# Patient Record
Sex: FEMALE | Race: WHITE | NOT HISPANIC OR LATINO | Employment: FULL TIME | URBAN - METROPOLITAN AREA
[De-identification: names, ages, dates, MRNs, and addresses within clinical notes are randomized per-mention and may not be internally consistent; named-entity substitution may affect disease eponyms.]

---

## 2022-07-21 ASSESSMENT — ENCOUNTER SYMPTOMS
HEADACHES: 0
ARTHRALGIAS: 0
CONSTIPATION: 0
COUGH: 0
NAUSEA: 0
MYALGIAS: 0
HEMATURIA: 0
SORE THROAT: 0
DYSURIA: 0
NERVOUS/ANXIOUS: 1
ABDOMINAL PAIN: 0
HEMATOCHEZIA: 0
WEAKNESS: 0
CHILLS: 0
EYE PAIN: 0
JOINT SWELLING: 0
HEARTBURN: 0
FEVER: 0
FREQUENCY: 0
PALPITATIONS: 0
BREAST MASS: 0
DIZZINESS: 0
DIARRHEA: 0
PARESTHESIAS: 0
SHORTNESS OF BREATH: 0

## 2022-07-22 ENCOUNTER — OFFICE VISIT (OUTPATIENT)
Dept: FAMILY MEDICINE | Facility: CLINIC | Age: 22
End: 2022-07-22
Payer: COMMERCIAL

## 2022-07-22 VITALS
BODY MASS INDEX: 40.55 KG/M2 | DIASTOLIC BLOOD PRESSURE: 80 MMHG | HEIGHT: 64 IN | OXYGEN SATURATION: 98 % | TEMPERATURE: 98.1 F | SYSTOLIC BLOOD PRESSURE: 124 MMHG | RESPIRATION RATE: 18 BRPM | HEART RATE: 93 BPM | WEIGHT: 237.5 LBS

## 2022-07-22 DIAGNOSIS — Z00.00 ENCOUNTER FOR ROUTINE HISTORY AND PHYSICAL EXAM IN FEMALE: Primary | ICD-10-CM

## 2022-07-22 DIAGNOSIS — E66.01 MORBID OBESITY (H): ICD-10-CM

## 2022-07-22 DIAGNOSIS — Z13.220 LIPID SCREENING: ICD-10-CM

## 2022-07-22 DIAGNOSIS — G47.9 SLEEP DISORDER: ICD-10-CM

## 2022-07-22 DIAGNOSIS — Z79.899 MEDICATION MANAGEMENT: ICD-10-CM

## 2022-07-22 DIAGNOSIS — J30.9 ALLERGIC RHINITIS, UNSPECIFIED SEASONALITY, UNSPECIFIED TRIGGER: ICD-10-CM

## 2022-07-22 LAB
ANION GAP SERPL CALCULATED.3IONS-SCNC: 9 MMOL/L (ref 7–15)
BUN SERPL-MCNC: 10.2 MG/DL (ref 6–20)
CALCIUM SERPL-MCNC: 9.7 MG/DL (ref 8.6–10)
CHLORIDE SERPL-SCNC: 103 MMOL/L (ref 98–107)
CHOLEST SERPL-MCNC: 157 MG/DL
CREAT SERPL-MCNC: 0.61 MG/DL (ref 0.51–0.95)
DEPRECATED HCO3 PLAS-SCNC: 27 MMOL/L (ref 22–29)
GFR SERPL CREATININE-BSD FRML MDRD: >90 ML/MIN/1.73M2
GLUCOSE SERPL-MCNC: 78 MG/DL (ref 70–99)
HDLC SERPL-MCNC: 56 MG/DL
HGB BLD-MCNC: 13.5 G/DL (ref 11.7–15.7)
LDLC SERPL CALC-MCNC: 84 MG/DL
NONHDLC SERPL-MCNC: 101 MG/DL
POTASSIUM SERPL-SCNC: 3.9 MMOL/L (ref 3.4–5.3)
SODIUM SERPL-SCNC: 139 MMOL/L (ref 136–145)
TRIGL SERPL-MCNC: 86 MG/DL

## 2022-07-22 PROCEDURE — 99385 PREV VISIT NEW AGE 18-39: CPT | Performed by: NURSE PRACTITIONER

## 2022-07-22 PROCEDURE — 36415 COLL VENOUS BLD VENIPUNCTURE: CPT | Performed by: NURSE PRACTITIONER

## 2022-07-22 PROCEDURE — 99213 OFFICE O/P EST LOW 20 MIN: CPT | Mod: 25 | Performed by: NURSE PRACTITIONER

## 2022-07-22 PROCEDURE — 85018 HEMOGLOBIN: CPT | Performed by: NURSE PRACTITIONER

## 2022-07-22 PROCEDURE — 80061 LIPID PANEL: CPT | Performed by: NURSE PRACTITIONER

## 2022-07-22 PROCEDURE — 80048 BASIC METABOLIC PNL TOTAL CA: CPT | Performed by: NURSE PRACTITIONER

## 2022-07-22 RX ORDER — VENLAFAXINE 37.5 MG/1
TABLET ORAL
COMMUNITY
Start: 2018-08-21 | End: 2022-07-22

## 2022-07-22 RX ORDER — VENLAFAXINE 37.5 MG/1
37.5 TABLET ORAL 2 TIMES DAILY
Qty: 180 TABLET | Refills: 3 | Status: SHIPPED | OUTPATIENT
Start: 2022-07-22 | End: 2024-02-21

## 2022-07-22 RX ORDER — LORATADINE 10 MG/1
TABLET ORAL
COMMUNITY
Start: 2022-04-01 | End: 2024-05-22

## 2022-07-22 RX ORDER — VENLAFAXINE 37.5 MG/1
37.5 TABLET ORAL 2 TIMES DAILY
COMMUNITY
Start: 2022-04-06 | End: 2022-07-22

## 2022-07-22 ASSESSMENT — ANXIETY QUESTIONNAIRES
2. NOT BEING ABLE TO STOP OR CONTROL WORRYING: SEVERAL DAYS
7. FEELING AFRAID AS IF SOMETHING AWFUL MIGHT HAPPEN: NOT AT ALL
IF YOU CHECKED OFF ANY PROBLEMS ON THIS QUESTIONNAIRE, HOW DIFFICULT HAVE THESE PROBLEMS MADE IT FOR YOU TO DO YOUR WORK, TAKE CARE OF THINGS AT HOME, OR GET ALONG WITH OTHER PEOPLE: NOT DIFFICULT AT ALL
1. FEELING NERVOUS, ANXIOUS, OR ON EDGE: SEVERAL DAYS
3. WORRYING TOO MUCH ABOUT DIFFERENT THINGS: SEVERAL DAYS
GAD7 TOTAL SCORE: 4
GAD7 TOTAL SCORE: 4
5. BEING SO RESTLESS THAT IT IS HARD TO SIT STILL: NOT AT ALL
6. BECOMING EASILY ANNOYED OR IRRITABLE: NOT AT ALL

## 2022-07-22 ASSESSMENT — PATIENT HEALTH QUESTIONNAIRE - PHQ9
5. POOR APPETITE OR OVEREATING: SEVERAL DAYS
SUM OF ALL RESPONSES TO PHQ QUESTIONS 1-9: 2

## 2022-07-22 ASSESSMENT — PAIN SCALES - GENERAL: PAINLEVEL: NO PAIN (0)

## 2022-07-22 NOTE — PROGRESS NOTES
Assessment and Plan:    Encounter for routine history and physical exam in female  Recommend consuming a healthy diet and exercising.  She declines HIV, hepatitis C screening, chlamydia, gonorrhea screening.  She believes she is up-to-date on vaccinations.  We will try to obtain records.  - Hemoglobin    Lipid screening  - Lipid panel reflex to direct LDL Fasting    Sleep disorder  Patient has suspected narcolepsy.  She is taking venlafaxine.  Will refer to sleep center.  - Adult Sleep Eval & Management  Referral  - venlafaxine (EFFEXOR) 37.5 MG tablet  Dispense: 180 tablet; Refill: 3    Morbid obesity (H)  Recommend consuming a healthy diet and exercising.  This may be contributing to his sleep disorder.    Allergic rhinitis, unspecified seasonality, unspecified trigger  She continues loratadine.  Allergy symptoms are controlled.    Medication management  - Basic metabolic panel  (Ca, Cl, CO2, Creat, Gluc, K, Na, BUN)      Subjective:     Indigo is a 22 year old female presenting to the clinic for a female physical.     LMP: one month ago, regular once/month   Hx of abnormal pap smear: none   Last pap smear: last year at [facility]   Perform self-breast exams: none   Vaginal discharge or irritation: none   Sexually active: yes,  to a male for 2 1/2 years  Contraception: none   Concerns for STDs: none   Previous pregnancies:none     Patient previously lived in [place].  At that time, she developed sleep paralysis and auditory hallucinations.  She saw neurology were multiple EEGs and sleep studies were performed.  Her provider suspected she had narcolepsy.  Patient is taking venlafaxine 37.5 mg twice daily.  She is interested in seeing a new sleep specialist.    Patient takes loratadine for allergy symptoms which are well controlled.    Review of systems:  I performed a 10 point review of systems.  All pertinent positives and negatives are noted in the HPI. All others are negative.     Allergies  "  Allergen Reactions     Seasonal Allergies Headache and Other (See Comments)       Current Outpatient Medications   Medication     loratadine (CLARITIN) 10 MG tablet     venlafaxine (EFFEXOR) 37.5 MG tablet     venlafaxine (EFFEXOR) 37.5 MG tablet     No current facility-administered medications for this visit.       Social History     Socioeconomic History     Marital status:      Spouse name: Not on file     Number of children: Not on file     Years of education: Not on file     Highest education level: Not on file   Occupational History     Not on file   Tobacco Use     Smoking status: Never Smoker     Smokeless tobacco: Never Used   Substance and Sexual Activity     Alcohol use: Not on file     Drug use: Not on file     Sexual activity: Not on file   Other Topics Concern     Not on file   Social History Narrative     Not on file     Social Determinants of Health     Financial Resource Strain: Not on file   Food Insecurity: Not on file   Transportation Needs: Not on file   Physical Activity: Not on file   Stress: Not on file   Social Connections: Not on file   Intimate Partner Violence: Not on file   Housing Stability: Not on file       No past medical history on file.    No family history on file.    No past surgical history on file.    Objective:     /80 (BP Location: Right arm, Patient Position: Sitting, Cuff Size: Adult Large)   Pulse 93   Temp 98.1  F (36.7  C)   Resp 18   Ht 1.613 m (5' 3.5\")   Wt 107.7 kg (237 lb 8 oz)   LMP 06/25/2022   SpO2 98%   BMI 41.41 kg/m      Patient is alert, no obvious distress.   Skin: Warm, dry.  No rashes or lesions. Skin turgor rapid return.   HEENT:  Eyes normal.  Ears normal.  Nose patent, mucosa pink.  Oropharynx mucosa pink, no lesions or tonsil enlargement.   Neck:  Supple, without lymphadenopathy, bruits, JVD. Thyroid normal texture and size.    Lungs:  Clear to auscultation.  No wheezing, rales noted.  Respirations even and unlabored.   Heart:  " Regular rate and rhythm.  No murmurs.   Breasts:  Normal.  No surrounding adenopathy.   Abdomen: Soft, nontender.  No organomegaly.  Bowel sounds normoactive.  No guarding or masses noted.   :  deferred  Musculoskeletal:  Full ROM of extremities.  Muscle strength equal +5/5.   Neurological:  Cranial nerves 2-12 intact.             Answers for HPI/ROS submitted by the patient on 7/21/2022  Frequency of exercise:: 2-3 days/week  Getting at least 3 servings of Calcium per day:: Yes  Diet:: Regular (no restrictions)  Taking medications regularly:: Yes  Medication side effects:: None  Bi-annual eye exam:: Yes  Dental care twice a year:: NO  Sleep apnea or symptoms of sleep apnea:: None  abdominal pain: No  Blood in stool: No  Blood in urine: No  chest pain: No  chills: No  congestion: Yes  constipation: No  cough: No  diarrhea: No  dizziness: No  ear pain: No  eye pain: No  nervous/anxious: Yes  fever: No  frequency: No  genital sores: No  headaches: No  hearing loss: No  heartburn: No  arthralgias: No  joint swelling: No  peripheral edema: No  mood changes: No  myalgias: No  nausea: No  dysuria: No  palpitations: No  Skin sensation changes: No  sore throat: No  urgency: No  rash: No  shortness of breath: No  visual disturbance: No  weakness: No  pelvic pain: No  vaginal bleeding: No  vaginal discharge: No  tenderness: No  breast mass: No  breast discharge: No  Additional concerns today:: No  Duration of exercise:: 15-30 minutes

## 2022-07-27 ENCOUNTER — TELEPHONE (OUTPATIENT)
Dept: FAMILY MEDICINE | Facility: CLINIC | Age: 22
End: 2022-07-27

## 2022-10-03 ENCOUNTER — HEALTH MAINTENANCE LETTER (OUTPATIENT)
Age: 22
End: 2022-10-03

## 2023-08-22 LAB
ABO/RH(D): NORMAL
ANTIBODY SCREEN: NEGATIVE
SPECIMEN EXPIRATION DATE: NORMAL
SPECIMEN EXPIRATION DATE: NORMAL

## 2023-08-23 ENCOUNTER — PRENATAL OFFICE VISIT (OUTPATIENT)
Dept: MIDWIFE SERVICES | Facility: CLINIC | Age: 23
End: 2023-08-23
Payer: COMMERCIAL

## 2023-08-23 VITALS
HEART RATE: 82 BPM | SYSTOLIC BLOOD PRESSURE: 128 MMHG | DIASTOLIC BLOOD PRESSURE: 78 MMHG | HEIGHT: 63 IN | WEIGHT: 240 LBS | BODY MASS INDEX: 42.52 KG/M2

## 2023-08-23 DIAGNOSIS — E66.01 MORBID OBESITY (H): ICD-10-CM

## 2023-08-23 DIAGNOSIS — O09.91 SUPERVISION OF HIGH RISK PREGNANCY, UNSPECIFIED, FIRST TRIMESTER: Primary | ICD-10-CM

## 2023-08-23 PROBLEM — G47.419 PRIMARY NARCOLEPSY WITHOUT CATAPLEXY: Status: ACTIVE | Noted: 2021-04-21

## 2023-08-23 LAB
BASOPHILS # BLD AUTO: 0.1 10E3/UL (ref 0–0.2)
BASOPHILS NFR BLD AUTO: 1 %
EOSINOPHIL # BLD AUTO: 0.5 10E3/UL (ref 0–0.7)
EOSINOPHIL NFR BLD AUTO: 4 %
ERYTHROCYTE [DISTWIDTH] IN BLOOD BY AUTOMATED COUNT: 12.8 % (ref 10–15)
HBA1C MFR BLD: 5.1 %
HCT VFR BLD AUTO: 42.6 % (ref 35–47)
HGB BLD-MCNC: 13.9 G/DL (ref 11.7–15.7)
IMM GRANULOCYTES # BLD: 0 10E3/UL
IMM GRANULOCYTES NFR BLD: 0 %
LYMPHOCYTES # BLD AUTO: 2.7 10E3/UL (ref 0.8–5.3)
LYMPHOCYTES NFR BLD AUTO: 23 %
MCH RBC QN AUTO: 28.2 PG (ref 26.5–33)
MCHC RBC AUTO-ENTMCNC: 32.6 G/DL (ref 31.5–36.5)
MCV RBC AUTO: 86 FL (ref 78–100)
MONOCYTES # BLD AUTO: 1 10E3/UL (ref 0–1.3)
MONOCYTES NFR BLD AUTO: 9 %
NEUTROPHILS # BLD AUTO: 7.5 10E3/UL (ref 1.6–8.3)
NEUTROPHILS NFR BLD AUTO: 63 %
NRBC # BLD AUTO: 0 10E3/UL
NRBC BLD AUTO-RTO: 0 /100
PLATELET # BLD AUTO: 294 10E3/UL (ref 150–450)
RBC # BLD AUTO: 4.93 10E6/UL (ref 3.8–5.2)
T PALLIDUM AB SER QL: NONREACTIVE
TSH SERPL DL<=0.005 MIU/L-ACNC: 2.75 UIU/ML (ref 0.3–4.2)
WBC # BLD AUTO: 11.9 10E3/UL (ref 4–11)

## 2023-08-23 PROCEDURE — 85025 COMPLETE CBC W/AUTO DIFF WBC: CPT | Performed by: MIDWIFE

## 2023-08-23 PROCEDURE — 87389 HIV-1 AG W/HIV-1&-2 AB AG IA: CPT | Performed by: MIDWIFE

## 2023-08-23 PROCEDURE — 86900 BLOOD TYPING SEROLOGIC ABO: CPT | Performed by: MIDWIFE

## 2023-08-23 PROCEDURE — 36415 COLL VENOUS BLD VENIPUNCTURE: CPT | Performed by: MIDWIFE

## 2023-08-23 PROCEDURE — 86803 HEPATITIS C AB TEST: CPT | Performed by: MIDWIFE

## 2023-08-23 PROCEDURE — 86780 TREPONEMA PALLIDUM: CPT | Performed by: MIDWIFE

## 2023-08-23 PROCEDURE — 87086 URINE CULTURE/COLONY COUNT: CPT | Performed by: MIDWIFE

## 2023-08-23 PROCEDURE — 86850 RBC ANTIBODY SCREEN: CPT | Performed by: MIDWIFE

## 2023-08-23 PROCEDURE — 84443 ASSAY THYROID STIM HORMONE: CPT | Performed by: MIDWIFE

## 2023-08-23 PROCEDURE — 86762 RUBELLA ANTIBODY: CPT | Performed by: MIDWIFE

## 2023-08-23 PROCEDURE — 87340 HEPATITIS B SURFACE AG IA: CPT | Performed by: MIDWIFE

## 2023-08-23 PROCEDURE — 86901 BLOOD TYPING SEROLOGIC RH(D): CPT | Performed by: MIDWIFE

## 2023-08-23 PROCEDURE — 99204 OFFICE O/P NEW MOD 45 MIN: CPT | Performed by: MIDWIFE

## 2023-08-23 PROCEDURE — 83036 HEMOGLOBIN GLYCOSYLATED A1C: CPT | Performed by: MIDWIFE

## 2023-08-23 ASSESSMENT — EDINBURGH POSTNATAL DEPRESSION SCALE (EPDS)
I HAVE FELT SCARED OR PANICKY FOR NO GOOD REASON: NO, NOT MUCH
I HAVE LOOKED FORWARD WITH ENJOYMENT TO THINGS: RATHER LESS THAN I USED TO
TOTAL SCORE: 10
I HAVE BLAMED MYSELF UNNECESSARILY WHEN THINGS WENT WRONG: NOT VERY OFTEN
I HAVE FELT SAD OR MISERABLE: NOT VERY OFTEN
I HAVE BEEN ANXIOUS OR WORRIED FOR NO GOOD REASON: YES, SOMETIMES
I HAVE BEEN ABLE TO LAUGH AND SEE THE FUNNY SIDE OF THINGS: NOT QUITE SO MUCH NOW
I HAVE BEEN SO UNHAPPY THAT I HAVE BEEN CRYING: ONLY OCCASIONALLY
THINGS HAVE BEEN GETTING ON TOP OF ME: NO, MOST OF THE TIME I HAVE COPED QUITE WELL
I HAVE BEEN SO UNHAPPY THAT I HAVE HAD DIFFICULTY SLEEPING: NOT VERY OFTEN
THE THOUGHT OF HARMING MYSELF HAS OCCURRED TO ME: NEVER

## 2023-08-23 NOTE — PATIENT INSTRUCTIONS
"recommendations for care for those with BMI ?40:  An early 1st trimester ultrasound  Nutrition consult with our clinic staff  A Level II US at mid pregnancy  Ultrasounds to monitor the baby's growth at the end of pregnancy, every 4-6 weeks  Monitoring during pregnancy including biophysical profile ultrasound and nonstress test heart rate monitoring weekly beginning at 36 weeks  An earlier delivery in the 39th week of pregnancy   Internal monitors during labor and delivery to better assess contractions and fetal heart rate if needed  A consultation with our anesthesia team if choosing an epidural  Uterotonics (medication to decrease risk of severe hemorrhage) at the time of delivery  Preventative medication for blood clots after birth  Screening for diabetes after pregnancy  \"We hope you had a positive experience and that you can definitely recommend [facility] to your family and friends. You ll be receiving a survey soon and we look forward to hearing your feedback\".    Welcome to [facility]   and thank you for choosing us for your maternity care provider!  Congratulations!      [employer]  After each of your visits you are welcome to check [employer] for your visit summary including education and links to information relevant to your pregnancy and/or well woman care.   Find the \"Visits\" tab at the top of the page, you will see a list of recent visits and for each visit a for link for \"View After Visit Summary.\" View of your After Visit Summary will allow you to read our recommendations from your visit, review any education provided, and link to websites with useful information.   If you have any questions or difficulty navigating [employer], please feel free to contact us and we will do our best to direct you.  Meet the Midwives from [facility]  You are invited to an informational meet and greet with [facility] Certified Nurse-Midwives. " "Our free \"Meet the Midwives\" event is a great opportunity to learn about our midwives' philosophy and experience, the hospitals where we can assist with your birth, and answer questions you may have. Partners, friends, and family are welcome to attend. Currently, this is a virtual event.  Date  Last Thursday of every month at 7 pm.    Link to next (live) meeting   [email or web address].[email or web address]/meet-the-[email or web address]  To Join by Telephone (audio only) Call:   [phone] Phone Conference ID: [phone] #    Contact information:  Appointment line and to get a hold of CNM in clinic Monday-Friday 8 am - 5 pm:  [phone].  There are some clinics with early start times (1st appointment 7:40 am) and others with evening hours (last appointment 6:20 pm).  Most are typically open from 8 am to 5 pm.    CNM on call answering service: [phone].  Specify your hospital of choice and leave a brief message for CNM;  will then page CNM who is on call at your specified hospital and you should receive a call back with 15 minutes.  Be sure that your ringer is audible and that you can accept blocked calls so that we can get back in touch with you! This number should be reserved for urgent needs if during the day, before 8 am, after 5 pm, weekends, holidays.      We support all families in their infant feeding journey. We'll provide education on Breastfeeding/Chestfeeding early and often to help you achieve your goals. Please let us know if you have questions along the way!      Breastfeeding: a Healthy Option for You and Your Baby  Consider breastfeeding for the healthiest way to feed your baby. Ask your midwife or physician for more information.     The choice of how you will feed your baby is important.  Before your baby s birth, you ll want to learn about the benefits of breastfeeding.  [facility] continue to support Baby Friendly standards; " an initiative that was created by the World Health Organization and UNICEF.  This helps give you and your baby the best start in feeding their baby.    Why should I breastfeed my baby?   Babies are less likely to develop childhood obesity or diabetes   Babies are less likely to suffer from recurrent ear infections   Babies are less likely to be hospitalized for respiratory conditions   Breast milk is rich in nutrients and antibodies-it is easy to digest    How does it benefit me?   Lowers the risk for diabetes, breast and ovarian cancer and postpartum depression   Moms can lose  baby weight  more quickly   Cost savings - formula can cost well over $1,500 per year   Convenient - no bottles and nipples to sterilize, no measuring and mixing formula   The physical contact with breastfeeding can make babies feel secure, warm and comforted     What about formula?  While you and your baby are staying with us at [facility], we will support whatever feeding choice you make for your baby.    Some important considerations:    The American Academy of Pediatrics, the World Health Organization, and many more organizations recommend exclusive breastfeeding for 6 months and continued breastfeeding while adding other foods for the first 1-2 years.    Any amount of breastmilk has benefits to both baby and mother.  Giving formula in replacement of breastfeeding can affect mother s milk supply.  If formula is needed, hospital staff will work with you on a plan to help develop your milk supply.  Formula alters the natural growth of good bacteria in the  stomach.   Research has found that first time mothers who offer formula in the hospital have a shorter duration of breastfeeding.    How can I start to prepare?   Start by having a conversation with your medical provider.   Talk with your partner, family and friends.   Attend a prenatal class that includes breastfeeding preparation. Birth and breastfeeding classes are  offered by [facility]. Visit [employer] for class information.   After your baby s birth, hospital staff and lactation consultants will help you and your baby get off to a great start with breastfeeding.      RESOURCES  [facility] Maternity Care:   https://[facility].org/locations/the-birthplace-at[facility] Maternity Care:   https://[facility].VSHORE/locations/thebirthplace-at[facility]    Scroll to the bottom of this page if the above link does not work      Breastfeeding:    OUTPATIENT LACTATION RESOURCES     -Schedule an appointment with a [facility] CNBRETT who is also a Lactation Consultant by calling [phone]. We see women for breastfeeding visits at [facility] and [facility].     Chocolate Milk Club:  http://www.iDentiMob.[employer]/chocolate-milk-club/  Join the Facebook group or join us for support on the first Monday of each month from 7 to 9 p.m.  , Dr. Anita Zuñiga, DNP, CNM, CNP, IBCLC, ICEA  Phone: [phone]  Fax: [phone]  Email: [email or web address]    R.O.S.E. = Reaching our Sisters Everywhere  Http://www.breastfeedingrose.org/    Black Women Do Breastfeed Blog  www.blackwomendobreastfeed.org    Club Mom breastfeeding support for Black mothers:  Contact Aime Gonzalez  Phone: [phone]   Email:  [email or web address].     Teresa Gamboa  Phone: [phone]   Email:  [email or web address].    Club Dad parent support for Black fathers:   Contact Braeden Osborne   Phone: [phone]   Email:  [email or web address].    The ong Breastfeeding Coalition is a wonderful support for [facility] women who are breastfeeding.  They are best found on Facebook.      The Hmong Breastfeeding Coalition has produced a collection of video stories about breastfeeding in  the Hmong community, produced by the ong Breastfeeding Coalition.  Most are in English, but one on handling breastmilk is in Hmong.  The video collection is in the middle of the page.    This page also has several other resources on Hmong breastfeeding.     https://mnbreastfeedingcoalition.org/communities/    WIC program application: [facility]   Https://www.youCinedigmube.com/watch?v=lQoWwPoyGYc    For information on Local WIC services call:  [phone]    You may qualify...  If you are pregnant, nursing, or have a child under age 5, we encourage you to Apply for WIC.    WIC Provides...  Nutrition tips and advice  Support for breastfeeding  Healthy foods like fresh fruits and vegetables, whole grain cereals, bread and tortillas, low fat milk, and baby foods  Caring and supportive staff  Don't delay! We're here to help!  CALL TODAY FOR A WIC CLINIC NEAR YOU  4-208-AQC-8233 or 1-558.343.8584     New Parent Connection:   Judy Zendejas, [address] [place] [address]  In-person meetings on  from 6 pm - 7:15 pm for parents of  to 9 months, at the same site.   All are free, drop-in, no registration required.    There are also free virtual meetings ongoing on :  11:30 am - 12:30 pm for parents of newborns to 3 months  4:15 pm to 5:15 pm for parents of 3 to 9-month olds  For joining info parents should call Maria G Flores at [phone]    -Baby Café  Find a Baby Café - Baby Café USA (babycafeusa.org)   Search by State ([place]) to find the nearest cafe to you      [facility] Baby Café  Due to COVID-19, all Baby Café sessions are canceled until further notice. For lactation support, please contact one of our bilingual staff:  Cora (IBCLC) [phone]  Amara (IBCLC/ Citizen of the [place]) [phone]  Olga (Hmong) [phone]  Herber (North Korean) [phone]  Baby Café is a free, drop-in service offering breastfeeding/chestfeeding support. Come share tips and socialize with other pregnant,  breastfeeding/chestfeeding families. Babies and siblings are welcome (no  available).  We offer:  Professionally trained lactation staff.  Resource books for lending.  Relaxed and fun atmosphere.  Refreshments.   More information  Amaraporsche Saldana  [phone]  [email or web address]     -Attend a baby weigh in at [facility].  Lactation consultants are available to answer questions  [facility]: Tuesdays 1:00 - 2:00  [facility]: Mondays 1:00 - 2:00   www.[employer]    -Attend one of the New Mama groups at [facility] in [facility].  [facility] also offers one-on-one in home and in office lactation consults.   www.[employer]    -Attend a LeLeche League meeting.  Multiple groups in several locations throughout the [facility]. The meetings are no-cost and always informative breastfeeding education session through [employer] La Leche League  Www.lllondas.org/     Held at [facility] the second Thursday of each month at 7pm    Childbirth and Parenting Education:     Everyday Miracles:   https://www.everyday-miracles.org/    Free Video Series from [facility]: https://nursing.[facility]/academics/specialty-areas/nurse-midwifery/having-baby-prenatal-videos/having-baby-prenatal-and    Childbirth Education virtual (live) classes: www."[employer]"/classes  The Birth Hour: https://[employer]/online-childbirth-class/  BirthED: https://www.birthedmn.com/  [place] parenting center: http://[place]/   (455) 284-BABY  [place]: (education, yoga & wellness) www.[employer].[place]  [facility]: www.Job36maPercSys   Childbirth collective: (Parent topic nights)  www.childbirthcollective.org/  Hypnobabies:  www.hypnobabiestwincities.com/  Hypnobirthing:  Http://hypnobirtJust Above Cost.com/  Hypnobirthing virtual class: www.flutterbybirth.com/hypnobirthing    Information about doulas:  Childbirth collective:  http://www.childbirthcollective.org/  Doulas of [place] (LORNA):  www.lorna.org  [facility]  project: http://twincitiesdoulaproject.com/     Early Childhood and Family Education (ECFE):  ECFE offers parents hands-on learning experiences that will nourish a lifetime of teachable moments.  http://ecfe.info/ecfe-home/    APPS and Podcasts:   Andreia Cesar Nurture    Evidence Based Birth  The Birth Hour (for birth stories)   Birthful   Expectful   The Longest Shortest Time  PregnancyPodcast Mikayla Santana    Book Recommendations:   Batsheva Bl[place]omsburg's Birthing From Within--first few chapters include a new-age tone, you may prefer to skip it and keep going, because there is good stuff later.  This book recommendation covers emotional preparation, but does cover coping with pain, and use of both pharmacological and nonpharmacological methods.    Guide to Childbirth by Alysa Rojas  Childbirth Without Fear by Mateo Banegas Read    Dr. [facility]' The Pregnancy Book and The Birth Book--the pregnancy book goes month-by month    The Birth Partner by Tressa High    Womanly Art of Breastfeeding by [employer]   Bestfeeding by Rena Loja--great pictures    Mothering Your Nursing Toddler, by Susannah Blanco.   Addresses dealing with so many of the challenging behaviors of a nursing toddler.  How Weaning Happens, by La Leche League.  Discusses weaning at all ages, from medically necessary weaning of an infant, all the way up to age 5 (or older), with why/why not, and strategies.  Very empowering book both for deciding to wean and deciding not to.    American College of Nurse-Midwives (ACNM) http://www.midwife.org/; look at the informational handouts at http://www.midwife.org/Share-With-Women     www.mymidwife.org    Mother to Baby (Medication and Herbal guidance in pregnancy): http://www.mothertobaby.org  Toll-Free Hotline: 874.959.5372  LactMed (Medication use while breastfeeding):  "http://toxnet.nlm.nih.gov/newtoxnet/lactmed.htm    Women's Health.gov:  http://www.womenshealth.gov/a-z-topics/index.html    American pregnancy association - http://americanpregnancy.org    Centering Pregnancy (group prenatal care option): http://centeringhealthcare.org    March of Dimes www."[employer].mnlakeplace.com     FDA - Nutrition  www.mypyramid.gov  Under \"For Consumers,\" click on \"pregnant and breastfeeding women.\"      Centers for Disease Control and Prevention (CDC) - Vaccines : http://www.cdc.gov/vaccines/       When researching information on the web, question the validity of websites.  The domains .gov, .edu and.org tend to be more reliable information.  If there are a lot of advertisements, be cautious of the information provided. Stay away from blogs and chat rooms please!   "

## 2023-08-23 NOTE — PROGRESS NOTES
"PRENATAL VISIT   FIRST OBSTETRICAL EXAM - OB     Assessment / Impression   First prenatal visit at Unknown  23 year old    Last pap = 2021  Pre-pregnant BMI 43  EDPS = 7, \"0\" to #10    Plan:   -early dating ultrasound  -referral to nutrition for dietary counseling  -IOB labs drawn.   -Pt is not a candidate for drawing lead level per [facility] screening tool.   -Patient is not a candidate for waterbirth.    -Reviewed prenatal care schedule. Discussed recommendations in pregnancy for BMI> 40, fetal surveillance,etc. Added to problem list.  -Optimal nutrition and weight gain discussed. Pregnancy weight gain of no weight gain (BMI 40 or greater) encouraged.   -Anticipatory guidance for common pregnancy questions and concerns reviewed.   -Danger s/sx for this trimester reviewed with patient.   -Reviewed genetic screening options with patient, patient  will wait until after ultrasound to decide  elect for first trimester screening. The patient  will wait to decide  elect for quad screening.   -Reviewed carrier testing options with patient, patient does not elect for testing or referral to genetic counseling.  -IOB packet given and reviewed with patient.   -[facility] services and hospital options reviewed; emergency and scheduling phone numbers given to patient.   -Because the patient does have 1 high risk factor, low-dose aspirin will be initiated at 12 weeks.  -Antepartum VTE risk factors absent.  -Pt has 1 or more risk factors for overt diabetes and is less than 12 weeks gestation, so Hgb A1C added to labs today.  -Pt is not a candidate for an antepartum OB consult.    -Return to clinic in 4 weeks or sooner as needed.    Total time: 45 minutes spent on the date of the encounter doing chart review, review of test results, patient visit and documentation.     Subjective:   Idnigo Bright is a 23 year old  here today for her first obstetrical exam at Unknown. Here with Austyn . This pregnancy is unplanned. The " patient reports nausea and breast tenderness. She has a certain LMP Patient's last menstrual period was 2023 (exact date)., predicting an expected date of delivery of Estimated Date of Delivery: Data Unavailable. Last period was normal. Her previous three cycles were 30-55 days. Her pregnancy is dated by LMP. History of narcolepsy was on Venelafaxine 37.5 mg daily, stopped few weeks ago and doing OK for now.     The patient states that she is in a monogamous relationship and states that she is safe. Offered GC/CT screening today and patient declines. Additional pregnancy symptoms include: breast tenderness, nausea, and positive home pregnancy test.     Risk factors: pre-pregnancy obesity. Pregnancy Risk Factors:Significantly overweight or underweight    The patient has the following high risk factors for preeclampsia: none.   The patient has the following moderate risk factors for preeclampsia: first pregnancy, BMI greater than 30, and family history of preeclampsia (mother or sister)    The patient has the following antepartum risk factors for VTE (two or more risk factors, or 1 * risk factor, places patient at higher risk): *BMI greater or equal to 40, current.    Clinical history/risk factors requiring antepartum OB consult: none.    The patient has the following risk factors for diabetes: BMI greater than or equal to 40.    Social History:   Education level: college graduate   Occupation: sales interior design   Partners name: Austyn   ?   OB History    Para Term  AB Living   1 0 0 0 0 0   SAB IAB Ectopic Multiple Live Births   0 0 0 0 0      # Outcome Date GA Lbr Delvis/2nd Weight Sex Delivery Anes PTL Lv   1 Current                 History:   Past Medical History:   Diagnosis Date    Hypertension 08/15/21      Past Surgical History:   Procedure Laterality Date    WISDOM TOOTH EXTRACTION        Family History   Problem Relation Age of Onset    Hypertension Mother     Asthma Mother     Obesity  "Mother     Hypertension Father         High blood pressure is related to heavy weight    Obesity Father     Other Cancer Maternal Grandfather          of smoking related lung cancer    Hypertension Maternal Grandmother     Obesity Maternal Grandmother     Hypertension Paternal Grandmother     Cerebrovascular Disease Paternal Grandmother     Other Cancer Paternal Grandmother          of thyroid cancer    Thyroid Disease Paternal Grandmother     Depression Sister       Social History     Tobacco Use    Smoking status: Never    Smokeless tobacco: Never   Substance Use Topics    Alcohol use: Not Currently     Comment: Occasional drink with dinner but not more than once a month.    Drug use: Never      Current Outpatient Medications   Medication Sig Dispense Refill    loratadine (CLARITIN) 10 MG tablet       venlafaxine (EFFEXOR) 37.5 MG tablet Take 1 tablet (37.5 mg) by mouth 2 times daily (Patient not taking: Reported on 2023) 180 tablet 3      Allergies   Allergen Reactions    Seasonal Allergies Headache and Other (See Comments)        The patient's medical, surgical and family histories were reviewed and were pertinent to this visit.     Pap smear: Last Pap: 2021, Result: normal.    EPDS score today: 7.\"0\" to #10   History of anxiety or depression: no    Review of Systems   General: Fatigue but otherwise denies problem   Eyes: Denies problem   Ears/Nose/Throat: Denies problem   Cardiovascular: Denies problem   Respiratory: Denies problem   Gastrointestinal: Nausea without vomiting, otherwise negative   Genitourinary: Denies any discharge, vaginal bleeding or itchiness or any other problem   Musculoskeletal: Breast tenderness otherwise denies problem   Skin: Denies problem   Neurologic: Denies problem   Psychiatric: Denies problem   Endocrine: Denies problem   Heme/Lymphatic: Denies problem   Allergic/Immunologic: Denies problem         Objective:   Objective    Vitals:    23 1423   BP: 128/78 " "  Pulse: 82   Weight: 108.9 kg (240 lb)   Height: 1.588 m (5' 2.5\")        Physical Exam:   General Appearance: Alert, cooperative, no distress, appears stated age   HEENT: Normocephalic, without obvious abnormality, atraumatic. Conjunctiva/corneas clear, does wear corrective lenses   Neck: Supple, symmetrical, trachea midline, no adenopathy.   Thyroid: not enlarged, symmetric, no tenderness/mass/nodules   Back: Symmetric, no curvature, ROM normal, no CVA tenderness   Lungs: Clear to auscultation bilaterally, respirations unlabored   Heart: Regular rate and rhythm, S1 and S2 normal, no murmur, rub, or gallop. No edema to lower extremities.   Breasts: No breast masses, tenderness, asymmetry, or nipple discharge. Nipples are everted.   Abdomen: Gravid, soft, non-tender, bowel sounds active all four quadrants, no masses.   FHT: not heart   Pelvic exam deferred  Musculoskeletal: Extremities normal, atraumatic, no cyanosis   Skin: Skin color, texture, turgor normal, no rashes or lesions   Lymph nodes: Cervical, supraclavicular, and axillary nodes normal   Neurologic: Alert and oriented x 3. Normal speech              "

## 2023-08-24 LAB
HBV SURFACE AG SERPL QL IA: NONREACTIVE
HCV AB SERPL QL IA: NONREACTIVE
HIV 1+2 AB+HIV1 P24 AG SERPL QL IA: NONREACTIVE
RUBV IGG SERPL QL IA: 1.98 INDEX
RUBV IGG SERPL QL IA: POSITIVE

## 2023-08-25 LAB — BACTERIA UR CULT: NORMAL

## 2023-08-29 ENCOUNTER — HOSPITAL ENCOUNTER (OUTPATIENT)
Dept: ULTRASOUND IMAGING | Facility: CLINIC | Age: 23
Discharge: HOME | End: 2023-08-29
Attending: MIDWIFE | Admitting: MIDWIFE
Payer: COMMERCIAL

## 2023-08-29 DIAGNOSIS — O09.91 SUPERVISION OF HIGH RISK PREGNANCY, UNSPECIFIED, FIRST TRIMESTER: ICD-10-CM

## 2023-08-29 PROCEDURE — 76801 OB US < 14 WKS SINGLE FETUS: CPT

## 2023-09-14 ENCOUNTER — MYC MEDICAL ADVICE (OUTPATIENT)
Dept: FAMILY MEDICINE | Facility: CLINIC | Age: 23
End: 2023-09-14
Payer: COMMERCIAL

## 2023-09-20 ENCOUNTER — PRENATAL OFFICE VISIT (OUTPATIENT)
Dept: MIDWIFE SERVICES | Facility: CLINIC | Age: 23
End: 2023-09-20
Payer: COMMERCIAL

## 2023-09-20 VITALS
BODY MASS INDEX: 41.94 KG/M2 | HEART RATE: 86 BPM | WEIGHT: 233 LBS | DIASTOLIC BLOOD PRESSURE: 76 MMHG | SYSTOLIC BLOOD PRESSURE: 126 MMHG

## 2023-09-20 DIAGNOSIS — O09.91 SUPERVISION OF HIGH RISK PREGNANCY, UNSPECIFIED, FIRST TRIMESTER: Primary | ICD-10-CM

## 2023-09-20 PROCEDURE — 99207 PR PRENATAL VISIT: CPT | Performed by: MIDWIFE

## 2023-09-20 NOTE — PROGRESS NOTES
Indigo is here today with Austyn for a routine prenatal visit at 10w5d. Concerns today include continued N&V and loose stools. Discussed comfort measures and OTC meds to try. Considering travel in late December briefly discussed travel precautions. Continues to take prenatal vitamin daily. Declines early prenatal screening testing. Happy to hear FHTs today by Prema. Has number for on call midwife to call prn. RTC 4 wk.

## 2023-09-20 NOTE — PATIENT INSTRUCTIONS
"\"We hope you had a positive experience and that you can definitely recommend [facility] to your family and friends. You ll be receiving a survey soon and we look forward to hearing your feedback\".    Welcome to [facility]   and thank you for choosing us for your maternity care provider!  Congratulations!      T[employer]hart  After each of your visits you are welcome to check Mzinga for your visit summary including education and links to information relevant to your pregnancy and/or well woman care.   Find the \"Visits\" tab at the top of the page, you will see a list of recent visits and for each visit a for link for \"View After Visit Summary.\" View of your After Visit Summary will allow you to read our recommendations from your visit, review any education provided, and link to websites with useful information.   If you have any questions or difficulty navigating [employer], please feel free to contact us and we will do our best to direct you.  Meet the Midwives from [employer]  You are invited to an informational meet and greet with [facility] Certified Nurse-Midwives. Our free \"Meet the Midwives\" event is a great opportunity to learn about our midwives' philosophy and experience, the hospitals where we can assist with your birth, and answer questions you may have. Partners, friends, and family are welcome to attend. Currently, this is a virtual event.  Date  Last Thursday of every month at 7 pm.    Link to next (live) meeting   [email or web address]  To Join by Telephone (audio only) Call:   [phone] Phone Conference ID: [phone] #    Contact information:  Appointment line and to get a hold of CNM in clinic Monday-Friday 8 am - 5 pm:  [phone].  There are some clinics with early start times (1st appointment 7:40 am) and others with evening hours (last appointment 6:20 pm).  Most are typically open from 8 " am to 5 pm.    CNM on call answering service: [phone].  Specify your hospital of choice and leave a brief message for CNM;  will then page CNM who is on call at your specified hospital and you should receive a call back with 15 minutes.  Be sure that your ringer is audible and that you can accept blocked calls so that we can get back in touch with you! This number should be reserved for urgent needs if during the day, before 8 am, after 5 pm, weekends, holidays.      We support all families in their infant feeding journey. We'll provide education on Breastfeeding/Chestfeeding early and often to help you achieve your goals. Please let us know if you have questions along the way!      Breastfeeding: a Healthy Option for You and Your Baby  Consider breastfeeding for the healthiest way to feed your baby. Ask your midwife or physician for more information.     The choice of how you will feed your baby is important.  Before your baby s birth, you ll want to learn about the benefits of breastfeeding.  [facility] ([place] and [facility]) continue to support Baby Friendly standards; an initiative that was created by the World Health Organization and UNICEF.  This helps give you and your baby the best start in feeding their baby.    Why should I breastfeed my baby?   Babies are less likely to develop childhood obesity or diabetes   Babies are less likely to suffer from recurrent ear infections   Babies are less likely to be hospitalized for respiratory conditions   Breast milk is rich in nutrients and antibodies-it is easy to digest    How does it benefit me?   Lowers the risk for diabetes, breast and ovarian cancer and postpartum depression   Moms can lose  baby weight  more quickly   Cost savings - formula can cost well over $1,500 per year   Convenient - no bottles and nipples to sterilize, no measuring and mixing formula   The physical contact with  breastfeeding can make babies feel secure, warm and comforted     What about formula?  While you and your baby are staying with us at [facility], we will support whatever feeding choice you make for your baby.    Some important considerations:    The American Academy of Pediatrics, the World Health Organization, and many more organizations recommend exclusive breastfeeding for 6 months and continued breastfeeding while adding other foods for the first 1-2 years.    Any amount of breastmilk has benefits to both baby and mother.  Giving formula in replacement of breastfeeding can affect mother s milk supply.  If formula is needed, hospital staff will work with you on a plan to help develop your milk supply.  Formula alters the natural growth of good bacteria in the  stomach.   Research has found that first time mothers who offer formula in the hospital have a shorter duration of breastfeeding.    How can I start to prepare?   Start by having a conversation with your medical provider.   Talk with your partner, family and friends.   Attend a prenatal class that includes breastfeeding preparation. Birth and breastfeeding classes are offered by [employer]. Visit [employer] for class information.   After your baby s birth, hospital staff and lactation consultants will help you and your baby get off to a great start with breastfeeding.      RESOURCES  [facility] Maternity Care:   https://[facility].org/locations/the-birthplace-at[facility] M[facility] Care:   https://[facility].org/locations/the-birthplace-[facility]    Scroll to the bottom of this page if the above link does not work      Breastfeeding:    OUTPATIENT LACTATION RESOURCES     -Schedule an appointment with a [facility] W[facility]ACE who is also a Lactation Consultant by calling [phone]. We see women for  breastfeeding visits at [facility] and [facility].     Chocolate Milk Club:  http://www.CardeeovesselsmidwiferRudy[employer]ater[employer].com/chocolate-milk-club/  Join the Facebook group or join us for support on the first Monday of each month from 7 to 9 p.m.  , Dr. Anita Zuñiga, DNP, CNM, CNP, IBCLC, ICEA  Phone: [phone]  Fax: [phone]  Email: [email or web address]    R.O.S.E. = Reaching our Sisters Everywhere  Http://www.breastfeedingrose.org/    Black Women Do Breastfeed Blog  www.blackwomendobreastfeed.org    Club Mom breastfeeding support for Black mothers:  Contact Aime Gonzalez  Phone: [phone]   Email:  [email or web address].     Teresa Gamboa  Phone: [phone]   Email:  [email or web address]    Club Dad parent support for Black fathers:   Contact Braeden Osborne   Phone: [phone]   Email:  [email or web address]    The ong Breastfeeding Coalition is a wonderful support for [facility] women who are breastfeeding.  They are best found on Facebook.      The Hmong Breastfeeding Coalition has produced a collection of video stories about breastfeeding in the [facility] community, produced by the Hmong Breastfeeding Coalition.  Most are in English, but one on handling breastmilk is in ong.  The video collection is in the middle of the page.    This page also has several other resources on ong breastfeeding.     https://mnbreastfeedingcoalition.org/communities/    WIC program application: Jordin Vazquez   Https://www.youtube.com/watch?v=lQoWwPoyGYc    For information on Local WIC services call:  [phone]    You may qualify...  If you are pregnant, nursing, or have a child under age 5, we encourage you to Apply for WIC.    WIC Provides...  Nutrition tips and advice  Support for breastfeeding  Healthy foods like fresh fruits and vegetables, whole grain cereals, bread and tortillas, low fat milk, and baby foods  Caring and  supportive staff  Don't delay! We're here to help!  CALL TODAY FOR A WIC CLINIC NEAR YOU  2-710-GUF-4331 or [phone]     New Parent Connection:   Judy Zendejas, [address] [facility]  In-person meetings on  from 6 pm - 7:15 pm for parents of  to 9 months, at the same site.   All are free, drop-in, no registration required.    There are also free virtual meetings ongoing on :  11:30 am - 12:30 pm for parents of newborns to 3 months  4:15 pm to 5:15 pm for parents of 3 to 9-month olds  For joining info parents should call Maria G Flores at [phone]    -Baby Café  Find a Baby Café - Baby Café OpenPeak (babycafeusa.org)   Search by State ([place]) to find the nearest cafe to you      [facility] Baby Café  Due to COVID-19, all Baby Café sessions are canceled until further notice. For lactation support, please contact one of our bilingual staff:  Cora (IBCLC) [phone]  Amara (IBCLC/ Citizen of [place]) [phone]  Zoua (Hmong) [phone]  Herber (Polish) [phone]  Baby Café is a free, drop-in service offering breastfeeding/chestfeeding support. Come share tips and socialize with other pregnant, breastfeeding/chestfeeding families. Babies and siblings are welcome (no  available).  We offer:  Professionally trained lactation staff.  Resource books for lending.  Relaxed and fun atmosphere.  Refreshments.   More information  Amara Saldana  [phone]  [email or web address]     -Attend a baby weigh in at [facility].  Lactation consultants are available to answer questions  [place]:  1:00 - 2:00  [facility]:  1:00 - 2:00   www.[facility] Lans[facility] HospitalYourPlaceer.[employer]Vision    -Attend one of the New Mama groups at [facility] in [facility].  [facility] also offers one-on-one in home and in office lactation consults.   www.[facility].com    -Attend a LeLeche League meeting.  Multiple groups in several locations throughout the [facility]. The  meetings are no-cost and always informative breastfeeding education session through [employer] La Leche League  Www.ion.org/     Held at [facility] the second Thursday of each month at 7pm    Childbirth and Parenting Education:     Everyday Miracles:   https://www.everyday-miracles.org/    Free Video Series from [facility]: https://nursing.[facility]/academics/specialty-areas/nurse-midwifery/having-baby-prenatal-videos/having-baby-prenatal-and    Childbirth Education virtual (live) classes: www.[employer]/classes  The Birth Hour: https://[employer]/online-childbirth-class/  BirthED: https://www.birthedmn.com/  KARL parenting center: http://amEaton Rapids Medical CenterAcusphere/   (658) 838-CRNK  Blooma: (education, yoga & wellness) www.Malwarebytes  Enlightened Mama: www.NOMERMAIL.RUenedmama.[employer]   Childbirth collective: (Parent topic nights)  www.childbirthcollective.org/  Hypnobabies:  www.hypnobabiestMoneyspyder.[employer] Solutions/  Hypnobirthing:  Http://Slated/  Hypnobirthing virtual class: www.[employer]/hypnobirthing    Information about doulas:  Childbirth collective: http://www.childbirthcollective.org/  Doulas of [place] (LORNA):  www.lorna.org  [facility]  project: http://twincitiesdoulaproject.com/     Early Childhood and Family Education (ECFE):  ECFE offers parents hands-on learning experiences that will nourish a lifetime of teachable moments.  http://ecfe.info/ecfe-home/    APPS and Podcasts:   Andreia Cesar Nurture    Evidence Based Birth  The Birth Hour (for birth stories)   Birthful   Expectful   The Longest Shortest Time  PregnancyPodcast Mikayla Santana    Book Recommendations:   Batsheva Rufus's Birthing From Within--first few chapters include a new-age tone, you may prefer to skip it and keep going, because there is good stuff later.  This book recommendation covers emotional preparation, but does cover coping with pain, and use of both pharmacological and  "nonpharmacological methods.    Guide to Childbirth by Alysa Rojas  Childbirth Without Fear by Mateo Banegas Read    Dr. Salinas' The Pregnancy Book and The Birth Book--the pregnancy book goes month-by month    The Birth Partner by Tressa High    Womanly Art of Breastfeeding by La Leche League International   Bestfeeding by Rena Loja--great pictures    Mothering Your Nursing Toddler, by Susannah Blanco.   Addresses dealing with so many of the challenging behaviors of a nursing toddler.  How Weaning Happens, by La Leche League.  Discusses weaning at all ages, from medically necessary weaning of an infant, all the way up to age 5 (or older), with why/why not, and strategies.  Very empowering book both for deciding to wean and deciding not to.    American College of Nurse-Midwives (ACNM) http://www.midwife.org/; look at the informational handouts at http://www.midwife.org/Share-With-Women     www.mymidwife.org    Mother to Baby (Medication and Herbal guidance in pregnancy): http://www.mothertobaby.org  Toll-Free Hotline: 131.530.4578  LactMed (Medication use while breastfeeding): http://toxnet.nlm.nih.gov/newtoxnet/lactmed.htm    Women's Health.gov:  http://www.womenshealth.gov/a-z-topics/index.html    American pregnancy association - http://americanpregnancy.org    Centering Pregnancy (group prenatal care option): http://centeringhealthcare.org    March of Dimes www.Haoguihua.com     FDA - Nutrition  www.mypyramid.gov  Under \"For Consumers,\" click on \"pregnant and breastfeeding women.\"      Centers for Disease Control and Prevention (CDC) - Vaccines : http://www.cdc.gov/vaccines/       When researching information on the web, question the validity of websites.  The domains .gov, .edu and.org tend to be more reliable information.  If there are a lot of advertisements, be cautious of the information provided. Stay away from blogs and chat rooms please!   "

## 2023-10-14 NOTE — TELEPHONE ENCOUNTER
TC:  Paging this CNM regarding cold and what remedies are safe in pregnancy.  Indigo clearly has laryngitis while talking on the phone with this CNM, mild coughing and sniffling.  Reports started Tuesday 10/10 with sore throat x 2 days, feeling improved and returned to work Friday and then laryngitis began. Some difficulty sleeping at night s/t coughing.  Still has IOB folder with handouts on safe medications to take during pregnancy, referred to this handout.  Also discussed home remedies for comfort such as humidifier, saline rinses, good hydration and nutrition, cough drops, tea with honey/lemon/ginger, Vitamin C supplementation.  Encouraged rest and adequate sleep as able.  Note given for work.  Has next prenatal visit this coming Wednesday and encouraged to keep, advised may need to mask.  May also reach out for in person or virtual visit sooner if needed/desired.  No further questions at this time, agreeable with plan, and will reach out further as needed/desired.    ATUL Lancaster CNM   10/14/2023  4:58 PM

## 2023-10-22 ENCOUNTER — HEALTH MAINTENANCE LETTER (OUTPATIENT)
Age: 23
End: 2023-10-22

## 2023-10-31 ENCOUNTER — PRENATAL OFFICE VISIT (OUTPATIENT)
Dept: MIDWIFE SERVICES | Facility: CLINIC | Age: 23
End: 2023-10-31
Payer: COMMERCIAL

## 2023-10-31 ENCOUNTER — TRANSCRIBE ORDERS (OUTPATIENT)
Dept: MATERNAL FETAL MEDICINE | Facility: HOSPITAL | Age: 23
End: 2023-10-31

## 2023-10-31 VITALS — DIASTOLIC BLOOD PRESSURE: 76 MMHG | BODY MASS INDEX: 41.16 KG/M2 | SYSTOLIC BLOOD PRESSURE: 130 MMHG | WEIGHT: 228.7 LBS

## 2023-10-31 DIAGNOSIS — O26.90 PREGNANCY RELATED CONDITION: Primary | ICD-10-CM

## 2023-10-31 DIAGNOSIS — O09.91 SUPERVISION OF HIGH RISK PREGNANCY, UNSPECIFIED, FIRST TRIMESTER: Primary | ICD-10-CM

## 2023-10-31 DIAGNOSIS — E66.01 MORBID OBESITY (H): ICD-10-CM

## 2023-10-31 PROCEDURE — 99207 PR PRENATAL VISIT: CPT | Performed by: MIDWIFE

## 2023-10-31 PROCEDURE — 82947 ASSAY GLUCOSE BLOOD QUANT: CPT | Performed by: MIDWIFE

## 2023-10-31 PROCEDURE — 80061 LIPID PANEL: CPT | Performed by: MIDWIFE

## 2023-10-31 PROCEDURE — 36415 COLL VENOUS BLD VENIPUNCTURE: CPT | Performed by: MIDWIFE

## 2023-10-31 NOTE — PROGRESS NOTES
"Indigo is here today for a routine prenatal visit at 16w4d gestation. Not feeling baby move yet, reviewed quickening. Denies cramping or change in vaginal discharge. Having some \"stretching\" pains in lower abdomen. Had an episode at work when she felt light headed and had to sit down. Did not faint. Has mild headache. Discussed importance of hydration and nutrition. Asked to have biometric markers drawn for work forms, will get random glucose and non fasting lipid profile today. Has number for on call midwife and knows when to call prn. Referral for MFM placed on chart, to have level II ultrasound next month.   "

## 2023-11-01 LAB
CHOLEST SERPL-MCNC: 215 MG/DL
FASTING STATUS PATIENT QL REPORTED: NO
GLUCOSE SERPL-MCNC: 79 MG/DL (ref 70–99)
HDLC SERPL-MCNC: 70 MG/DL
LDLC SERPL CALC-MCNC: 113 MG/DL
NONHDLC SERPL-MCNC: 145 MG/DL
TRIGL SERPL-MCNC: 158 MG/DL

## 2023-11-15 ENCOUNTER — PRE VISIT (OUTPATIENT)
Dept: MATERNAL FETAL MEDICINE | Facility: HOSPITAL | Age: 23
End: 2023-11-15
Payer: COMMERCIAL

## 2023-11-21 ENCOUNTER — OFFICE VISIT (OUTPATIENT)
Dept: MATERNAL FETAL MEDICINE | Facility: HOSPITAL | Age: 23
End: 2023-11-21
Attending: OBSTETRICS & GYNECOLOGY
Payer: COMMERCIAL

## 2023-11-21 ENCOUNTER — ANCILLARY PROCEDURE (OUTPATIENT)
Dept: ULTRASOUND IMAGING | Facility: HOSPITAL | Age: 23
End: 2023-11-21
Attending: OBSTETRICS & GYNECOLOGY
Payer: COMMERCIAL

## 2023-11-21 DIAGNOSIS — O99.212 OBESITY AFFECTING PREGNANCY IN SECOND TRIMESTER, UNSPECIFIED OBESITY TYPE: Primary | ICD-10-CM

## 2023-11-21 DIAGNOSIS — O26.90 PREGNANCY RELATED CONDITION: ICD-10-CM

## 2023-11-21 PROCEDURE — 76811 OB US DETAILED SNGL FETUS: CPT

## 2023-11-21 PROCEDURE — 76811 OB US DETAILED SNGL FETUS: CPT | Mod: 26 | Performed by: OBSTETRICS & GYNECOLOGY

## 2023-11-21 PROCEDURE — 99207 PR NO CHARGE LOS: CPT | Performed by: OBSTETRICS & GYNECOLOGY

## 2023-11-21 NOTE — NURSING NOTE
Indigo SERGIO ValleRomerofabian Bright is a  at 19w4d who presents to [facility] for L2 ultrasound. Pt denies bldg/lof/change in discharge, contractions, headache, vision changes, chest pain/SOB or edema. SBAR given to Dr. Saini, see note in Epic.

## 2023-11-21 NOTE — PROGRESS NOTES
Please see full imaging report from [facility] program under imaging tab.    Rj Saini MD  Maternal Fetal Medicine

## 2023-11-29 ENCOUNTER — PRENATAL OFFICE VISIT (OUTPATIENT)
Dept: MIDWIFE SERVICES | Facility: CLINIC | Age: 23
End: 2023-11-29
Payer: COMMERCIAL

## 2023-11-29 VITALS
BODY MASS INDEX: 41.02 KG/M2 | TEMPERATURE: 98.4 F | HEART RATE: 84 BPM | SYSTOLIC BLOOD PRESSURE: 110 MMHG | DIASTOLIC BLOOD PRESSURE: 70 MMHG | HEIGHT: 63 IN | WEIGHT: 231.5 LBS

## 2023-11-29 DIAGNOSIS — O09.91 SUPERVISION OF HIGH RISK PREGNANCY, UNSPECIFIED, FIRST TRIMESTER: Primary | ICD-10-CM

## 2023-11-29 DIAGNOSIS — Z23 NEED FOR PROPHYLACTIC VACCINATION AND INOCULATION AGAINST INFLUENZA: ICD-10-CM

## 2023-11-29 PROCEDURE — 90471 IMMUNIZATION ADMIN: CPT | Performed by: MIDWIFE

## 2023-11-29 PROCEDURE — 90686 IIV4 VACC NO PRSV 0.5 ML IM: CPT | Performed by: MIDWIFE

## 2023-11-29 PROCEDURE — 99207 PR PRENATAL VISIT: CPT | Performed by: MIDWIFE

## 2023-11-29 NOTE — PROGRESS NOTES
Indigo is here today with Austyn for a routine prenatal visit at 20w5d gestation. Feeling the baby move since last week or so. Baby active. Denies cramping or change in vaginal discharge. Fetal survey ultrasound completed at [facility], some limited views so has follow up [place] planned for 12/13 to complete survey. [place], discussed travel precautions and warning signs with when to call prn. Has number for on call midwife. No concerns today. RTC 45 weeks. Plan to do one hour glucose next visit.

## 2023-11-29 NOTE — PATIENT INSTRUCTIONS
"\"We hope you had a positive experience and that you can definitely recommend [facility] to your family and friends. You ll be receiving a survey soon and we look forward to hearing your feedback\".    [facility] Contact information:  Appointment line and to get a hold of CNM in clinic Monday-Friday 8 am - 5 pm:  [phone].  There are some clinics with early start times (1st appointment 7:40 am) and others with evening hours (last appointment 6:20 pm).  Most are typically open from 8 am to 5 pm.    CNM on call answering service: [phone].  Specify your hospital of choice and leave a brief message for CNM;  will then page CNM who is on call at your specified hospital and you should receive a call back with 15 minutes.  Be sure that your ringer is audible and that you can accept blocked calls so that we can get back in touch with you! This number should be reserved for urgent needs if during the day, before 8 am, after 5 pm, weekends, holidays.    Contact the on-call CNM with warning signs, such as:  vaginal bleeding   Vaginal discharge and itching or pain and burning during urination  Leg/calf pain or swelling on one side  severe abdominal pain  nausea and vomiting more than 4-5 times a day, or if you are unable to keep anything down  fever more than 100.4 degrees F.   [place]  After each of your visits you are welcome to check Franchisee Gladiator for your visit summary including education and links to information relevant to your pregnancy and/or well woman care.   Find the \"Visits\" tab at the top of the page, you will see a list of recent visits and for each visit a for link for \"View After Visit Summary.\" View of your After Visit Summary will allow you to read our recommendations from your visit, review any education provided, and link to websites with useful information.   If you have any questions or difficulty navigating Spiration, please feel free to contact " "us and we will do our best to direct you.  Meet the Midwives from [facility]  You are invited to an informational meet and greet with [facility] Certified Nurse-Midwives. Our free \"Meet the Midwives\" event is a great opportunity to learn about our midwives' philosophy and experience, the hospitals where we can assist with your birth, and answer questions you may have. Partners, friends, and family are welcome to attend. Currently, this is a virtual event.  Date  Last Thursday of every month at 7 pm.    Link to next (live) meeting  [email or web address].[email or web address]/meet-the-midwives  To Join by Telephone (audio only) Call:   [phone] Phone Conference ID: [phone] #    Childbirth and Parenting Education:     Everyday Miracles:   https://www.everyday-miracles.org/    Free Video Series from [facility]: https://nursing.[facility]/academics/specialty-areas/nurse-midwifery/having-baby-prenatal-videos/having-baby-prenatal-and    Childbirth Education virtual (live) classes: www.FARR Technologies/classes  The Birth Hour: https://Avidity NanoMedicines/online-childbirth-class/  BirthED: https://www.birthedmn.com/  KARL parenting center: http://amMyMichigan Medical CenteringCloud Health Care.Consolidated Energy/   ([phone] 545-AQLI  [place]: (education, yoga & wellness) www.coRank  Enlightened Mama: www.enlightenedmama.com   Childbirth collective: (Parent topic nights)  www.childbirthcollective.org/  Hypnobabies:  www.hypnobabiest9sky.comties.com/  Hypnobirthing:  Http://hypnITIS HoldingsrtJoy Media Group.[employer]olidated [employer]/  Hypnobirthing virtual class: www.GenKyoTex/hypnobirthing    Information about doulas:  Childbirth collective: http://www.childbirthcollective.org/  Doulas of [place] (LORNA):  www.lorna.org  [facility]  project: http://Simplicissimus Book FarmtiesdoulaJUNTA.CLject.com/     Early Childhood and Family Education (ECFE):  ECFE offers parents hands-on learning experiences that will nourish a lifetime of teachable " moments.  http://ecfe.info/ecfe-home/    APPS and Podcasts:   Andreia Cesar Nurrosalie    Evidence Based Birth  The Birth Hour (for birth stories)   Birthful   Expectful   The Longest Shortest Time  PregnancyPodcast Mikayla Mersebastian    Book Recommendations:   Batsheva Rufus's Birthing From Within--first few chapters include a new-age tone, you may prefer to skip it and keep going, because there is good stuff later.  This book recommendation covers emotional preparation, but does cover coping with pain, and use of both pharmacological and nonpharmacological methods.    Guide to Childbirth by Alysa Rojas  Childbirth Without Fear by Mateo Banegas Read    Dr. Salinas' The Pregnancy Book and The Birth Book--the pregnancy book goes month-by month    The Birth Partner by Tressa High    Womanly Art of Breastfeeding by La Leche League International   Bestfeeding by Rena Loja--great pictures    Mothering Your Nursing Toddler, by Susannah Blanco.   Addresses dealing with so many of the challenging behaviors of a nursing toddler.  How Weaning Happens, by La Leche League.  Discusses weaning at all ages, from medically necessary weaning of an infant, all the way up to age 5 (or older), with why/why not, and strategies.  Very empowering book both for deciding to wean and deciding not to.    American College of Nurse-Midwives (ACNM) http://www.midwife.org/; look at the informational handouts at http://www.midwife.org/Share-With-Women     www.mymidwife.org    Mother to Baby (Medication and Herbal guidance in pregnancy): http://www.mothertobaby.org  Toll-Free Hotline: 162.473.4642  LactMed (Medication use while breastfeeding): http://toxnet.nlm.nih.gov/newtoxnet/lactmed.htm    Women's Health.gov:  http://www.womenshealth.gov/a-z-topics/index.html    American pregnancy association - http://americanpregnancy.org    Centering Pregnancy (group prenatal care option): http://centeringhealthcare.org    March of [employer] www.Calm    "  FDA - Nutrition  www.mypyramid.gov  Under \"For Consumers,\" click on \"pregnant and breastfeeding women.\"      Centers for Disease Control and Prevention (CDC) - Vaccines : http://www.cdc.gov/vaccines/       When researching information on the web, question the validity of websites.  The Green Is Good .gov, .edu and.org tend to be more reliable information.  If there are a lot of advertisements, be cautious of the information provided. Stay away from blogs and chat rooms please!     Baby Feeding in the Hospital: Information, Support and Resources    As you prepare for the birth of your child, you will want to consider options for feeding your baby including breast-feeding and/or baby formula. The American Academy of Pediatrics recommends exclusive breast-feeding for the first six months (although any amount of breast-feeding is beneficial).  However, we also understand that breast-feeding is a personal choice and not for everyone. Whether or not you choose to breast-feed, your decision will be respected by our staff.    There are numerous benefits of breast-feeding; here are a few to consider:  Provides antibodies to protect your baby from infections and diseases  The cost: formula can cost over $1,500 per year  Convenience, no warming up or sterilizing bottles and supplies  The physical contact with breastfeeding can make babies feel secure, warm and comforted    What ever my feeding choice, what can I expect after I deliver my baby?  Your baby will usually be placed skin-to-skin immediately following birth. The skin to skin contact between you and your baby will be a special and memorable time. The bonding and attachment comforts your baby and has a positive effect on baby s brain development.   Having your baby  room in  with you also helps you start to learn your baby s body rhythms and sleep cycle.    You will also begin to learn your baby s cues (signals) that he or she is ready to feed.    When do I start to feed " my baby?  As soon as possible after your baby s birth, you will be encouraged to begin feeding.  In the first couple of weeks, your baby will eat often.  Breastfeeding babies usually eat at least 8 times in 24 hours.  Babies fed formula usually eat at least 7 times in 24 hours.      Breast-feeding tips:  Get comfortable and use pillows for support.  Have your baby at the level of your breast, facing you,  tummy to tummy .    Touch your nipple to your baby s lips so you baby s mouth opens wide (rooting reflex).  Aim the nipple toward the roof of your baby s mouth. When your baby opens his or her mouth, pull your baby toward your breast to help your baby  latch on  to your nipple and much of the areola area.  Hand expressing your breast milk can assist with latching your baby to your breast, if needed.  Ask for help, breastfeeding may seem awkward or uncomfortable at first, this is normal. There are numerous resources available at [facility] ([place] and [facility]), Clinics and beyond.   If your goal is to exclusively breastfeed, avoid using any formula or artificial nipples (including bottles and pacifiers) while you are your baby are learning to breastfeed unless there is a medical reason.     Mixing breastfeeding and formula can interfere with how you begin building your milk supply.  It can impact how you and your baby  learn  to breastfeeding together and alter the natural growth of  good  bacteria in your baby s stomach.  Delay a pacifier or a bottle in the first few weeks until breastfeeding is well established. This is often around 3 weeks of age.  Ask your nurse to show you how to hand express.   Breast milk can be kept in the refrigerator or freezer for later use.        Touring the Maternity Care Center  [facility] Maternity Care:   https://[facility].org/locations/the-birthplace-at--[facility]  Maternity Care:   https://[facility].org/locations/the-birthplace-at--[facility]  Scroll to the bottom of this page if the above link does not work      Hospital and Clinic Breastfeeding Resources:  -Schedule an appointment with a [facility] Nurse [facility]wives [facility]   CNM who is also a Lactation Consultant by calling [phone]     -Schedule a clinic appointment with a [facility] Nurse Mid[facility] CN with dedicated clinic hours for breastfeeding assistance by calling [phone]. Breastfeeding clinic visits are at [facility] on , [facility]3] on  and [facility] on .     New Parent Connection:   [facility], [address] [place] [address] [place]  In-person meetings on  from 6 pm - 7:15 pm for parents of  to 9 months, at the same site.   All are free, drop-in, no registration required.    There are also free virtual meetings ongoing on :  11:30 am - 12:30 pm for parents of newborns to 3 months  4:15 pm to 5:15 pm for parents of 3 to 9-month olds  For joining info parents should call Maria G Flores at [phone]      [facility] Baby Café  More information  Amara Saldana  [phone]  [email or web address].     -Attend a baby weigh in at [facility].  Lactation consultants are available to answer questions  [place]:  1:00 - 2:00  [facility]:  1:00 - 2:00  www.Cellrox.Moko Social Media    -Attend one of the New Mama groups at [facility] in [facility].  [facility] also offers one-on-one in home and in office lactation consults.   www.[facility].com    -Attend a LeLeche League meeting.  Multiple groups in several locations throughout the [facility]. The meetings are no-cost and always informative breastfeeding education session through Internatal La Leche League  Www.lllofmndas.org/    -Medication use while breastfeeding:  "http://toxnet.nlm.nih.gov/newtoxnet/lactmed.htm     Online Resources:  Breastfeedingmadesimple.com  Llli.org (La Leche Lisa)  Normalfed.com  Womenshealth.gov/breastfeeding  Workandpump.com    Breast-feeding Supplies & Pumps:  Talk to your insurance provider or WIC (Women, Infants and Children) to learn more about options available to you. Recent health insurance changes may include additional coverage for supplies and pumps.    Public Health:  Women, Infants and Children Nutrition program (WIC): provides breast-feeding support and education in addition to formal feeding moms. 441-PBA-6801 or http://www.health.[facility].mn.us/divs/fh/wic    Family Health Home Visiting: [facility] Nurse home visits are available. Talk to your provider to see if you qualify. Most [facility] have a program available.    Additional Resources:  La Leche Lisa is an international, nonprofit, nonsectarian organization offering information, education, and support to mothers who want to breast-feed their babies. Local groups offer phone help and monthly meetings. Visit [place].[employer] News or FileString and us the  Find local support  drop down menu or click on the  Resources  tab.    [place] Breastfeeding Resources: 5-018-421-BABY (2229) toll free    National Breastfeeding Help Line trained breastfeeding peer counselors can help answer common breast-feeding questions by phone. Monday-Friday: English/Turkmen  4-034- [phone] toll free, 1-611.642.6985 (TTY)    Virtual Breastfeeding Support:    During this time of isolation, breastfeeding families need even more community!  Here are some area organizations offering virtual support groups for breastfeeding:    [facility] Cafe Support Group, Tuesdays at 10:30 am   Run by DANTE Ray of The Baby Whisperer Lactation Consultants   Go to The Baby Whisperer Lactation Consultants Facebook page and click on \"events\" for link   https://www.facebook.com/events/361197700717483/  [facility] Milk Hour, "  at 2:30 pm    Run by DANTE Wills   Go to [facility] FB page and send message to get link   https://www.Audioscribe.Elegant Service/[facility]/  Pal [facility]/[place] holding virtual meetings the first Tuesday of each month, 8-9 pm, and the   Third Saturday, 10 - 11 am.  Go to La Leche [facility] and [place] FB page; message to get link https://www.Audioscribe.Elegant Service/LLLofGoldRosette/?hc_location=[facility]  [place] offers a Lactation Lounge every Friday 12pm - 1pm, run by Pal Ferreira Leader   Sign up via link at https://www.JumpOffCampus/cbe-lactation  [facility] is offering virtual support groups every Monday, 10:30 am - 12 pm, run by nurse DANTE   Https://www.Audioscribe.com/events/[email or web address]/    Prenatal Breastfeeding Classes:      [facility]alo is offering virtual breastfeeding and  care classes:  https://www.JumpOffCampus/education-workshops  BirthEd childbirth and breastfeeding education offering virtual prenatal breastfeeding classes  https://www.birthedmn.com/workshops

## 2023-12-13 ENCOUNTER — OFFICE VISIT (OUTPATIENT)
Dept: MATERNAL FETAL MEDICINE | Facility: HOSPITAL | Age: 23
End: 2023-12-13
Attending: OBSTETRICS & GYNECOLOGY
Payer: COMMERCIAL

## 2023-12-13 ENCOUNTER — DOCUMENTATION ONLY (OUTPATIENT)
Dept: MIDWIFE SERVICES | Facility: CLINIC | Age: 23
End: 2023-12-13

## 2023-12-13 ENCOUNTER — ANCILLARY PROCEDURE (OUTPATIENT)
Dept: ULTRASOUND IMAGING | Facility: HOSPITAL | Age: 23
End: 2023-12-13
Attending: OBSTETRICS & GYNECOLOGY
Payer: COMMERCIAL

## 2023-12-13 DIAGNOSIS — O35.9XX0 FETAL ABNORMALITY AFFECTING MANAGEMENT OF MOTHER, ANTEPARTUM, SINGLE OR UNSPECIFIED FETUS: Primary | ICD-10-CM

## 2023-12-13 DIAGNOSIS — O99.212 OBESITY AFFECTING PREGNANCY IN SECOND TRIMESTER, UNSPECIFIED OBESITY TYPE: ICD-10-CM

## 2023-12-13 PROCEDURE — 76816 OB US FOLLOW-UP PER FETUS: CPT | Mod: 26 | Performed by: OBSTETRICS & GYNECOLOGY

## 2023-12-13 PROCEDURE — 76816 OB US FOLLOW-UP PER FETUS: CPT

## 2023-12-13 PROCEDURE — 99207 PR NO CHARGE LOS: CPT | Performed by: OBSTETRICS & GYNECOLOGY

## 2023-12-13 NOTE — PROGRESS NOTES
Please see full imaging report from [facility] program under imaging tab.    Fetal echo scheduled given suboptimal cardiac imaging x 2.    Rj Saini MD  Maternal Fetal Medicine

## 2023-12-27 ENCOUNTER — PRENATAL OFFICE VISIT (OUTPATIENT)
Dept: MIDWIFE SERVICES | Facility: CLINIC | Age: 23
End: 2023-12-27
Payer: COMMERCIAL

## 2023-12-27 VITALS
DIASTOLIC BLOOD PRESSURE: 70 MMHG | SYSTOLIC BLOOD PRESSURE: 130 MMHG | WEIGHT: 233.4 LBS | HEART RATE: 80 BPM | HEIGHT: 63 IN | BODY MASS INDEX: 41.36 KG/M2

## 2023-12-27 DIAGNOSIS — O09.91 SUPERVISION OF HIGH RISK PREGNANCY, UNSPECIFIED, FIRST TRIMESTER: Primary | ICD-10-CM

## 2023-12-27 DIAGNOSIS — M54.50 LOW BACK PAIN, UNSPECIFIED BACK PAIN LATERALITY, UNSPECIFIED CHRONICITY, UNSPECIFIED WHETHER SCIATICA PRESENT: ICD-10-CM

## 2023-12-27 LAB
GLUCOSE 1H P 50 G GLC PO SERPL-MCNC: 123 MG/DL (ref 70–129)
HGB BLD-MCNC: 11 G/DL (ref 11.7–15.7)
HOLD SPECIMEN: NORMAL

## 2023-12-27 PROCEDURE — 36415 COLL VENOUS BLD VENIPUNCTURE: CPT | Performed by: MIDWIFE

## 2023-12-27 PROCEDURE — 82950 GLUCOSE TEST: CPT | Performed by: MIDWIFE

## 2023-12-27 PROCEDURE — 99207 PR PRENATAL VISIT: CPT | Performed by: MIDWIFE

## 2023-12-27 PROCEDURE — 85018 HEMOGLOBIN: CPT | Performed by: MIDWIFE

## 2023-12-27 RX ORDER — ASPIRIN 81 MG/1
81 TABLET ORAL DAILY
Status: ON HOLD | COMMUNITY
End: 2024-04-13

## 2023-12-27 NOTE — PROGRESS NOTES
Indigo is here by herself for her routine prenatal appt at 24w5d gestation. Reviewed ultrasound done at [facility] on 12/13/23, no anomalies noted, limited views of cardiac structures, nl AF, EFW 22% plan follow up with [facility] 1/31/24 for fetal echo and on 2/21/24 for follow up ultrasound. Feeling baby move,  denies cramping/contractions or change in vaginal discharge.Reviewed recommendation for daily aspirin and iron supplementation, initial Hgb=13.9 on 8/23/23. Having some lower abd/perineal pressure. Would like to try maternity support belt. Order placed on chart for DME. Reviewed how to reach CNM with non-urgent and urgent concerns between visits. Advised to make appt in 4 weeks.

## 2023-12-27 NOTE — PATIENT INSTRUCTIONS
"\"We hope you had a positive experience and that you can definitely recommend [facility] to your family and friends. You ll be receiving a survey soon and we look forward to hearing your feedback\".    [facility] Contact information:  Appointment line and to get a hold of CNM in clinic Monday-Friday 8 am - 5 pm:  [phone].  There are some clinics with early start times (1st appointment 7:40 am) and others with evening hours (last appointment 6:20 pm).  Most are typically open from 8 am to 5 pm.    CNM on call answering service: [phone].  Specify your hospital of choice and leave a brief message for CNM;  will then page CNM who is on call at your specified hospital and you should receive a call back with 15 minutes.  Be sure that your ringer is audible and that you can accept blocked calls so that we can get back in touch with you! This number should be reserved for urgent needs if during the day, before 8 am, after 5 pm, weekends, holidays.    Contact the on-call CNM with warning signs, such as:  vaginal bleeding   Vaginal discharge and itching or pain and burning during urination  Leg/calf pain or swelling on one side  severe abdominal pain  nausea and vomiting more than 4-5 times a day, or if you are unable to keep anything down  fever more than 100.4 degrees F.   ScanÃ¢â‚¬Â¢Jourhart  After each of your visits you are welcome to check Askuity for your visit summary including education and links to information relevant to your pregnancy and/or well woman care.   Find the \"Visits\" tab at the top of the page, you will see a list of recent visits and for each visit a for link for \"View After Visit Summary.\" View of your After Visit Summary will allow you to read our recommendations from your visit, review any education provided, and link to websites with useful information.   If you have any questions or difficulty navigating HipLogiq, please feel free to contact " "us and we will do our best to direct you.  Meet the Midwives from [facility]  You are invited to an informational meet and greet with [facility] Certified Nurse-Midwives. Our free \"Meet the Midwives\" event is a great opportunity to learn about our midwives' philosophy and experience, the hospitals where we can assist with your birth, and answer questions you may have. Partners, friends, and family are welcome to attend. Currently, this is a virtual event.  Date  Last Thursday of every month at 7 pm.    Link to next (live) meeting  https://[facility].org/meet-the-midwives  To Join by Telephone (audio only) Call:   [phone] Phone Conference ID: [phone] #    Childbirth and Parenting Education:     Everyday Miracles:   https://www.everyday-miracles.org/    Free Video Series from [facility]: https://nursing.[facility]/academics/specialty-areas/nurse-midwifery/having-baby-prenatal-videos/having-baby-prenatal-and    Childbirth Education virtual (live) classes: www."[employer]/classes  The Birth Hour: https://Zumba Fitness/online-childbirth-class/  BirthED: https://www.birthedmn.com/  KARL parenting center: http://amBeaumont HospitalingCmed.Jamba!/   (662) 536-IIHQ  Blooma: (education, yoga & wellness) www.Dovme Kosmetics  Enlightened Mama: www.enlightenedmama.com   Childbirth collective: (Parent topic nights)  www.childbirthcollective.org/  Hypnobabies:  www.hypnobabiestClever Cloudties.com/  Hypnobirthing:  Http://hypnPanTerra NetworksrtWescoal Group.Jamba!/  Hypnobirthing virtual class: www.SNRLabs/hypnobirthing    Information about doulas:  Childbirth collective: http://www.childbirthcollective.org/  Doulas of [place] (LORNA):  www.lorna.org  [facility]  project: http://OmedixtiesdoulaScribble Pressject.com/     Early Childhood and Family Education (ECFE):  ECFE offers parents hands-on learning experiences that will nourish a lifetime of teachable " moments.  http://ecfe.info/ecfe-home/    APPS and Podcasts:   Andreia Cesar Nurrosalie    Evidence Based Birth  The Birth Hour (for birth stories)   Birthful   Expectful   The Longest Shortest Time  PregnancyPodcast Mikayla Mersebastian    Book Recommendations:   Batsheva Rufus's Birthing From Within--first few chapters include a new-age tone, you may prefer to skip it and keep going, because there is good stuff later.  This book recommendation covers emotional preparation, but does cover coping with pain, and use of both pharmacological and nonpharmacological methods.    Guide to Childbirth by Alysa Rojas  Childbirth Without Fear by Mateo Banegas Read    Dr. Salinas' The Pregnancy Book and The Birth Book--the pregnancy book goes month-by month    The Birth Partner by Tressa Hihg    Womanly Art of Breastfeeding by La Leche League International   Bestfeeding by Rena Loja--great pictures    Mothering Your Nursing Toddler, by Susannah Blanco.   Addresses dealing with so many of the challenging behaviors of a nursing toddler.  How Weaning Happens, by La Leche League.  Discusses weaning at all ages, from medically necessary weaning of an infant, all the way up to age 5 (or older), with why/why not, and strategies.  Very empowering book both for deciding to wean and deciding not to.    American College of Nurse-Midwives (ACNM) http://www.midwife.org/; look at the informational handouts at http://www.midwife.org/Share-With-Women     www.mymidwife.org    Mother to Baby (Medication and Herbal guidance in pregnancy): http://www.mothertobaby.org  Toll-Free Hotline: 991.985.4801  LactMed (Medication use while breastfeeding): http://toxnet.nlm.nih.gov/newtoxnet/lactmed.htm    Women's Health.gov:  http://www.womenshealth.gov/a-z-topics/index.html    American pregnancy association - http://americanpregnancy.org    Centering Pregnancy (group prenatal care option): http://centeringhealthcare.org    March of GradeBeam www.POKKT    "  FDA - Nutrition  www.mypyramid.gov  Under \"For Consumers,\" click on \"pregnant and breastfeeding women.\"      Centers for Disease Control and Prevention (CDC) - Vaccines : http://www.cdc.gov/vaccines/       When researching information on the web, question the validity of websites.  The Makara .gov, .edu and.org tend to be more reliable information.  If there are a lot of advertisements, be cautious of the information provided. Stay away from blogs and chat rooms please!     Baby Feeding in the Hospital: Information, Support and Resources    As you prepare for the birth of your child, you will want to consider options for feeding your baby including breast-feeding and/or baby formula. The American Academy of Pediatrics recommends exclusive breast-feeding for the first six months (although any amount of breast-feeding is beneficial).  However, we also understand that breast-feeding is a personal choice and not for everyone. Whether or not you choose to breast-feed, your decision will be respected by our staff.    There are numerous benefits of breast-feeding; here are a few to consider:  Provides antibodies to protect your baby from infections and diseases  The cost: formula can cost over $1,500 per year  Convenience, no warming up or sterilizing bottles and supplies  The physical contact with breastfeeding can make babies feel secure, warm and comforted    What ever my feeding choice, what can I expect after I deliver my baby?  Your baby will usually be placed skin-to-skin immediately following birth. The skin to skin contact between you and your baby will be a special and memorable time. The bonding and attachment comforts your baby and has a positive effect on baby s brain development.   Having your baby  room in  with you also helps you start to learn your baby s body rhythms and sleep cycle.    You will also begin to learn your baby s cues (signals) that he or she is ready to feed.    When do I start to feed " my baby?  As soon as possible after your baby s birth, you will be encouraged to begin feeding.  In the first couple of weeks, your baby will eat often.  Breastfeeding babies usually eat at least 8 times in 24 hours.  Babies fed formula usually eat at least 7 times in 24 hours.      Breast-feeding tips:  Get comfortable and use pillows for support.  Have your baby at the level of your breast, facing you,  tummy to tummy .    Touch your nipple to your baby s lips so you baby s mouth opens wide (rooting reflex).  Aim the nipple toward the roof of your baby s mouth. When your baby opens his or her mouth, pull your baby toward your breast to help your baby  latch on  to your nipple and much of the areola area.  Hand expressing your breast milk can assist with latching your baby to your breast, if needed.  Ask for help, breastfeeding may seem awkward or uncomfortable at first, this is normal. There are numerous resources available at [facility]), Clinics and beyond.   If your goal is to exclusively breastfeed, avoid using any formula or artificial nipples (including bottles and pacifiers) while you are your baby are learning to breastfeed unless there is a medical reason.     Mixing breastfeeding and formula can interfere with how you begin building your milk supply.  It can impact how you and your baby  learn  to breastfeeding together and alter the natural growth of  good  bacteria in your baby s stomach.  Delay a pacifier or a bottle in the first few weeks until breastfeeding is well established. This is often around 3 weeks of age.  Ask your nurse to show you how to hand express.   Breast milk can be kept in the refrigerator or freezer for later use.        Touring the Maternity Care Center  [facility] Maternity Care:   https://[facility].org/locations/the-birthplace-at--[facility]  Maternity Care:   https://[facility].org/locations/the-birthplace-at--[facility]  Scroll to the bottom of this page if the above link does not work      Hospital and Clinic Breastfeeding Resources:  -Schedule an appointment with a [facility] Nurse Midwives [facility]   CNM who is also a Lactation Consultant by calling [phone]     -Schedule a clinic appointment with a [facility] Nurse Midwives [facility] CN with dedicated clinic hours for breastfeeding assistance by calling [phone]. Breastfeeding clinic visits are at [facility] on , [facility] on  and [facility] on .     New Parent Connection:   [facility], [address] [place] [address] [place]  In-person meetings on  from 6 pm - 7:15 pm for parents of  to 9 months, at the same site.   All are free, drop-in, no registration required.    There are also free virtual meetings ongoing on :  11:30 am - 12:30 pm for parents of newborns to 3 months  4:15 pm to 5:15 pm for parents of 3 to 9-month olds  For joining info parents should call Maria G Flores at [phone]      [facility] Baby Café  More information  Amara Saldana  [phone]  [email or web address].     -Attend a baby weigh in at [facility].  Lactation consultants are available to answer questions  [facility]:  1:00 - 2:00  [facility]:  1:00 - 2:00  www.CoverHound.Outbrain    -Attend one of the New Mama groups at [facility] in [facility].  [facility] also offers one-on-one in home and in office lactation consults.   www.[facility].com    -Attend a LeLeche League meeting.  Multiple groups in several locations throughout the [facility]. The meetings are no-cost and always informative breastfeeding education session through Internatal La Leche League  Www.lllofmndas.org/    -Medication use while breastfeeding:  "http://toxnet.nlm.nih.gov/newtoxnet/lactmed.htm     Online Resources:  Breastfeedingmadesimple.com  Llli.org (La Leche Lisa)  Normalfed.com  Womenshealth.gov/breastfeeding  Workandpump.com    Breast-feeding Supplies & Pumps:  Talk to your insurance provider or WIC (Women, Infants and Children) to learn more about options available to you. Recent health insurance changes may include additional coverage for supplies and pumps.    Public Health:  Women, Infants and Children Nutrition program (WIC): provides breast-feeding support and education in addition to formal feeding moms. 876-BFN-9873 or http://www.health.[facility].mn.us/divs/fh/wic    Family Health Home Visiting: [facility] Nurse home visits are available. Talk to your provider to see if you qualify. Most [facility] have a program available.    Additional Resources:  La Leche Lisa is an international, nonprofit, nonsectarian organization offering information, education, and support to mothers who want to breast-feed their babies. Local groups offer phone help and monthly meetings. Visit Quik.io.Gnzo or Betify and us the  Find local support  drop down menu or click on the  Resources  tab.    [place] Breastfeeding Resources: 9-211-973-BABY (2229) toll free    National Breastfeeding Help Line trained breastfeeding peer counselors can help answer common breast-feeding questions by phone. Monday-Friday: English/Montenegrin  2-656- 139-8967 toll free, 1-720.819.4163 (TTY)    Virtual Breastfeeding Support:    During this time of isolation, breastfeeding families need even more community!  Here are some area organizations offering virtual support groups for breastfeeding:    [facility] Cafe Support Group, Tuesdays at 10:30 am   Run by DANTE Ray of The Baby Whisperer Lactation Consultants   Go to The Baby Whisperer Lactation Consultants Facebook page and click on \"events\" for link   https://www.facebook.com/events/229549641530424/  [facility] Milk Hour, "  at 2:30 pm    Run by DANTE Wills   Go to [facility] FB page and send message to get link   https://www.Nutrigreen.Prime Focus Technologies/[facility]/  Pal Gonzalez [facility] holding virtual meetings the first Tuesday of each month, 8-9 pm, and the   Third Saturday, 10 - 11 am.  Go to La Leche [facility] [place] FB page; message to get link https://www.Nutrigreen.Prime Focus Technologies/LLLofGoldRosette/?hc_location=[facility]  Bloom offers a Lactation Lounge every Friday 12pm - 1pm, run by Pal Ferreira Leader   Sign up via link at https://www.Bellabeat/cbe-lactation  [facility] is offering virtual support groups every Monday, 10:30 am - 12 pm, run by nurse DANTE   Https://www.Nutrigreen.com/events/[email or web address]/    Prenatal Breastfeeding Classes:      Galo is offering virtual breastfeeding and  care classes:  https://www.Bellabeat/education-workshops  BirthEd childbirth and breastfeeding education offering virtual prenatal breastfeeding classes  https://www.birthedmn.com/workshops

## 2024-01-23 PROBLEM — O09.93 SUPERVISION OF HIGH RISK PREGNANCY IN THIRD TRIMESTER: Status: ACTIVE | Noted: 2023-08-23

## 2024-01-23 NOTE — PROGRESS NOTES
Indigo Bright is here for SONJA at 28w5d. Here with Austyn.  Wondering if IOL in 39th week is still recommended. We discussed rationale for recommendation and that expectant mgmt is considered for some who favor awaiting spontaneous labor along with continued weekly  surveillance. Discussed weekly BPPs typically start at 36 weeks for those with BMI >40.  She has a follow up with [facility] on 24 for fetal echo and on 24 for follow up ultrasound.   Fetal movement is active.   No signs of PTL.  Reviewed her labs from last visit. Hgb was 11 and it was recommended that she start an iron supplement with Vitamin C every other day to maintain and possibly improve this level prior to birth. She has started a gummy supplement but plans to switch to a pill as the gummy has a bad flavor. She passed the 1-hour glucose test.   Discussed recommendation for Tdap vaccine and patient accepts today.   She declines RPR today after discussion.  Patient's blood type is A POS.   TWG: -0.272 kg (-9.6 oz) and appropriate.  Uterine size appropriate for dates.  3rd trimester handouts given and reviewed today. Advised on warning signs and when to call.   She is considering her birthing preferences but feels hopeful for a more natural approach with intervention as needed.  RTC 4 weeks.

## 2024-01-24 ENCOUNTER — PRENATAL OFFICE VISIT (OUTPATIENT)
Dept: MIDWIFE SERVICES | Facility: CLINIC | Age: 24
End: 2024-01-24
Payer: COMMERCIAL

## 2024-01-24 VITALS
SYSTOLIC BLOOD PRESSURE: 116 MMHG | WEIGHT: 236.4 LBS | BODY MASS INDEX: 41.89 KG/M2 | HEART RATE: 88 BPM | HEIGHT: 63 IN | DIASTOLIC BLOOD PRESSURE: 64 MMHG

## 2024-01-24 DIAGNOSIS — O09.93 SUPERVISION OF HIGH RISK PREGNANCY IN THIRD TRIMESTER: Primary | ICD-10-CM

## 2024-01-24 PROCEDURE — 99207 PR PRENATAL VISIT: CPT | Performed by: ADVANCED PRACTICE MIDWIFE

## 2024-01-24 PROCEDURE — 90715 TDAP VACCINE 7 YRS/> IM: CPT | Performed by: ADVANCED PRACTICE MIDWIFE

## 2024-01-24 PROCEDURE — 90471 IMMUNIZATION ADMIN: CPT | Performed by: ADVANCED PRACTICE MIDWIFE

## 2024-01-24 NOTE — NURSING NOTE
Prior to immunization administration, verified patients identity using patient s name and date of birth. Please see Immunization Activity for additional information.     Screening Questionnaire for Adult Immunization    Are you sick today?   No   Do you have allergies to medications, food, a vaccine component or latex?   No   Have you ever had a serious reaction after receiving a vaccination?   No   Do you have a long-term health problem with heart, lung, kidney, or metabolic disease (e.g., diabetes), asthma, a blood disorder, no spleen, complement component deficiency, a cochlear implant, or a spinal fluid leak?  Are you on long-term aspirin therapy?   No   Do you have cancer, leukemia, HIV/AIDS, or any other immune system problem?   No   Do you have a parent, brother, or sister with an immune system problem?   No   In the past 3 months, have you taken medications that affect  your immune system, such as prednisone, other steroids, or anticancer drugs; drugs for the treatment of rheumatoid arthritis, Crohn s disease, or psoriasis; or have you had radiation treatments?   No   Have you had a seizure, or a brain or other nervous system problem?   Don't Know   During the past year, have you received a transfusion of blood or blood    products, or been given immune (gamma) globulin or antiviral drug?   No   For women: Are you pregnant or is there a chance you could become       pregnant during the next month?   Yes  pregnant   Have you received any vaccinations in the past 4 weeks?   No     Immunization questionnaire was positive for at least one answer.  Notified Gina Granados CNM.      Patient instructed to remain in clinic for 15 minutes afterwards, and to report any adverse reactions.     Screening performed by Jyoti Cade LPN on 1/24/2024 at 10:56 AM.      \

## 2024-01-24 NOTE — PATIENT INSTRUCTIONS
Weeks 26 to 30 of Your Pregnancy: Care Instructions  You're starting your last trimester. You'll probably feel your baby moving around more. Your back may ache as your body gets used to your baby's size and length. Take care of yourself, and pay attention to what your body needs.    Talk to your doctor about getting the Tdap shot. It will help protect your  against whooping cough (pertussis). Also ask your doctor about flu and COVID-19 shots if you haven't had them yet. If your blood type is Rh negative, you may be given a shot of Rh immune globulin (such as RhoGAM). It can help prevent problems for your baby.   You may have Bart[place]-Eid contractions. They are single or several strong contractions without a pattern. These are practice contractions but not the start of labor.   Be kind to yourself.     Take breaks when you're tired.  Change positions often. Don't sit for too long or stand for too long.  At work, rest during breaks if you can. If you don't get breaks, talk to your doctor about writing a letter to your employer to request them.  Avoid fumes, chemicals, and tobacco smoke.  Be sexual if you want to.     You may be interested in sex, or you may not. Everyone is different.  Sex is okay unless your doctor tells you not to.  Your belly can make it hard to find good positions for sex. [place] and explore.  Watch for signs of  labor.    These signs include:   Menstrual-like cramps. Or you may have pain or pressure in your pelvis that happens in a pattern.  About 6 or more contractions in an hour (even after rest and a glass of water).  A low, dull backache that doesn't go away when you change positions.  An increase or change in vaginal discharge.  Light vaginal bleeding or spotting.  Your water breaking.  Know what to do if you think you are having contractions.     Drink 1 or 2 glasses of water.  Lie down on your left side for at least an hour.  While on your side, feel the top of your  "belly to see if it's tight.  Write down your contractions for an hour. Time how long it is from the start of one contraction to the start of the next.  Call your doctor if you have regular contractions.  Follow-up care is a key part of your treatment and safety. Be sure to make and go to all appointments, and call your doctor if you are having problems. It's also a good idea to know your test results and keep a list of the medicines you take.  Where can you learn more?  Go to https://www.Zapoint.net/patiented  Enter S999 in the search box to learn more about \"Weeks 26 to 30 of Your Pregnancy: Care Instructions.\"  Current as of: July 11, 2023               Content Version: 13.8    5122-0825 [employer].   Care instructions adapted under license by your healthcare professional. If you have questions about a medical condition or this instruction, always ask your healthcare professional. [employer] disclaims any warranty or liability for your use of this information.      Counting Your Baby's Kicks: Care Instructions  Overview     Counting your baby's kicks is one way your doctor can tell that your baby is healthy. You will probably feel your baby move for the first time between 16 and 22 weeks. The movement may feel like flutters rather than kicks. Your baby may move more at certain times of the day. When you are active, you may notice less kicking than when you are resting. At your prenatal visits, your doctor will ask whether the baby is active.  In your last trimester, your doctor may ask you to count the number of times you feel your baby move.  Follow-up care is a key part of your treatment and safety. Be sure to make and go to all appointments, and call your doctor if you are having problems. It's also a good idea to know your test results and keep a list of the medicines you take.  How do you count fetal kicks?  A common method of checking your baby's movement is to note the length " "of time it takes to count 10 movements (such as kicks, flutters, or rolls).  Pick your baby's most active time of day to count. This may be any time from morning to evening.  If you don't feel 10 movements in an hour, have something to eat or drink and count for another hour. If you don't feel at least 10 movements in the 2-hour period, call your doctor.  Do not use an at-home Doppler heart monitor in place of counting fetal movements.  When should you call for help?   Call your doctor now or seek immediate medical care if:    You feel fewer than 10 movements in a 2-hour period.     You noticed that your baby has stopped moving or is moving less than normal.   Watch closely for changes in your health, and be sure to contact your doctor if you have any problems.  Where can you learn more?  Go to https://www.Quantros.[employer]patiented  Enter U048 in the search box to learn more about \"Counting Your Baby's Kicks: Care Instructions.\"  Current as of: July 11, 2023               Content Version: 13.8    1362-4529 [employer].   Care instructions adapted under license by your healthcare professional. If you have questions about a medical condition or this instruction, always ask your healthcare professional. [employer] disclaims any warranty or liability for your use of this information.      Using Nitrous Oxide During Labor  What is nitrous oxide?  Nitrous oxide is a mixture of 50% nitrous oxide gas and 50% oxygen that is inhaled through a mask. Nitrous oxide is better known for use in dental offices or before surgery to help patients relax. Most people know of it as \"laughing gas.\"   How do I use it during labor?  You hold your own mask and begin to inhale the gas mixture about 30 seconds before a contraction begins. Breathing before and during a contraction helps the gas work the best right at the peak of the contraction, providing the greatest relief. It may take 3 to 4 contractions to get used " to the rhythm of breathing the nitrous oxide.   Does nitrous oxide have any side effects?  Some women have reported dizziness, feeling sleepy, dry mouth and nausea (feeling sick to your stomach) with use of nitrous oxide. These side effects often decrease over time. Medicine is available to help ease nausea if it is a concern for you.   Is any extra monitoring required for me or my baby?  No. Your monitoring will not need to change just because you are using nitrous oxide.   Can I still be out of bed and use nitrous oxide?  Yes. Women sometimes feel dizzy when using nitrous oxide. For this reason, you will begin using nitrous oxide while in a bed or chair. If you feel okay, you may get up and walk or use a birthing ball or stool. It will be important that you have someone in the room close by for support.   Can I use nitrous oxide and have IV pain medicines at the same time?  No. The combination of narcotics (injectable pain medications) and nitrous oxide can slow your breathing, so it is not safe for them to be used together. You may use nitrous oxide before receiving an epidural or IV pain medication.  If I use nitrous oxide can I still get an epidural?  Yes. You may wish to use nitrous oxide until you are ready for an epidural. Nitrous oxide can be less effective than an epidural in reducing labor pain. If an epidural is part of your birth plan, it is important that you get your epidural while you are still able to sit for safe placement.   Are there reasons I couldn't use nitrous oxide?   Yes. You cannot use nitrous oxide if you:  Cannot hold your own face mask.  Have received a dose of narcotic in the past few hours (your nurse will discuss this with you).  Have a documented B12 deficiency.  Have one of a very few other rare medical conditions.  Can my labor support person help me with my nitrous oxide?  No! Only you can administer nitrous oxide to yourself. Part of the built-in safety of nitrous oxide is that  you hold your own mask. If anyone in the room is found to be handling the nitrous oxide equipment other than you or your nurse, the nitrous oxide will be removed.  Can I use nitrous oxide with other procedures?   Yes. If nitrous oxide is right for you and your provider agrees, it may be used in these situations:  During the repair of a laceration or episiotomy  Manual removal of your placenta  Blood draws  IV placement  For informational purposes only. Not to replace the advice of your health care provider. Copyright   2019 [facility]. All rights reserved. Clinically reviewed by  [employer] APNL Team. [employer] 527132 - Rev .    Weeks 30 to 32 of Your Pregnancy: Care Instructions  Your baby is growing more every day. Its eyes can open and close, and it may have hair on its head. Your baby may sleep 20 to 45 minutes at a time and is more active at certain times.    You should feel your baby move several times every day. Your baby now turns less and kicks more.   This is a good time to tour your hospital or birthing center. You may also want to find childcare if needed.     To ease heartburn    Avoid foods that make your symptoms worse, such as chocolate, spicy foods, and caffeine.  Avoid bending over or lying down after meals.  Do not eat for 2 hours before bedtime.  Take antacids like Tums, but don't take ones that have sodium bicarbonate, magnesium trisilicate, or aspirin.    To care for large, swollen veins (varicose veins)    Try to avoid standing for long periods of time.  Sit with your feet propped up.  Wear support hose.  Get some exercise every day, like walking or swimming.  Counting your baby's kicks  Your doctor may ask you to count your baby's movements, such as kicks, flutters, or rolls.    Find a quiet place, and get comfortable. Write down your start time. Count your baby's movements (except hiccups). When your baby has moved 10 times, you can stop  "counting. Write down how many minutes it took.   If an hour goes by and you don't feel 10 movements, have something to eat or drink. Count for another hour. If you don't feel at least 10 movements in the 2-hour period, call your doctor.   Follow-up care is a key part of your treatment and safety. Be sure to make and go to all appointments, and call your doctor if you are having problems. It's also a good idea to know your test results and keep a list of the medicines you take.  Where can you learn more?  Go to https://www.[employer].net/patiented  Enter X471 in the search box to learn more about \"Weeks 30 to 32 of Your Pregnancy: Care Instructions.\"  Current as of: July 11, 2023               Content Version: 13.8    2539-2343 [employer].   Care instructions adapted under license by your healthcare professional. If you have questions about a medical condition or this instruction, always ask your healthcare professional. [employer] disclaims any warranty or liability for your use of this information.      Learning About Birth Control After Childbirth  What is birth control?  Birth control is any method used to prevent pregnancy. If you have vaginal sex without birth control, you could get pregnant--even if you haven't started having periods again. You're less likely to get pregnant while breastfeeding, but it's still possible. Finding birth control that works for you can help avoid an unplanned pregnancy.  There are many kinds of birth control. Each has pros and cons. Find what works for you. Talk to your doctor if you've just given birth or are breastfeeding.    Long-acting reversible contraception (LARC). These are placed inside your body by a doctor. They can prevent pregnancy for years.  Examples include:  An implant (hormonal).  Copper intrauterine device (IUD).  Hormonal IUDs.   Short-acting hormonal methods. These release hormones. Examples include:  Combination birth control pills " "(\"the pill\").  Skin patches.  A vaginal ring.  A shot.  Mini-pills. Choose progestin-only options soon after giving birth.     Barrier methods. Use these every time you have vaginal sex.  Examples include:  External (male) condoms.  Internal (female) condoms.  Diaphragms.  Cervical caps.  Sponges.   Spermicides. These kill sperm or stop sperm from moving. They can be gels, creams, foams, films, or tablets. Use them before vaginal sex.  Examples include:  Nonoxynol-9.  pH regulator gel.     Permanent birth control (sterilization). This can be an option if you're sure that you don't want to get pregnant later.  Examples include:  Vasectomy.  Having tubes tied (tubal ligation).   Emergency contraception. This is a backup method. Use it if you didn't use birth control or your birth control method failed.  Examples include:  Copper and hormonal IUDs.  Emergency contraceptive pills.     Fertility awareness. You'll learn when you are most likely to become pregnant (are fertile). You can avoid vaginal sex at that time.  It's also called:  Natural family planning.  The rhythm method.   Breastfeeding. This is most effective when all of these are true:  Your baby is younger than 6 months old.  You're breastfeeding and not bottle-feeding at all.  You aren't having periods.   Follow-up care is a key part of your treatment and safety. Be sure to make and go to all appointments, and call your doctor if you are having problems. It's also a good idea to know your test results and keep a list of the medicines you take.  Where can you learn more?  Go to https://www.[employer].net/patiented  Enter X408 in the search box to learn more about \"Learning About Birth Control After Childbirth.\"  Current as of: July 11, 2023               Content Version: 13.8    6270-7951 "[employer].   Care instructions adapted under license by your healthcare professional. If you have questions about a medical condition or this instruction, " always ask your healthcare professional. [employer] Incorporated disclaims any warranty or liability for your use of this information.

## 2024-01-31 ENCOUNTER — HOSPITAL ENCOUNTER (OUTPATIENT)
Dept: CARDIOLOGY | Facility: CLINIC | Age: 24
Discharge: HOME | End: 2024-01-31
Attending: OBSTETRICS & GYNECOLOGY | Admitting: OBSTETRICS & GYNECOLOGY
Payer: COMMERCIAL

## 2024-01-31 ENCOUNTER — OFFICE VISIT (OUTPATIENT)
Dept: CARDIOLOGY | Facility: CLINIC | Age: 24
End: 2024-01-31
Payer: COMMERCIAL

## 2024-01-31 DIAGNOSIS — O35.BXX0 FETAL CARDIAC DISEASE AFFECTING PREGNANCY, SINGLE OR UNSPECIFIED FETUS: Primary | ICD-10-CM

## 2024-01-31 DIAGNOSIS — O35.9XX0 FETAL ABNORMALITY AFFECTING MANAGEMENT OF MOTHER, ANTEPARTUM, SINGLE OR UNSPECIFIED FETUS: ICD-10-CM

## 2024-01-31 PROCEDURE — 76827 ECHO EXAM OF FETAL HEART: CPT

## 2024-01-31 PROCEDURE — 99202 OFFICE O/P NEW SF 15 MIN: CPT | Mod: 25 | Performed by: PEDIATRICS

## 2024-01-31 PROCEDURE — 93325 DOPPLER ECHO COLOR FLOW MAPG: CPT | Mod: 26 | Performed by: PEDIATRICS

## 2024-01-31 PROCEDURE — 76825 ECHO EXAM OF FETAL HEART: CPT | Mod: 26 | Performed by: PEDIATRICS

## 2024-01-31 PROCEDURE — 76827 ECHO EXAM OF FETAL HEART: CPT | Mod: 26 | Performed by: PEDIATRICS

## 2024-01-31 PROCEDURE — 93325 DOPPLER ECHO COLOR FLOW MAPG: CPT

## 2024-01-31 NOTE — PROGRESS NOTES
[facility]   Heart Center Fetal Consult Note    Patient:  Indigo Bright MRN:  [id number]   YOB: 2000 Age:  23 year old   Date of Visit:  2024 PCP:  No Ref-Primary, Physician     Dear Doctor,     I had the pleasure of seeing Indigo Bright at the [facility] on 2024 in fetal cardiology consultation for fetal echocardiogram results. She presented today accompanied by her partner. As you know, she is a 23 year old  at 29w5d who presented for fetal echocardiogram today because of incomplete cardiac exam.    I performed and interpreted the fetal echocardiogram today, which demonstrated likely normal fetal echocardiogram. Normal fetal intracardiac connections. Normal right and left ventricular size and function. No hydrops.    I reviewed the echo findings today with Indigo Bright and her partner. Although this was a technically difficult study, the patient is aware that no obvious cardiac abnormalities were identified. She is aware of the general limitations of fetal echocardiography. No additional fetal echocardiograms are recommended. No  cardiac follow-up is required.     Thank you for allowing me to participate in Indigo's care. Please do not hesitate to contact me with questions or concerns.    This visit was separate from the performance and interpretation of the ultrasound. The majority of the time (>50%) was spent in counseling and coordination of care. I spent approximately 20 minutes in face-to-face time reviewing the above considerations.    Roxanne Baer M.D.  Pediatric Cardiology  [address], 5th floor, [facility] [address]  Phone [phone]  Fax [phone]

## 2024-02-07 ENCOUNTER — TELEPHONE (OUTPATIENT)
Dept: MIDWIFE SERVICES | Facility: CLINIC | Age: 24
End: 2024-02-07
Payer: COMMERCIAL

## 2024-02-08 ENCOUNTER — HOSPITAL ENCOUNTER (OUTPATIENT)
Facility: CLINIC | Age: 24
Discharge: HOME | End: 2024-02-08
Attending: ADVANCED PRACTICE MIDWIFE | Admitting: ADVANCED PRACTICE MIDWIFE
Payer: COMMERCIAL

## 2024-02-08 ENCOUNTER — HOSPITAL ENCOUNTER (OUTPATIENT)
Facility: CLINIC | Age: 24
End: 2024-02-08
Admitting: ADVANCED PRACTICE MIDWIFE
Payer: COMMERCIAL

## 2024-02-08 VITALS — TEMPERATURE: 98.6 F | SYSTOLIC BLOOD PRESSURE: 119 MMHG | DIASTOLIC BLOOD PRESSURE: 57 MMHG | RESPIRATION RATE: 18 BRPM

## 2024-02-08 PROBLEM — Z36.89 ENCOUNTER FOR TRIAGE IN PREGNANT PATIENT: Status: ACTIVE | Noted: 2024-02-08

## 2024-02-08 PROBLEM — R11.2 NAUSEA AND VOMITING: Status: ACTIVE | Noted: 2024-02-08

## 2024-02-08 PROBLEM — M54.50 ACUTE LEFT-SIDED LOW BACK PAIN WITHOUT SCIATICA: Status: ACTIVE | Noted: 2024-02-08

## 2024-02-08 LAB
ALBUMIN UR-MCNC: 20 MG/DL
APPEARANCE UR: CLEAR
BACTERIA #/AREA URNS HPF: ABNORMAL /HPF
BILIRUB UR QL STRIP: NEGATIVE
CLUE CELLS: ABNORMAL
COLOR UR AUTO: YELLOW
GLUCOSE UR STRIP-MCNC: NEGATIVE MG/DL
HGB UR QL STRIP: NEGATIVE
KETONES UR STRIP-MCNC: 60 MG/DL
LEUKOCYTE ESTERASE UR QL STRIP: ABNORMAL
MUCOUS THREADS #/AREA URNS LPF: PRESENT /LPF
NITRATE UR QL: NEGATIVE
PH UR STRIP: 6.5 [PH] (ref 5–7)
RBC URINE: 2 /HPF
SP GR UR STRIP: 1.02 (ref 1–1.03)
SQUAMOUS EPITHELIAL: 7 /HPF
TRICHOMONAS, WET PREP: ABNORMAL
UROBILINOGEN UR STRIP-MCNC: <2 MG/DL
WBC URINE: 7 /HPF
WBC'S/HIGH POWER FIELD, WET PREP: ABNORMAL
YEAST, WET PREP: ABNORMAL

## 2024-02-08 PROCEDURE — G0463 HOSPITAL OUTPT CLINIC VISIT: HCPCS

## 2024-02-08 PROCEDURE — 250N000011 HC RX IP 250 OP 636: Performed by: ADVANCED PRACTICE MIDWIFE

## 2024-02-08 PROCEDURE — 250N000013 HC RX MED GY IP 250 OP 250 PS 637: Performed by: ADVANCED PRACTICE MIDWIFE

## 2024-02-08 PROCEDURE — 87210 SMEAR WET MOUNT SALINE/INK: CPT | Performed by: ADVANCED PRACTICE MIDWIFE

## 2024-02-08 PROCEDURE — 99214 OFFICE O/P EST MOD 30 MIN: CPT | Performed by: ADVANCED PRACTICE MIDWIFE

## 2024-02-08 PROCEDURE — 258N000003 HC RX IP 258 OP 636: Performed by: ADVANCED PRACTICE MIDWIFE

## 2024-02-08 PROCEDURE — 87086 URINE CULTURE/COLONY COUNT: CPT | Performed by: ADVANCED PRACTICE MIDWIFE

## 2024-02-08 PROCEDURE — 81001 URINALYSIS AUTO W/SCOPE: CPT | Performed by: ADVANCED PRACTICE MIDWIFE

## 2024-02-08 RX ORDER — CYCLOBENZAPRINE HCL 10 MG
10 TABLET ORAL EVERY 8 HOURS PRN
Status: DISCONTINUED | OUTPATIENT
Start: 2024-02-08 | End: 2024-02-08 | Stop reason: HOSPADM

## 2024-02-08 RX ORDER — LIDOCAINE 40 MG/G
CREAM TOPICAL
Status: DISCONTINUED | OUTPATIENT
Start: 2024-02-08 | End: 2024-02-08 | Stop reason: HOSPADM

## 2024-02-08 RX ORDER — ACETAMINOPHEN 500 MG
500-1000 TABLET ORAL EVERY 6 HOURS PRN
COMMUNITY
End: 2024-02-21

## 2024-02-08 RX ORDER — HYDROXYZINE HYDROCHLORIDE 25 MG/1
50 TABLET, FILM COATED ORAL
Status: COMPLETED | OUTPATIENT
Start: 2024-02-08 | End: 2024-02-08

## 2024-02-08 RX ORDER — ACETAMINOPHEN 325 MG/1
650 TABLET ORAL EVERY 4 HOURS PRN
Status: DISCONTINUED | OUTPATIENT
Start: 2024-02-08 | End: 2024-02-08 | Stop reason: HOSPADM

## 2024-02-08 RX ORDER — ONDANSETRON 2 MG/ML
8 INJECTION INTRAMUSCULAR; INTRAVENOUS EVERY 8 HOURS PRN
Status: DISCONTINUED | OUTPATIENT
Start: 2024-02-08 | End: 2024-02-08 | Stop reason: HOSPADM

## 2024-02-08 RX ADMIN — SODIUM CHLORIDE, POTASSIUM CHLORIDE, SODIUM LACTATE AND CALCIUM CHLORIDE 1000 ML: 600; 310; 30; 20 INJECTION, SOLUTION INTRAVENOUS at 01:32

## 2024-02-08 RX ADMIN — ACETAMINOPHEN 650 MG: 325 TABLET ORAL at 02:32

## 2024-02-08 RX ADMIN — CYCLOBENZAPRINE 10 MG: 10 TABLET, FILM COATED ORAL at 01:14

## 2024-02-08 RX ADMIN — HYDROXYZINE HYDROCHLORIDE 50 MG: 25 TABLET, FILM COATED ORAL at 02:33

## 2024-02-08 RX ADMIN — CYCLOBENZAPRINE 10 MG: 10 TABLET, FILM COATED ORAL at 01:47

## 2024-02-08 RX ADMIN — ONDANSETRON 8 MG: 2 INJECTION INTRAMUSCULAR; INTRAVENOUS at 01:33

## 2024-02-08 ASSESSMENT — ACTIVITIES OF DAILY LIVING (ADL)
ADLS_ACUITY_SCORE: 18
ADLS_ACUITY_SCORE: 18

## 2024-02-08 NOTE — PROGRESS NOTES
Symptoms have improved some with Tylenol and vistaril.  Offered ED visit for ongoing discomfort, pt declines at this time.  Prefers to discharge to home.  Warning signs reviewed.  RTC within 2 weeks or prn.  ATUL Mcdonough, WALLACE

## 2024-02-08 NOTE — PROGRESS NOTES
Outpatient Note:    Patient Name:  Indigo Bright  :      2000  MRN:      [id number]      Assessment:   30w6d   L back pain.  Nausea and vomiting.  Cervix reassuring at this time, no evidence of  labor    Plan:  IVFB.  IV Zofran.  PO flexeril now.  PO vistaril prn.  UC ordered.  Continue to monitor.    Subjective:  Indigo Bright is a 23 year old  at 30w6d , with an EDC of 2024 who presented to [facility] for evaluation of back pain. Denies leaking of fluid, bleeding, or changes in fetal movement.  Does not feel like she is having contractions.  Reports they moved this week and she was more active than usual.  Wondering if she over did it.  Feels like the pain is muscular.  This evening she had onset of L back pain that occasionally radiates around to her abdomen.  Describes as constant cramping/stabbing.  Denies fever/chills.  Has also had several episodes of emesis this evening and upon arrival to the hospital.  Took Extra Strength Tylenol around 2100, vomited approximately 1 hour later.  Notes she had some dysuria that has been somewhat intermittent for the past 2 days.  Denies HA, visual disturbances, epigastric pain.  Denies changes in vaginal discharge, itching, burning, odor.  Tried warm bath at home, warm compresses, ice with no improvement in symptoms.    Prenatal record reviewed. Followed by [facility] with consistent AP care.  Noted risk factors:   Patient Active Problem List   Diagnosis    Morbid obesity (H)    Sleep disorder    Allergic rhinitis, unspecified seasonality, unspecified trigger    Primary narcolepsy without cataplexy    Supervision of high risk pregnancy in third trimester     .     Objective:  Vital signs:   /57   Temp 98.6  F (37  C) (Oral)   Resp 18   LMP 2023 (Exact Date)     Electronic Fetal Monitorinbpm. Moderate variability, no accels, no decels.  Within expected limits  for gestational age    Uterine contractions: None noted on monitor or palpated    Physical Exam: A&O.  Appears uncomfortable.  Wincing.  Difficulty moving around in bed.  Abdomen: SIUP, abdomen soft, non-tender. Negative CVA tenderness.  SVE: 0.5cm/50%/presenting part not palpable  Legs: Trace edema    Results:  Wet prep: WNL    UA: + ketones, +leukocyte esterase, few bacteria.  UC ordered.    Provider: ATUL Mcdonough, WALLACE    Time spent on exam/face to face time/care coordination and documentation: 30 minutes      Date:  2/8/2024  Time:  12:39 AM

## 2024-02-08 NOTE — PROGRESS NOTES
Slight improvement with Flexeril but now feels like back pain and nausea are returning.  PO Tylenol and vistaril ordered.  Discussed that if pain does not resolve would recommend going to ED for further evaluation as pt has been obstetrically cleared.  Will continue to monitor.  ATUL Mcdonough, CNM  Total face to face time: 10 minutes

## 2024-02-08 NOTE — PROGRESS NOTES
Data: Patient assessed in the [facility] for abdominal pain and back pain and nausea/vomiting . Cervix 0.5 cm dilated and 50% effaced. Fetal station -5. Membranes intact. Contractions are not present. See flowsheets for fetal assessment documentation.     Action: Presumed adequate fetal oxygenation documented. Discharge instructions reviewed. Patient instructed to report change in fetal movement, vaginal leaking of fluid or bleeding, abdominal pain, or any concerns related to the pregnancy to provider/clinic.      Response: Orders to discharge home per Suad Casas CNM. Patient verbalized understanding of education and agreement with plan. Discharged to home at 0319 via wheelchair.

## 2024-02-08 NOTE — PROGRESS NOTES
Data: Patient presented to [facility]: 2024 12:17 AM.  Reason for maternal/fetal assessment is abdominal pain, nausea and vomiting, and back pain . Patient reports that the back pain began first tonight. She says that it is low back, left side. Nontender to palpation. She rates pain 7/10 on pain scale and describes as constant and sharp. She reports that the vomiting started around 10pm and she is having upper abdominal pain. Pt has had several emesis since arrival. She also describes urinary urgency, frequency, and dysurea that happened on this past . Patient denies uterine contractions, leaking of vaginal fluid/rupture of membranes, vaginal bleeding, pelvic pressure, headache, visual disturbances, significant edema. Patient reports fetal movement is normal. Patient is a 30w6d .  Prenatal record reviewed. Pregnancy has been complicated by hypertension.    Vital signs  initial BP elevated, recheck WNL. Afebrile . Support person is present.     Action: Verbal consent for EFM. Triage assessment completed.     Response: Patient verbalized agreement with plan. Suad Casas CNM is here and has evaluated patient. Plan of care discussed with pt and she verbalized understanding.

## 2024-02-08 NOTE — PROGRESS NOTES
"Pt states that her abdominal pain has resolved and her back pain is improved. Pt states, \"I feel like the puking is pain related.\" Pt encouraged to be seen in the ED if needed. She says that she wants to \"go home and sleep it off.\"   EVY Casas CNM updated. Orders taken for discharge to home.   "

## 2024-02-08 NOTE — DISCHARGE INSTRUCTIONS
Labor: Care Instructions  Overview      labor is the start of labor between 20 and 36 weeks of pregnancy. Most babies are born at 37 to 42 weeks of pregnancy. In labor, the uterus contracts to open the cervix. This is the first stage of childbirth.  labor can be caused by a problem with the baby, the mother, or both. Often the cause is not known.  In some cases, doctors use medicines to try to delay labor until 34 or more weeks of pregnancy. By this time, a baby has grown enough so that problems are not likely. In some cases--such as with a serious infection--it is healthier for the baby to be born early. Your treatment will depend on how far along you are in your pregnancy and on your health and your baby's health.  Follow-up care is a key part of your treatment and safety. Be sure to make and go to all appointments, and call your doctor if you are having problems. It's also a good idea to know your test results and keep a list of the medicines you take.  How can you care for yourself at home?  If your doctor prescribed medicines, take them exactly as directed. Call your doctor if you think you are having a problem with your medicine.  Rest until your doctor advises you about activity.  Do not have sexual intercourse unless your doctor says it is safe.  Use sanitary pads if you have vaginal bleeding. Using pads makes it easier to monitor your bleeding.  Do not smoke or allow others to smoke around you. If you need help quitting, talk to your doctor about stop-smoking programs and medicines. These can increase your chances of quitting for good.  When should you call for help?   Call 911  anytime you think you may need emergency care. For example, call if:    You passed out (lost consciousness).     You have a seizure.     You have severe vaginal bleeding.     You have severe pain in your belly or pelvis that doesn't get better between contractions.     You have had fluid gushing or leaking from  "your vagina and you know or think the umbilical cord is bulging into your vagina. If this happens, immediately get down on your knees so your rear end (buttocks) is higher than your head. This will decrease the pressure on the cord until help arrives.   Call your doctor now or seek immediate medical care if:    You have signs of preeclampsia, such as:  Sudden swelling of your face, hands, or feet.  New vision problems (such as dimness, blurring, or seeing spots).  A severe headache.     You have any vaginal bleeding.     You have belly pain or cramping.     You have a fever.     You have had regular contractions (with or without pain) for an hour. This means that you have 6 or more within 1 hour after you change your position and drink fluids.     You have a sudden release of fluid from the vagina.     You have low back pain or pelvic pressure that does not go away.     You notice that your baby has stopped moving or is moving much less than normal.   Watch closely for changes in your health, and be sure to contact your doctor if you have any problems.  Where can you learn more?  Go to https://www.[employer].net/patiented  Enter Q400 in the search box to learn more about \" Labor: Care Instructions.\"  Current as of: 2023               Content Version: 13.8    1126-9815 [email or web address].   Care instructions adapted under license by your healthcare professional. If you have questions about a medical condition or this instruction, always ask your healthcare professional. [email or web address] disclaims any warranty or liability for your use of this information.      Learning About When to Call Your Doctor During Pregnancy (After 20 Weeks)  Overview  It's common to have concerns about what might be a problem when you're pregnant. Most pregnancies don't have any serious problems. But it's still important to know when to call your doctor if you have certain symptoms or signs of labor.  These are " general suggestions. Your doctor may give you some more information about when to call.  When to call your doctor (after 20 weeks)  Call 911  anytime you think you may need emergency care. For example, call if:  You have severe vaginal bleeding. This means you are soaking through a pad each hour for 2 or more hours.  You have sudden, severe pain in your belly.  You have chest pain, are short of breath, or cough up blood.  You passed out (lost consciousness).  You have a seizure.  You see or feel the umbilical cord.  You think you are about to deliver your baby and can't make it safely to the hospital or birthing center.  Call your doctor now or seek immediate medical care if:  You have vaginal bleeding.  You have belly pain.  You have a fever.  You are dizzy or lightheaded, or you feel like you may faint.  You have signs of a blood clot in your leg (called a deep vein thrombosis), such as:  Pain in the calf, back of the knee, thigh, or groin.  Swelling in your leg or groin.  A color change on the leg or groin. The skin may be reddish or purplish, depending on your usual skin color.  You have symptoms of preeclampsia, such as:  Sudden swelling of your face, hands, or feet.  New vision problems (such as dimness, blurring, or seeing spots).  A severe headache.  You have a sudden release of fluid from your vagina. (You think your water broke.)  You've been having regular contractions for an hour. This means that you've had at least 6 contractions within 1 hour, even after you change your position and drink fluids.  You notice that your baby has stopped moving or is moving less than normal.  You have signs of heart failure, such as:  New or increased shortness of breath.  New or worse swelling in your legs, ankles, or feet.  Sudden weight gain, such as more than 2 to 3 pounds in a day or 5 pounds in a week.  Feeling so tired or weak that you cannot do your usual activities.  You have symptoms of a urinary tract  "infection. These may include:  Pain or burning when you urinate.  A frequent need to urinate without being able to pass much urine.  Pain in the flank, which is just below the rib cage and above the waist on either side of the back.  Blood in your urine.  Watch closely for changes in your health, and be sure to contact your doctor if:  You have vaginal discharge that smells bad.  You feel sad, anxious, or hopeless for more than a few days.  You have skin changes, such as a rash, itching, or a yellow color to your skin.  You have other concerns about your pregnancy.  If you have labor signs at 37 weeks or more  If you have signs of labor at 37 weeks or more, your doctor may tell you to call when your labor becomes more active. Symptoms of active labor include:  Contractions that are regular.  Contractions that are less than 5 minutes apart.  Contractions that are hard to talk through.  Follow-up care is a key part of your treatment and safety. Be sure to make and go to all appointments, and call your doctor if you are having problems. It's also a good idea to know your test results and keep a list of the medicines you take.  Where can you learn more?  Go to https://www.[employer].net/patiented  Enter N531 in the search box to learn more about \"Learning About When to Call Your Doctor During Pregnancy (After 20 Weeks).\"  Current as of: July 11, 2023               Content Version: 13.8    9841-8470 [employer].   Care instructions adapted under license by your healthcare professional. If you have questions about a medical condition or this instruction, always ask your healthcare professional. [employer] disclaims any warranty or liability for your use of this information.      "

## 2024-02-08 NOTE — TELEPHONE ENCOUNTER
Phone Call:    Indigo Bright, is a 23 year old,  at 30w5d, calls reporting left sided low back pain that wraps around to the front of her abdomen. The pain started about 2 hours ago. It is constant in nature. Unsure if she pulled a muscle as they were moving over the past few days and she has been very active. Something similar happened a few weeks ago and after wearing her maternity support belt, the pain resolved on its own. She did take 1000mg of Tylenol about 15 mn ago. Wondering what she can do to resolve sx. Denies fever, nausea/vomiting, diarrhea, dysuria, frequency, pelvic pressure, contractions, leaking, bleeding or changes in fetal movements. .Encouraged rest, rice sock or heat pad on low to back, warm bath with or without epsom salt, stretching, and continued monitoring of sx. Reviewed when to call back including with worsening sx or other warning signs as reviewed. All questions answered, agrees with plan.     ATUL Simmons, CNM  [facility]  Midwifery

## 2024-02-11 LAB
BACTERIA UR CULT: ABNORMAL
BACTERIA UR CULT: ABNORMAL

## 2024-02-15 PROBLEM — R82.71 GBS BACTERIURIA: Status: ACTIVE | Noted: 2024-02-15

## 2024-02-17 ENCOUNTER — NURSE TRIAGE (OUTPATIENT)
Dept: NURSING | Facility: CLINIC | Age: 24
End: 2024-02-17
Payer: COMMERCIAL

## 2024-02-17 ENCOUNTER — MYC MEDICAL ADVICE (OUTPATIENT)
Dept: MIDWIFE SERVICES | Facility: CLINIC | Age: 24
End: 2024-02-17
Payer: COMMERCIAL

## 2024-02-18 NOTE — TELEPHONE ENCOUNTER
"TC:    Page from FNA received at 2051 re: \"abd pain, spotting, urine is pinkish red\".  Indigo called from [id number].  Reviewed hx from 2/8/24 when she was seen in triage- see chart for notes.  Reports pink urine x 24h.  Unsure if vaginal spotting- scant pink with wiping noted 2/8, 2/14, and today 2/17, but believes is pink in urine vs vaginal source.  Has not needed to use panty liner or change underwear.  States overall her pain has improved since 2/8. Pain is intermittent.  Worse with standing/work.  Better with heat/ice/rest. Located at times in her back- lower left side \"towards spine\", and at other times in her abdomen- also lower left side \"pelvic to middle\".   UC done 2/8 resulted <10K GBS.  No dysuria/incomplete empting.  No fever.  Does have urgency.  Reports excellent fetal movement.    Reviewed possible causes such as kidney stone/worsening UTI/vaginal microbiome imbalance; as well as options for evaluation such as triage for repeat UC, wet prep, and ultrasound to check for kidney stone(s).  Prefers to wait for appointment this coming Wednesday with Midwife Pehl if possible.  Reviewed warning s/s:  - worsening pain particularly if unable to sleep through, fever, bloody show or obvious vaginal bleeding, menstrual-like cramping, time-able back pain, pelvic pressure, pain with urination or feelings of inability to fully empty bladder.  -encouraged to call back if any present    Encouraged to push fluids tomorrow during the day.      Follow up 2/21/24 with Midwife Pehl, or sooner as indicated/desired.    Indigo verbalized understanding and agreement with all of the above.    ATUL Lancaster CNM  2/17/2024  9:33 PM   "

## 2024-02-18 NOTE — TELEPHONE ENCOUNTER
"Serge. Pregnant 32w 1 d Midwives 1st baby  Admitted for back and abdominal pain . Urine sample was obtained. Midwife messaged her a couple of days ago, discussed possibility of antibiotics, but they weren't ordered yet: to be discussed next appointment. Since Weds abdominal pain and spotting. Her urine is pinkish red today. Currently abdominal pain =\"3-4\". The last couple of days =\"7+1/2\". Worse last night.   Her next appointment is  with WALLACE. Patient would like to speak to oncall CNM tonight.   Phuc GONSALEZ= Anika Tracey, paged via am com @ 8:51pm. Answering service contacted @ 9:10pm   9:18pm W[facility] returned call, has contacted patient and instructions were given.     Ghazal Yates RN Triage Nurse Advisor 9:19 PM 2024  OB Triage Call    Is patient's OB/Midwife with the [facility] or [facility]?   [facility]   Is patient's delivering hospital on divert? No      32w1d    Estimated Date of Delivery: 2024        OB History    Para Term  AB Living   1 0 0 0 0 0   SAB IAB Ectopic Multiple Live Births   0 0 0 0 0      # Outcome Date GA Lbr Delvis/2nd Weight Sex Delivery Anes PTL Lv   1 Current                No results found for: \"GBS\"       Maxine Yates RN 24 9:13 PM  [facility] Nurse Advisor       Reason for Disposition   MODERATE-SEVERE abdominal pain (e.g., interferes with normal activities, awakens from sleep)   Vaginal bleeding or spotting    Additional Information   Negative: Passed out (i.e., lost consciousness, collapsed and was not responding)   Negative: Shock suspected (e.g., cold/pale/clammy skin, too weak to stand, low BP, rapid pulse)   Negative: Difficult to awaken or acting confused (e.g., disoriented, slurred speech)   Negative: [1] SEVERE abdominal pain (e.g., excruciating) AND [2] constant AND [3] present > 1 hour   Negative: SEVERE vaginal bleeding (e.g., continuous red blood from vagina, large blood clots)   Negative: Sounds like a life-threatening " "emergency to the triager   Negative: Followed an abdomen (stomach) injury   Negative: [1] Having contractions or other symptoms of labor (such as vaginal pressure) AND [2] 37 or more weeks pregnant (i.e., term pregnancy)   Negative: [1] Having contractions or other symptoms of labor (such as vaginal pressure) AND [2] < 37 weeks pregnant (i.e., )   Negative: [1] Abdominal pain AND [2] pregnant < 20 weeks   Negative: [1] Vomiting AND [2] contains red blood or black (\"coffee ground\") material  (Exception: Few red streaks in vomit that only happened once.)   Negative: [1] SEVERE headache AND [2] not relieved with acetaminophen (e.g., Tylenol)    Protocols used: Pregnancy - Abdominal Pain Greater Than 20 Weeks EGA-A-AH    "

## 2024-02-19 NOTE — TELEPHONE ENCOUNTER
CNM attempted to contact patient to gather more information about how she is doing.  Left voicemails at [phone] and [phone]. Will send a [employer] message to check in now.

## 2024-02-21 ENCOUNTER — PRENATAL OFFICE VISIT (OUTPATIENT)
Dept: MIDWIFE SERVICES | Facility: CLINIC | Age: 24
End: 2024-02-21
Payer: COMMERCIAL

## 2024-02-21 ENCOUNTER — OFFICE VISIT (OUTPATIENT)
Dept: MATERNAL FETAL MEDICINE | Facility: HOSPITAL | Age: 24
End: 2024-02-21
Attending: OBSTETRICS & GYNECOLOGY
Payer: COMMERCIAL

## 2024-02-21 ENCOUNTER — ANCILLARY PROCEDURE (OUTPATIENT)
Dept: ULTRASOUND IMAGING | Facility: HOSPITAL | Age: 24
End: 2024-02-21
Attending: OBSTETRICS & GYNECOLOGY
Payer: COMMERCIAL

## 2024-02-21 VITALS
BODY MASS INDEX: 43.92 KG/M2 | WEIGHT: 244 LBS | DIASTOLIC BLOOD PRESSURE: 72 MMHG | SYSTOLIC BLOOD PRESSURE: 122 MMHG | HEART RATE: 88 BPM

## 2024-02-21 DIAGNOSIS — O99.210 OBESITY IN PREGNANCY, ANTEPARTUM: ICD-10-CM

## 2024-02-21 DIAGNOSIS — O99.213 MATERNAL OBESITY SYNDROME IN THIRD TRIMESTER: ICD-10-CM

## 2024-02-21 DIAGNOSIS — O09.93 SUPERVISION OF HIGH RISK PREGNANCY IN THIRD TRIMESTER: ICD-10-CM

## 2024-02-21 DIAGNOSIS — O99.213 OBESITY AFFECTING PREGNANCY IN THIRD TRIMESTER, UNSPECIFIED OBESITY TYPE: Primary | ICD-10-CM

## 2024-02-21 DIAGNOSIS — O35.9XX0 FETAL ABNORMALITY AFFECTING MANAGEMENT OF MOTHER, ANTEPARTUM, SINGLE OR UNSPECIFIED FETUS: ICD-10-CM

## 2024-02-21 DIAGNOSIS — R30.0 DYSURIA: Primary | ICD-10-CM

## 2024-02-21 PROBLEM — Z36.89 ENCOUNTER FOR TRIAGE IN PREGNANT PATIENT: Status: RESOLVED | Noted: 2024-02-08 | Resolved: 2024-02-21

## 2024-02-21 LAB
ALBUMIN UR-MCNC: NEGATIVE MG/DL
APPEARANCE UR: CLEAR
BILIRUB UR QL STRIP: NEGATIVE
COLOR UR AUTO: YELLOW
GLUCOSE UR STRIP-MCNC: NEGATIVE MG/DL
HGB UR QL STRIP: NEGATIVE
KETONES UR STRIP-MCNC: NEGATIVE MG/DL
LEUKOCYTE ESTERASE UR QL STRIP: NEGATIVE
NITRATE UR QL: NEGATIVE
PH UR STRIP: 7 [PH] (ref 5–8)
SP GR UR STRIP: 1.01 (ref 1–1.03)
UROBILINOGEN UR STRIP-ACNC: 0.2 E.U./DL

## 2024-02-21 PROCEDURE — 76816 OB US FOLLOW-UP PER FETUS: CPT

## 2024-02-21 PROCEDURE — 99207 PR NO CHARGE LOS: CPT | Performed by: OBSTETRICS & GYNECOLOGY

## 2024-02-21 PROCEDURE — 76816 OB US FOLLOW-UP PER FETUS: CPT | Mod: 26 | Performed by: OBSTETRICS & GYNECOLOGY

## 2024-02-21 PROCEDURE — 81003 URINALYSIS AUTO W/O SCOPE: CPT | Performed by: ADVANCED PRACTICE MIDWIFE

## 2024-02-21 PROCEDURE — 99207 PR PRENATAL VISIT: CPT | Performed by: ADVANCED PRACTICE MIDWIFE

## 2024-02-21 PROCEDURE — 87086 URINE CULTURE/COLONY COUNT: CPT | Performed by: ADVANCED PRACTICE MIDWIFE

## 2024-02-21 PROCEDURE — 87088 URINE BACTERIA CULTURE: CPT | Performed by: ADVANCED PRACTICE MIDWIFE

## 2024-02-21 NOTE — NURSING NOTE
Pt at [facility] for ultrasound- see detailed report under imaging tab.  Pt reports positive fetal movement, denies contractions, loss of fluid and vaginal bleeding.  States some concerns recently for kidney stones d/t back pain and blood in urine but has improved with increased hydration, also has follow up midwife appt later today.  SBAR given to MD.

## 2024-02-21 NOTE — PROGRESS NOTES
"Feeling much better compared to hospital visit.  Back pain has resolved  but now feeling some dysuria--feels \"scratchy/pokey\" discomfort with urination.  Better with increased hydration.  Denies fever/chills.  Eating and drinking normally.  Discussed +GBS (low colony count) in urine.  Repeat UA/UC today.  Given supplies to strain urine to check for stone.  Consider ultrasound of kidneys if back pain returns.  Active baby.  Denies VB/LOF.  Denies contractions.  Had growth US with [facility].  Prefers BPP to be done at  for convenience.  Order placed for weekly BPP to start at 34 weeks. Needs repeat growth at 36 weeks--undecided whether she will do this at [facility] or .  Getting ready for baby at home.  Still deciding on Peds.  Will check on breast pump coverage.  "

## 2024-02-21 NOTE — PATIENT INSTRUCTIONS
"\"We hope you had a positive experience and that you can definitely recommend [facility] to your family and friends. You ll be receiving a survey soon and we look forward to hearing your feedback\".    [facility]  - Contact information:  Appointment line and to get a hold of CNM in clinic Monday-Friday 8 am - 5 pm:  [phone].  There are some clinics with early start times (1st appointment 7:40 am) and others with evening hours (last appointment 6:20 pm).  Most are typically open from 8 am to 5 pm.    CNM on call answering service: [phone].  Specify your hospital of choice and leave a brief message for CNM;  will then page CNM who is on call at your specified hospital and you should receive a call back with 15 minutes.  Be sure that your ringer is audible and that you can accept blocked calls so that we can get back in touch with you! This number should be reserved for urgent needs if during the day, before 8 am, after 5 pm, weekends, holidays.    Contact the on-call CNM with warning signs, such as:  vaginal bleeding   Vaginal discharge and itching or pain and burning during urination  Leg/calf pain or swelling on one side  severe abdominal pain  nausea and vomiting more than 4-5 times a day, or if you are unable to keep anything down  fever more than 100.4 degrees F.     BigTreehart  After each of your visits you are welcome to check [employer] for your visit summary including education and links to information relevant to your pregnancy and/or well woman care.   Find the \"Visits\" tab at the top of the page, you will see a list of recent visits and for each visit a for link for \"View After Visit Summary.\" View of your After Visit Summary will allow you to read our recommendations from your visit, review any education provided, and link to websites with useful information.   If you have any questions or difficulty navigating [employer], please feel free to " "contact us and we will do our best to direct you.  Meet the Midwives from [facility]  You are invited to an informational meet and greet with [facility]'s [facility] Certified Nurse-Midwives. Our free \"Meet the Midwives\" event is a great opportunity to learn about our midwives' philosophy and experience, the hospitals where we can assist with your birth, and answer questions you may have. Partners, friends, and family are welcome to attend. Currently, this is a virtual event.  Date  Last Thursday of every month at 7 pm.    Link to next (live) meeting  [email or web address]  To Join by Telephone (audio only) Call:   [phone] Phone Conference ID: [phone] #      Touring the Maternity Care Center  At this time we are offering a virtual tour of the Maternity Care Centers at both [facility] and [facility]:   [facility] Maternity Care:   https://[facility].CompareAway/locations/the-birthplace-at--[facility] Maternity Care:   [email or web address]://[employer]es[employer].CompareAway/locations/the-[facility]    Scroll to the bottom of the page in this link if the above link does not work.      Breastfeeding and Birth Control  How do I decide what birth control method is best for me while I am breastfeeding?  Choosing a method of birth control is very personal. First, answer the following questions:     Do you want to have more children?   How much spacing between births do you want for your children?   Do you smoke or have you had any health problems, such as liver disease or a blood clot?  Talk about the answers to each of these questions with your health care provider to help you choose the best method for you.    Can I use breastfeeding as my birth control?  Using breastfeeding as your birth control (the lactational amenorrhea method) can be a good way to keep from getting pregnant in the first " months after the baby is born. Each time your baby nurses, your body releases a hormone called prolactin, which stops your body from making the hormones that cause you to ovulate (release an egg). If you are not ovulating, you cannot get pregnant.    The lactational amenorrhea method works only if:   you have not started your period yet.   you are breastfeeding only and not giving your baby any other food or drink.   you are breastfeeding at least every 4 hours during the day and every 6 hours at night.   your baby is less than 6 months old.  When any 1 of these 4 things is not happening, you no longer have good protection from getting pregnant, and you should use another form of birth control.    What birth control methods are safe for me to use while I breastfeed?    Methods without hormones  Methods without hormones do not affect you, your baby, or your breastfeeding.  Methods without hormones that are the most effective   The copper intrauterine contraceptive device (IUD) (ParaGard) is a small, T-shaped device that is in- serted into your uterus (womb) through the vagina and cervix. The copper IUD lasts for 10 years.   Sterilization (getting your tubes tied or your partner having a vasectomy) is very effective, but it is per- manent. You should choose sterilization only if you do not want to have more children.  A method without hormones that is effective   The lactational amenorrhea method described above is effective for the first 6 months.  Methods without hormones that are less effective   Natural family planning is monitoring your body for signs of ovulation and not having sex when you think you are ovulating. This method is reliable only if you are having regular periods every month.   Barrier methods (condoms, diaphragms, sponges, and spermicides) are used at the time you have sex. These methods are effective only if you use them correctly every time.    Methods with hormones  Birth control methods that  use hormones can be used while you are breastfeeding. They may have a small effect on lowering the amount of milk you make. All hormones will get into your breast milk in very small amounts, but there is no known harm to your baby from this small amount of hormone in breast milk.only methodsmethods use only 1 hormone, called progestin. You can start them right after your baby is born or wait 4 to 6 weeks to make sure your milk supply is good.   Progestin-only pills ( minipills ): If you like to take pills every day, you can use the minipill. In order forpill to work well, you have to take 1 at the same time each day. When you stop breastfeeding, you should start pills that have both estrogen and progestin because they are better at keeping you from get- ting pregnant.   Progestin IUD (Mirena): The progestin IUD is shaped and inserted into the uterus like the copper IUD. It works for up to 5 years. Both IUDs are usually inserted 4 to 6 weeks after the baby is born.  Progestin implant (Nexplanon): The progestin implant is a small matchstick-sized flexible taylor. It is placed into the fatty tissue in the back of your arm. It works for up to 3 years.  Progestin shot (Depo-Provera): The progestin shot is given every 3 months.    estrogen and progestin methods  These methods use 2 hormones, called estrogen and progestin.     These methods increase your risk of a blood clot, which is already higher than normal after you have a baby. You should not use them until your baby is at least 6 weeks old. The combined methods are not recommended as the first choice for women who are breastfeeding. If a combined method is the one that you feel will be best for you to prevent getting pregnant, these methods are okay to use while breastfeeding.   Combined birth control pills: You take a pill each day.  Vaginal ring (NuvaRing): The ring is worn in the vagina for 3 weeks then left out for 1 week before youin a new ring.  Patch (Ortho  Markel): The patch is placed on your skin and changed every week for 3 weeks then left off forweek before putting a new patch on a different area of your skin.    Pediatric Care Providers at [facility]:    Choosing the right provider is one of the most important decisions you ll make about your health care. We can help you find the right one. Remember, you re looking for a provider you can trust and work with to improve your health and well-being, so take time to think about what you need. Depending on how complicated your health care needs are, you may need to see more than one type of provider.    Primary Care Providers: You ll see a primary care provider first for most health issues. They ll work with you to get your recommended screenings, help you manage chronic conditions, and refer you to other types of providers if you need them. Your primary care provider may be called a family physician or doctor, internist, general practitioner, nurse practitioner, or physician s assistant. Your child or teenager s provider may be called a pediatrician.  Specialists: You ll see a specialist for certain services or to treat specific conditions. Specialists include cardiologists, oncologists, psychologists, allergists, podiatrists, and orthopedists. You may need a referral from your primary care provider before you go to a specialist in order for your health plan to pay for your visit.\Usmanere are some tips for finding a provider where you live:  If you already have a provider you like and want to keep working with, call their office and ask if they accept your coverage.  Call your insurance company or state Medicaid and CHIP program. Look at their website or check your member handbook to find providers in your network who take your health coverage.  Ask your friends or family if they have providers they like and use these tools to compare health care providers in your area.    Family Medicine at  [facility]:      https://www.[email or web address].org/specialties/family-medicine    Many of our families enjoy all seeing the same doctor, who comes to know the whole family very well. We base our practice on the knowledge of the patient in the context of family and community.  WHY CHOOSE A FAMILY MEDICINE PHYSICIAN?  Ability of the whole family to see the same doctor  Focus on the whole person, including physical and emotional health  A personal relationship with their doctor that is nurtured over time  Respect for individual and family beliefs and values  No need to change primary care providers when a certain age is reached  Coordination of care when other health care services are needed    Pediatrics at  [facility]:   https://[email or web address].[email or web address].org/specialties/pediatric-care    Through a teaching affiliation with the [facility], [facility] staff keeps current on new developments in the field of pediatrics.     Everything we do centers around caring for children. We place special emphasis on wellness and prevention.pediatric care team includes a team of pediatricians and certified nurse practitioners who provide care to pediatric and adolescent patients ages 0 to 18, and some up to the age of 26. We offer preventive health maintenance for healthy children as well the diagnosis and treatment of common and chronic illnesses and injuries. In addition, we also offer several pediatric specialists who focus on adolescent health issues and developmental and behavioral issues.    Circumcision    Educational video:     https://[email or web address][email or web address]    For More Information  American Academy of Family Physicians: Circumcision  http://familydoctor.org/familydoctor/en/pregnancy-newborns/caring-for-newborns/infant- care/circumcision.html  MedlinePlus: Circumcision (includes a slide show on the procedure)  www.nlm.nih.gov/medlineplus/circumcision.html  American Academy of Pediatrics:  Policy statement on circumcision  Http://pediatrics.aappublications.org/content/130/3/e756.abstract      Childbirth and Parenting Education:     Everyday Miracles:   https://www.everyday-miracles.org/    Free Video Series from [facility] for [facility]: https://nursing.[facility]/academics/specialty-areas/nurse-midwifery/having-baby-prenatal-videos/having-baby-prenatal-and    Childbirth Education virtual (live) classes: www.[place]/classes  The Birth Hour: https://InVision/online-childbirth-class/  BirthED: https://www.birthedmn.com/  KARL parenting center: http://ArthroCAD/   ([phone] 268-XBXW  Blooma: (education, yoga & wellness) www.Catalyze  Enlightened Mama: www.enlightenedmama.Vinylmint   Childbirth collective: (Parent topic nights)  www.childbirthcollective.org/  Hypnobabies:  www.hypnobabiStylus Media.Vinylmint/  Hypnobirthing:  Http://[employer].Vinylmint/  Hypnobirthing virtual class: www.[employer]/hypnobirthing    Information about doulas:  Childbirth collective: http://www.childbirthcollective.org/  Doulas of [place] (LORNA):  www.lorna.org  [facility]  project: http://twincitiesdoulaproject.com/     Early Childhood and Family Education (ECFE):  ECFE offers parents hands-on learning experiences that will nourish a lifetime of teachable moments.  http://ecfe.info/ecfe-home/    APPS and Podcasts:   Andreia Cesar Nurture    Evidence Based Birth  The Birth Hour (for birth stories)   Birthful   Expectful   The Longest Shortest Time  PregnancyPodcquinn Santana    Book Recommendations:   Batsheva Rufus's Birthing From Within--first few chapters include a new-age tone, you may prefer to skip it and keep going, because there is good stuff later.  This book recommendation covers emotional preparation, but does cover coping with pain, and use of both pharmacological and nonpharmacological methods.    Guide to Childbirth by Bentonia May Crystal  Childbirth Without Fear by Mateo Banegas  "Read    Dr. Salinas' The Pregnancy Book and The Birth Book--the pregnancy book goes month-by month    The Birth Partner by Tressa High    Womanly Art of Breastfeeding by La Leche League International   Bestfeeding by Rena Loja--great pictures    Mothering Your Nursing Toddler, by Susannah Blanco.   Addresses dealing with so many of the challenging behaviors of a nursing toddler.  How Weaning Happens, by La Leche League.  Discusses weaning at all ages, from medically necessary weaning of an infant, all the way up to age 5 (or older), with why/why not, and strategies.  Very empowering book both for deciding to wean and deciding not to.    American College of Nurse-Midwives (ACNM) http://www.midwife.org/; look at the informational handouts at http://www.midwife.org/Share-With-Women     www.mymidwife.org    Mother to Baby (Medication and Herbal guidance in pregnancy): http://www.mothertobaby.org  Toll-Free Hotline: 871.349.6592  LactMed (Medication use while breastfeeding): http://toxnet.nlm.nih.gov/newtoxnet/lactmed.htm    Women's Health.gov:  http://www.womenshealth.gov/a-z-topics/index.html    American pregnancy association - http://americanpregnancy.org    Centering Pregnancy (group prenatal care option): http://centeringhealthcare.org    March of Dimes www.Liztic.com     FDA - Nutrition  www.mypyramid.gov  Under \"For Consumers,\" click on \"pregnant and breastfeeding women.\"      Centers for Disease Control and Prevention (CDC) - Vaccines : http://www.cdc.gov/vaccines/       When researching information on the web, question the validity of websites.  The domains .gov, .edu and.org tend to be more reliable information.  If there are a lot of advertisements, be cautious of the information provided. Stay away from blogs and chat rooms please!     Virtual Breastfeeding Support:    During this time of isolation, breastfeeding families need even more community!  Here are some area organizations offering " "virtual support groups for breastfeeding:    [facility] Cafe Support Group,  at 10:30 am   Run by DANTE Ray of [employer]   Go to [employer] Facebook page and click on \"events\" for link   https://www.Paradigm.[email or web address]/  [facility] Milk Hour,  at 2:30 pm    Run by DANTE Wills   Go to [facility] FB page and send message to get link   https://www.Paradigm.com/[facility] Centerfoundations/  [facility] holding virtual meetings the first Tuesday of each month, 8-9 pm, and the   Third Saturday, 10 - 11 am.  Go to [facility] FB page; message to get link [email or web address]/LLLofGoldenCarlos Enrique/?hc_location=[facility] offers a Lactation Lounge every Friday 12pm - 1pm, run by Nathalia Fontanez [facility] Leader   Sign up via link at https://www.FK [employer]otecnologia/cbe-lactation  [facility] is offering virtual support groups every Monday, 10:30 am - 12 pm, run by nurse IBCLC   [email or web address]/    Prenatal Breastfeeding Classes:      [facility] is offering virtual breastfeeding and  care classes:  https://www.FK Biotecnologia/education-workshops  BirthEd childbirth and breastfeeding education offering virtual prenatal breastfeeding classes  https://www.birthedmn.com/workshops    Breastfeeding clinic visits are at [facility] on , [facility] [place] on  and [facility] on . Call to schedule, [phone].    New Parent Connection:   Judy Aashish, [address] [facility]  In-person meetings on  from 6 pm - 7:15 pm for parents of  to 9 months, at the same site.   All are free, drop-in, no registration required.    There are also free virtual meetings ongoing on :  11:30 am - 12:30 pm for parents of " newborns to 3 months  4:15 pm to 5:15 pm for parents of 3 to 9-month olds  For joining info parents should call Maria G Flores at [phone]

## 2024-02-21 NOTE — PROGRESS NOTES
Please see full imaging report from ViewPoint program under imaging tab.    Thank-you for referring your patient for ultrasound assessment.    I discussed the findings on today's ultrasound with the patient. I reviewed the limitations of ultrasound.     Further ultrasound studies should include serial growth (again in 4 weeks) as well as weekly BPP at 34 weeks. She is tentatively scheduled for BPPs weekly here with MFM in [place], but will ask her midwifery team in [place] if she might do the weekly BPP with them and just the growth US with [facility].     Return to primary provider for continued prenatal care.    If you have questions regarding today's evaluation or if we can be of further service, please contact the Maternal-Fetal Medicine Center.    **Fetal anomalies may be present but not detected**    Rj Saini MD  Maternal Fetal Medicine

## 2024-02-24 LAB
BACTERIA UR CULT: ABNORMAL
BACTERIA UR CULT: ABNORMAL

## 2024-02-25 ENCOUNTER — DOCUMENTATION ONLY (OUTPATIENT)
Dept: MIDWIFE SERVICES | Facility: CLINIC | Age: 24
End: 2024-02-25
Payer: COMMERCIAL

## 2024-02-26 DIAGNOSIS — E66.01 MORBID OBESITY (H): Primary | ICD-10-CM

## 2024-03-06 ENCOUNTER — PRENATAL OFFICE VISIT (OUTPATIENT)
Dept: MIDWIFE SERVICES | Facility: CLINIC | Age: 24
End: 2024-03-06
Payer: COMMERCIAL

## 2024-03-06 ENCOUNTER — HOSPITAL ENCOUNTER (OUTPATIENT)
Dept: ULTRASOUND IMAGING | Facility: CLINIC | Age: 24
Discharge: HOME | End: 2024-03-06
Attending: ADVANCED PRACTICE MIDWIFE | Admitting: ADVANCED PRACTICE MIDWIFE
Payer: COMMERCIAL

## 2024-03-06 VITALS
BODY MASS INDEX: 44.1 KG/M2 | WEIGHT: 245 LBS | HEART RATE: 92 BPM | DIASTOLIC BLOOD PRESSURE: 76 MMHG | SYSTOLIC BLOOD PRESSURE: 128 MMHG

## 2024-03-06 DIAGNOSIS — R82.71 GBS BACTERIURIA: ICD-10-CM

## 2024-03-06 DIAGNOSIS — O99.213 MATERNAL OBESITY SYNDROME IN THIRD TRIMESTER: ICD-10-CM

## 2024-03-06 DIAGNOSIS — O09.93 SUPERVISION OF HIGH RISK PREGNANCY IN THIRD TRIMESTER: Primary | ICD-10-CM

## 2024-03-06 PROCEDURE — 76819 FETAL BIOPHYS PROFIL W/O NST: CPT

## 2024-03-06 PROCEDURE — 99207 PR PRENATAL VISIT: CPT | Performed by: MIDWIFE

## 2024-03-06 NOTE — PROGRESS NOTES
Indigo is here by herself for her routine prenatal appt at 34w5d gestation. Reviewed +GBS in urine, plan for antibiotics in labor. Had BPP this morning 8/8, vertex. Feeling baby move, denies contractions or leaking of fluid. Reviewed recommendation for daily iron supplementation for all pregnant women taking a few times a week. Reviewed how to reach CNM with non-urgent and urgent concerns between visits. Advised to make appt in 1 weeks. EPDS=7, 0 to #10

## 2024-03-06 NOTE — PATIENT INSTRUCTIONS
"\"We hope you had a positive experience and that you can definitely recommend [facility] to your family and friends. You ll be receiving a survey soon and we look forward to hearing your feedback\".    [facility]  - Contact information:  Appointment line and to get a hold of CNM in clinic Monday-Friday 8 am - 5 pm:  [phone].  There are some clinics with early start times (1st appointment 7:40 am) and others with evening hours (last appointment 6:20 pm).  Most are typically open from 8 am to 5 pm.    CNM on call answering service: [phone].  Specify your hospital of choice and leave a brief message for CNM;  will then page CNM who is on call at your specified hospital and you should receive a call back with 15 minutes.  Be sure that your ringer is audible and that you can accept blocked calls so that we can get back in touch with you! This number should be reserved for urgent needs if during the day, before 8 am, after 5 pm, weekends, holidays.    Contact the on-call CNM with warning signs, such as:  vaginal bleeding   Vaginal discharge and itching or pain and burning during urination  Leg/calf pain or swelling on one side  severe abdominal pain  nausea and vomiting more than 4-5 times a day, or if you are unable to keep anything down  fever more than 100.4 degrees F.     Smart[employer]hart  After each of your visits you are welcome to check "[employer] for your visit summary including education and links to information relevant to your pregnancy and/or well woman care.   Find the \"Visits\" tab at the top of the page, you will see a list of recent visits and for each visit a for link for \"View After Visit Summary.\" View of your After Visit Summary will allow you to read our recommendations from your visit, review any education provided, and link to websites with useful information.   If you have any questions or difficulty navigating IMT ([employer]), please feel free to " "contact us and we will do our best to direct you.  Meet the Midwives from [facility]  You are invited to an informational meet and greet with [facility]'s [facility] Certified Nurse-Midwives. Our free \"Meet the Midwives\" event is a great opportunity to learn about our midwives' philosophy and experience, the hospitals where we can assist with your birth, and answer questions you may have. Partners, friends, and family are welcome to attend. Currently, this is a virtual event.  Date  Last Thursday of every month at 7 pm.    Link to next (live) meeting  [email or web address]  To Join by Telephone (audio only) Call:   [phone] Phone Conference ID: [phone] #    Touring the Maternity Care Center  At this time we are offering a virtual tour of the Maternity Care Centers at both [facility] and [facility]:   [facility] Maternity Care:   [email or web address] Hospital[facility] Technology.[employer]/locations/the-birthplace-at--[facility] Maternity Care:   [email or web address]://Globe I[employer]ier [employer]/locations/the-[facility] [place] [facility]    Scroll to the bottom of this hyperlink if the above link does not work      Postpartum Depression  The first weeks of caring for a  baby are more than a full-time job. Although it is often a happy time, your feelings and moods may not be what you expected. Many women experience  baby blues.  Here are some tips to help you understand when feelings of sadness are normal, and when you should call your health care provider.    What are the baby blues?  As many as 3 of every 4 women will have short periods of mood swings, crying, or feeling cranky or restless during the first weeks after birth. These feelings can be worse when you are tired or anxious. Women who have the baby blues often say they feel like crying but don t know why. Baby blues usually happen in the first or second week " postpartum (after you give birth) and last less than a week. If you are not sleeping, becoming more upset, don t feel like you can take care of your baby, or your sadness lasts 2 weeks or more, call your health care provider.    What is postpartum depression?  About one in every 5 women will develop depression during the first few months postpartum that may be mild, moderate, or severe. Women who have postpartum depression may have some of these symptoms:  Feeling guilty   Not able to enjoy your baby and feeling like you are not bonding with your baby    Not able to sleep, even when the baby is sleeping  Sleeping too much and feeling too tired to get out of bed  Feeling overwhelmed and not able to do what you need to during the day  Not able to concentrate  Don t feel like eating  Feeling like you are not normal or not yourself anymore  Not able to make decisions  Feeling like a failure as a mother  Feeling lonely or all alone  Thinking your baby might be better off without you  If you have any of these symptoms, call your health care provider!    Which symptoms of postpartum depression are dangerous?  Sometimes a woman with postpartum depression will have thoughts of harming herself or her baby. If you find yourself thinking about hurting yourself or your baby, call your health care provider immediately.    MOTHERHOOD: THE EARLY DAYS  You prepare for the birth of your baby for many months during pregnancy, and then the first months at home after your baby is born can be a quiet, gentle time of getting to know this new person who has come to live in your home. But for most women it is not all quiet or sweet. And for some women it is a very hard time.  What Can I Expect in the First Few Months After the Baby Comes?  New mothers and their families face many challenges in the first few months:  Your body and your hormones have to get back to normal.  You and the baby will be learning to breastfeed.  Babies only sleep a  few hours at a time. The entire family will have a hard time getting enough sleep.  You and your family need to learn how to parent this new family member.  If you have a partner, you have to figure out how to stay together as a couple and maybe even start to have sex again.  You may have to figure out how to keep from getting pregnant again right away.  You may need to return to work and find day care.    How Long Will it Take for My Body to Get Back to Normal?  Some changes will occur quickly. Others will not occur as quickly.  Your uterus, cervix, and vagina will all shrink to their nonpregnant size in about 2 weeks. Your vagina may be tender and dry for a few months--especially if you are breastfeeding.  If you had stitches or hemorrhoids, your   bottom   will be sore for 2 weeks or more.  For some women who have problems urinating, it can take several months for you to be able to hold your urine when you cough or sneeze or suddenly  something heavy.  Your breast milk will   come in   2 to 3 days after the birth of your baby. It will take 6 to 8 weeks for you and the baby to get the hang of breastfeeding and find a pattern. During these first weeks, you can have engorged breasts at times and often leak milk.  Your stomach and intestines all have to fall back into place. You may have a lot of gas for a few weeks.  You may be constipated--especially if you are breastfeeding.  Your stretched stomach muscles can recover in a few weeks, but for some women it takes longer--6 months or a year--to recover.  If you had a  delivery, you may have pain or numbness around the incision for 6 months or more.  Losing the weight you gained during pregnancy will probably take 6 months to a year. Have patience! It took 40 weeks to get here. Give yourself 40 weeks to get back.    What Can I Expect When My Hormones Change?  About 75% of all women will get the   blues.   This usually starts about 3 days after the birth  of your baby. You may cry easily and feel very, very tired. A few women become very depressed. If you had a  delivery or your new baby was sick, you are at a higher risk for depression.  Call your health care provider right away if you cannot care for yourself or your baby, if you feel very nervous or worried, if you cannot stop crying, or if you are having thoughts of hurting yourself or your baby.    Taking Care of Yourself  While you are still pregnant:  Talk with your partner and your family about the time ahead. Arrange for someone to help you during the first weeks at home if you can.  Talk with your health care provider about birth control options and make a plan before the baby comes.  If you are worried about how to parent a , take parenting classes. You will learn a lot about how babies act and you will make some friends who are going through the same thing at the same time. Most [facility] have these classes.  Arrange for someone to help with baby care if you can.  After the baby comes:  Ask for help. Let other people do the cooking and cleaning and run the house. Focus on yourself and your baby.  Sleep whenever you can. Try not to be tempted to   get some things done   when the baby sleeps. This is your time to sleep, too.  Drink lots of water. You will need at least 6 big glasses of water everyday to avoid constipation and make enough breast milk. Every time you sit down to breastfeed, have a big glass of water with you to drink while you are nursing.  Eat lots of vegetables and fruit. You will need lots of vitamins and fiber to help your body get back to normal. This will also help you avoid constipation.  Go outside and walk. Babies can go outside even if it is very cold. Fresh air and sunshine will do you both good.  Take sitz baths. Put about 6 inches of warm water in your bathtub and sit in there for 15 minutes 2 to 3 times a day. This will help your   bottom   heal more quickly. It  will also give you 15 minutes of private time!  Talk to other mothers. Join a new parents group. Call [place] and go to [facility] meetings if you are breastfeeding.     With your partner:  Keep talking. Share the experience.  Spend time alone. Even a 30-minute walk can be a date.  Start a birth control method. You can get pregnant before you even have a period. It is very important to use birth control if you do not want to get pregnant again right away.  When you have sex, use a lubricant. A lot of lubricant! Take it slow.  The first few months after a baby comes can be a lot like floating in a jar of honey--very sweet and solis, but very sticky, too. Take time to enjoy the good parts. Remind yourself that this time will pass. Bon voyage!    FOR MORE INFORMATION  For questions about depression during and after pregnancy:  http://www.womenshealth.gov/publications/our-publications/fact-sheet/depression-pregnancy.html   After birth: The first 6 weeks:  http://www.CribFrog/Post-Birth-and-Recovery   Breastfeeding resources:  http://www.Partigi.org/health-info/getting-breastfeeding-off-to-a-good-start/    Preparing for your baby:       Car Seat Clinics:  https://dps.[place].gov/divisions/ots/child-passenger-safety/Pages/car-seat-checks.aspx    [place] Safety [place]: http://www.[place]afetycouncil.org/family/carseatindex.cfm    Child Passenger Safety: Buckle Up Right    When child safety seats and safety belts are used correctly, they can reduce the risk of death by up to 70%. But finding the right combination can be confusing.  How do you know if you should be using an infant-only seat or a convertible seat?  What are booster seats and why do kids need them until they're eight years old or are four feet nine inches tall?  How do you know when a child is ready for your car's safety belt/shoulder strap?  The American Academy of Pediatrics (AAP) recommends that all infants and toddlers should ride  in a Rear-Facing Car Safety Seat until they are two (2) years of age or until they reach the highest weight or height allowed by their car safety seat's .    A child who is both under age 8 and shorter than 4 feet 9 inches is required to be fastened in a child safety seat that meets federal safety standards. Under this law, a child cannot use a seat belt alone until they are age 8, or 4 feet 9 inches tall. It is recommended to keep a child in a booster based on their height rather than their age. Check the instruction book or label of the child safety seat to be sure it is the right seat for your child's weight and height.    www.CarSeatsMadeSimple.org    Car Seat Recycling, -    Get free expert help in a [place] community near you:  [place] Child Passenger Safety Checkup Clinic Calendar    How to Find the Right Car Seat (NHTSA)  Safe Kids [place]    Print Materials  Basic Car Seat Safety checklist  Don't Skip a Step brochure (English); (Malay); (Cook Islander)  Good Going! Adventures in Safety magazine  Fact Sheets  Booster Seat Safety  Outdated and Used Child Safety Seats  A Parents' Checklist: Traffic Safety  Driving Your Child to School  Occupant Protection (Safety Belts and Child Safety Seats): Frequently Asked Questions; Misconceptions and Myths; [place] Laws  Air Bags: How Do Air Bags Work; Frequently Asked Questions      Immunizations:  http://www.cdc.gov/vaccines/schedules/easy-to-read/child.html    [place] Screening Program  Http://www.health.[facility].mn.us/newbornscreening/  [place] newborns are tested soon after birth for more than 50 hidden, rare disorders, including hearing loss and critical congenital heart disease (CCHD). This site provides resources and information for families and providers.    Ask your health care provider about vaccinations you may need following delivery. By now, you should have received a Tdap immunization to protect against pertussis or whooping cough.  Fathers and family members who will be in close contact with the baby should also receive a Tdap shot at least two weeks before the expected birth of the baby if they have not had a Td (tetanus) shot for at least two years.    Your midwife may offer to check your cervix for changes. If you are past your due date, discuss the next steps leading to delivery with your midwife. If you don't start labor on your own by 41 or 42 weeks, your midwife may recommend giving you medicines to ripen your cervix and start labor.  Induction of labor: http://onlinelibrary.preston.com/store/10.1016/j.jmwh.2008.04.018/asset/j.jmwh.2008.04.018.pdf?v=1&t=zbcp3quw&t=[email or web address]    Tell your midwife or physician how you plan to feed your baby (breast or bottle), who you have chosen to do pediatric care for your baby, and if you have a boy, whether you have chosen to have him circumcised. You will need a car seat correctly installed in your vehicle to bring your baby home. As you start to set up the nursery at home for your baby, make sure the crib is safe. The mattress needs to fit snugly against the edges of the crib. If you can fit a soda can between the bars, they are too far apart and can allow the baby's head to caught between them.    Learn about infant care and feeding, including information about infant CPR. We recommend that you put your baby to sleep on his or her back to reduce the chance of Sudden Infant Death Syndrome (SIDS). To maintain a healthy environment in which your child can grow, it's best to keep your home smoke-free. By preparing ahead, your transition into parenthood will go smoothly for you and your baby.    Your midwife will want to see you for a checkup 2 to 6 weeks after delivery.      Making Plans for Feeding My Baby    By this point, you probably have read a lot about feeding your baby.  Breastfeed or formula? Each mother s decision is her own and [facility] respects you and your choices. We ve gathered information on both breastfeeding and formula feeding to help with your decision. Talking with your physician or nurse-midwife can also help in your decision.  However you plan to feed your baby, [facility] encourage rooming in with your baby, skin-to-skin contact and feeding your baby based on his or her cues.    Skin-to-skin contact  Being close to mom helps your baby adjust to life outside of the womb.  It helps your baby regulate their body temperature, heart rate, and breathing.  Your baby will usually be placed skin-to-skin immediately following birth or as soon as possible, if medical intervention is needed.    Rooming-In  Having your baby stay with you in your room is called  rooming-in .  Keeping your baby in your room helps you to learn how to care for your baby by getting to know your baby s cues, body rhythms and sleep cycle.       Cue-based feeding  Cues (signals) are baby s way of telling you what he or she wants.  When you learn your infant s cues, you know how to care for and feed your baby.   Feeding cues are the licking and smacking of lips, bringing their fist to their mouth, and a reflex called  rooting - where baby turns and opens his or her mouth, searching for the breast or bottle.  Crying is a late feeding cue.  Babies can feed frequently, often at least 8 times in 24 hours.  Breastfeeding facts  Breast milk is the best source of nutrition for your baby and is available at birth. In the first couple of days, your milk volume is already starting to increase, though it may not be noticeable. Breastfeed frequently to increase your milk supply. Within three to five days, you will begin to notice larger milk volumes. An increase in breast size, heaviness and firmness are often described as the milk  coming in.  Frequent breastfeeding can help breasts from getting overly firm and painful. You will know the baby  is getting enough milk if your baby is having wet and dirty diapers and gaining weight.     If your goal is to exclusively breastfeed, it is important to not use any formula or artificial nipples (including bottles and pacifiers) while your baby is learning to breastfeed.  While it may seem like an  easy  option to give your baby a bottle, formula should only be given if there is a medical reason for your baby to have it.    Positioning and attachment   Get comfortable.  Use pillows as needed to support your arms and baby.  Hold baby close at the level of your breast, facing you in a tummy to tummy position.  Skin to skin helps with this.  Position the baby with his or her nose by the nipple.  There should be a straight line from baby s ear to shoulder to hips.  Tickle your baby s lips or wait for baby to open mouth wide, bring baby to breast by leading with the chin.  Aim the nipple at the roof of baby s mouth.  A rapid sucking pattern is followed by longer, drawing pattern with occasional swallows heard.  When baby is correctly latched, your nipple and much of the areola are pulled well into baby s mouth.      Returning to work or school  Focus on a good start to breastfeeding.  Many women continue to provide breastmilk for their baby when they return to work or school.  Making plans about where to pump and store milk can make the transition go well.  Talk with other mothers who have also returned to work or school for tips and support.  Your employer s Human Resource department may be a resource as well.     Returning to work or school: (continued)   babies can mean fewer  sick  days for you.  A quality breast pump will also save time and add comfort.  Check with your insurance prior to giving birth for breast pump coverage.  Many insurance companies include a pump within your benefits.  Wait until your baby is at least three weeks old to introduce a bottle for the first time.  Have someone besides you  give the bottle.  Breastfeed when you are with your baby. Reserve your bottles of breast milk for when you are away.   Your breasts will need to be  emptied  either by your baby or a pump.  Plan to pump at least twice in an eight hour day.  If you cannot pump at work, continue breastfeeding at home. Any amount of breast milk is worth giving to your baby.    Formula feeding facts  If you are planning to use formula to feed your baby, you will want to make some preparations ahead of time. Talk to your doctor or nurse-midwife about what type of formula to use. Some are iron-fortified, meaning they have extra iron in them. You will want to purchase formula and bottles before your baby is born to be sure you are ready after you return from the hospital. The [facility] do not provide formula samples to take home.    Be sure to follow formula mixing directions closely. Regular milk in the dairy case at the grocery store should not be given to babies under 1 year old. Baby formula is sold in several forms including:  Ready-to-use. This is the most expensive, but no mixing is necessary.  Concentrated liquid. This is less expensive than ready-to-use and you mix with water.  Powder. This is the least expensive. You mix one level scoop of powdered formula with two ounces of water and stir well.    Most babies need 2.5 ounces of formula per pound of body weight each day. This means an 8-pound baby may drink about 20 ounces of formula a day; however, this is just an estimate. The most important thing is to pay attention to your baby s cues.  If your baby is always fussy, needs more iron or has certain food allergies, your physician may suggest you change your baby s formula to a different kind.     How do I warm my baby s bottles?  You may feed your baby a bottle without warming it first. It is OK for the breast milk or formula to be cool or room temperature. If your baby seems to prefer it warmed, you can put the  filled bottle in a container of warm water and let it stand for a few minutes. Check the temperature of the liquid on your skin before feeding it to your baby; to be sure it isn t too hot. Do not heat bottles in the microwave. Microwaves heat food and liquids unevenly, and this can cause hot spots that can burn your baby.    How do I clean and sterilize bottles?  Sterilize bottles and nipples before you use them for the first time. You can do this by putting them in boiling water for 5 minutes. After that first time, you can wash them in hot and soapy water. Rinse them carefully to be sure there is no soap left on them. You can also wash them in the .    Childbirth and Parenting Education:       Everyday Miracles:   https://www.everyday-miracles.org/    Free Video Series from [facility]: https://nursing.[facility]/academics/specialty-areas/nurse-midwifery/having-baby-prenatal-videos/having-baby-prenatal-and    Childbirth Education virtual (live) classes: www.Hit the Mark/classes  The Birth Hour: https://Virgance/online-childbirth-class/  BirthED: https://www.birthedmn.com/  KARL parenting center: http://[facility] Hospital[facility]/   (143) 225-BABY  [place]: (education, yoga & wellness) www.Classkick  Enlightened Mama: www.enlightenedmama.Single Touch Systems   Childbirth collective: (Parent topic nights)  www.childbirthcollective.org/  Hypnobabies:  www.hypnobabiestwincities.com/  Hypnobirthing:  Http://hypnobirthing.Single Touch Systems/  Hypnobirthing virtual class: www.VidSys/hypnobirthing    Information about doulas:  Childbirth collective: http://www.childbirthcollective.org/  Doulas of [place] (LORNA):  www.lorna.org  [facility]  project: http://Carouselltiesdoulaproject.com/     Early Childhood and Family Education (ECFE):  ECFE offers parents hands-on learning experiences that will nourish a lifetime of teachable moments.  http://ecfe.info/ecfe-home/    APPS and Podcasts:   Ovia  Glow  "Nurture    Evidence Based Birth  The Birth Hour (for birth stories)   Birthful   Expectful   The Longest Shortest Time  PregnancyPodcast Mikayla Karleesebastian    Book Recommendations:   Batsheva Rufus's Birthing From Within--first few chapters include a new-age tone, you may prefer to skip it and keep going, because there is good stuff later.  This book recommendation covers emotional preparation, but does cover coping with pain, and use of both pharmacological and nonpharmacological methods.      Guide to Childbirth by Alysa Rojas  Childbirth Without Fear by Mateo Banegas Read    Dr. Salinas' The Pregnancy Book and The Birth Book--the pregnancy book goes month-by month    The Birth Partner by Tressa High    Womanly Art of Breastfeeding by La Leche League International   Bestfeeding by Rena Loja--great pictures    Mothering Your Nursing Toddler, by Susannah Blanco.   Addresses dealing with so many of the challenging behaviors of a nursing toddler.  How Weaning Happens, by La Leche League.  Discusses weaning at all ages, from medically necessary weaning of an infant, all the way up to age 5 (or older), with why/why not, and strategies.  Very empowering book both for deciding to wean and deciding not to.    American College of Nurse-Midwives (ACNM) http://www.midwife.org/; look at the informational handouts at http://www.midwife.org/Share-With-Women     www.mymidwife.org    Mother to Baby (Medication and Herbal guidance in pregnancy): http://www.mothertobaby.org  Toll-Free Hotline: 151.912.8467  LactMed (Medication use while breastfeeding): http://toxnet.nlm.nih.gov/newtoxnet/lactmed.htm    Women's Health.gov:  http://www.womenshealth.gov/a-z-topics/index.html    American pregnancy association - http://americanpregnancy.org    Centering Pregnancy (group prenatal care option): http://centeringhealthcare.org    March of Dimes www.[place] [place] [employer] - Nutrition  www.mypyramid.gov  Under \"For Consumers,\" click on " "\"pregnant and breastfeeding women.\"      Centers for Disease Control and Prevention (CDC) - Vaccines : http://www.cdc.gov/vaccines/       When researching information on the web, question the validity of websites.  The [place] .gov, .edu and.org tend to be more reliable information.  If there are a lot of advertisements, be cautious of the information provided. Stay away from blogs and chat rooms please!     [facility] Breastfeeding Support    Early Childhood Family [place] [address] provides a free, drop-in class/breastfeeding support group, facilitated by a lactation consultant and .  During the group you can connect with other parents, weigh your baby, ask questions about feeding and baby development, and more.  You do not need to register.  Fall in-person meetings will begin on , are for parents of babies from birth to 9 months, and will meet on Monday evenings from 6 - 7:15 pm in [facility], which is at [address] [place] [address].  [place] [id number] also offers virtual group meetings with a lactation consultant/.  These take place on , from 11:30 am - 12:30 pm for parents of newborns to 3-month-olds, and from 4:15 - 5:15 for parents of 3 - 9 - month olds.  To get the meeting link contact Maria G Flores at [phone].    [facility] offers a free, drop-in breastfeeding support group facilitated by DANTE Randolph.   at [facility] [address], [address], [place].  A scale is available to check baby weights, if desired.  [facility] also has a variety of new parent classes:  (cost for registration)  https://[facility] Beach[facility]uroverse.Octonotco/classes/    [facility] Lactation unge facilitated by DANTE Stokes:  Free, drop-in support group for breastfeeding, with baby scale available if needed.  Meets at [facility], second Tuesday of each month, 10 am - 12 pm.  Text [phone] for info.    [facility] Support " "Group, Tuesdays at 10:30 am   Run by DANTE Ray of [facility]   Go to [facility] Facebook page, click on \"events\" for link:   Https://[email or web address].[email or web address].[email or web address]/    [facility] breastfeeding support community:  free, drop-in breastfeeding support facilitated by Certified Lactation Counselors, open Mondays and Wednesdays from 1 pm - 5 pm.  In [place]:  [facility] [place], [address]:   [facility].org    [facility] Milk Hour, Thursdays at 2:30 pm    Run by Magnus Sanders, DANTE  Go to [facility] FB page and send message to get link   Https://www.Kuaishubao.com.SavingGlobal/Digital Tech Frontierfoundations/    Pal Gonzalez:  http://www.Proximetrylondas.org/    [facility] offers a Lactation Lounge every Friday 12pm - 1pm, run by Pal Ferreira Leader.  Sign up via link at [place]/cbe-lactation   https://www.[place]/cbe-lactation    [facility] is offering virtual support groups every Monday, 10:30 am - 12 pm, run by RN IBCLC: Https://www.Kuaishubao.[email or web address]/events/[email or web address]/    Culturally-Specific Breastfeeding Support:     For Hmong Families:   The Hmong Breastfeeding Coalition is a wonderful support for [place] Hmong women who are breastfeeding.  They are best found on Facebook.    for Black families:    Chocolate Milk Club:  http://www.T[employer] Techn[employer]iesselsmidwiZwamy.SavingGlobal/chocolate-milk-club/  Email: Enrique@[employer]    R.O.S.E. = Reaching our Sisters Everywhere  Http://www.breastfeedingrose.org/    Club Mom breastfeeding support for Black mothers:  Contact Aime Gonzalez  Phone: [phone]   Email:  [email or web address].     Teresa Gamboa  Phone: [phone]   Email:  [email or web address].    Club Dad parent support for Black fathers:   Contact Braeden Osborne   Phone: [phone]   Email:  " "[email or web address].    For Native/Indigenous Families:    https://www.Renmatix.com/groups/nitjuliocesar.gimashkikinaan     Additional Resources:  La Leche [facility] is an international, nonprofit, nonsectarian organization offering information, education, and support to mothers who want to breast-feed their babies. Local groups offer phone help and monthly meetings. Visit [facility] Nutrition [employer] Cardiometabol[facility].org or Zang.org and us the  Find local support  drop down menu or click on the  Resources  tab.    [place] Breastfeeding Resources: 4-595-284-BABY (2229) toll free    National Breastfeeding Help Line trained breastfeeding peer counselors can help answer common breast-feeding questions by phone. Monday-Friday: English/Upper sorbian  [phone]- [phone] toll free, [phone] (TTY)    Virtual Breastfeeding Support:    During this time of isolation, breastfeeding families need even more community!  Here are some area organizations offering virtual support groups for breastfeeding:    [facility] Support Group, Tuesdays at 10:30 am   Run by DANTE Ray of [facility]   Go to [facility] Facebook page and click on \"events\" for link   https://www.[email or web address]/  [facility] Milk Hour, Thursdays at 2:30 pm    Run by DANTE Wills   Go to [facility] FB page and send message to get link   https://www.Renmatix.com/healthfoundations/  [facility]/[place] holding virtual meetings the first Tuesday of each month, 8-9 pm, and the   Third Saturday, 10 - 11 am.  Go to [facility] FB page; message to get link [email or web address]_[email or web address]  [place] offers a Lactation Lounge every Friday 12pm - 1pm, run by John FerreiraCorp[facility] Northwestjanki Leader   Sign up via link at https://www.Spot [employer]/cbe-lactation  [place] " [facility] is offering virtual support groups every Monday, 10:30 am - 12 pm, run by nurse DANTE   Https://www.facebook.com/events/[email or web address]/    Prenatal Breastfeeding Classes:      [facility] is offering virtual breastfeeding and  care classes:  https://www.Sling/education-workshops  [facility] childbirth and breastfeeding education offering virtual prenatal breastfeeding classes  https://www.birthedmn.com/workshops

## 2024-03-13 ENCOUNTER — HOSPITAL ENCOUNTER (OUTPATIENT)
Dept: ULTRASOUND IMAGING | Facility: CLINIC | Age: 24
Discharge: HOME | End: 2024-03-13
Attending: MIDWIFE | Admitting: MIDWIFE
Payer: COMMERCIAL

## 2024-03-13 ENCOUNTER — DOCUMENTATION ONLY (OUTPATIENT)
Dept: MIDWIFE SERVICES | Facility: CLINIC | Age: 24
End: 2024-03-13
Payer: COMMERCIAL

## 2024-03-13 DIAGNOSIS — E66.01 MORBID OBESITY (H): ICD-10-CM

## 2024-03-13 PROCEDURE — 76819 FETAL BIOPHYS PROFIL W/O NST: CPT

## 2024-03-20 ENCOUNTER — OFFICE VISIT (OUTPATIENT)
Dept: MATERNAL FETAL MEDICINE | Facility: HOSPITAL | Age: 24
End: 2024-03-20
Attending: OBSTETRICS & GYNECOLOGY
Payer: COMMERCIAL

## 2024-03-20 ENCOUNTER — PRENATAL OFFICE VISIT (OUTPATIENT)
Dept: MIDWIFE SERVICES | Facility: CLINIC | Age: 24
End: 2024-03-20
Payer: COMMERCIAL

## 2024-03-20 ENCOUNTER — ANCILLARY PROCEDURE (OUTPATIENT)
Dept: ULTRASOUND IMAGING | Facility: HOSPITAL | Age: 24
End: 2024-03-20
Attending: OBSTETRICS & GYNECOLOGY
Payer: COMMERCIAL

## 2024-03-20 VITALS
HEART RATE: 86 BPM | WEIGHT: 246 LBS | SYSTOLIC BLOOD PRESSURE: 126 MMHG | BODY MASS INDEX: 44.28 KG/M2 | DIASTOLIC BLOOD PRESSURE: 76 MMHG

## 2024-03-20 DIAGNOSIS — E66.01 MORBID OBESITY (H): ICD-10-CM

## 2024-03-20 DIAGNOSIS — O99.213 OBESITY AFFECTING PREGNANCY IN THIRD TRIMESTER, UNSPECIFIED OBESITY TYPE: Primary | ICD-10-CM

## 2024-03-20 DIAGNOSIS — O09.93 SUPERVISION OF HIGH RISK PREGNANCY IN THIRD TRIMESTER: Primary | ICD-10-CM

## 2024-03-20 DIAGNOSIS — O99.210 OBESITY IN PREGNANCY, ANTEPARTUM: ICD-10-CM

## 2024-03-20 PROBLEM — R11.2 NAUSEA AND VOMITING: Status: RESOLVED | Noted: 2024-02-08 | Resolved: 2024-03-20

## 2024-03-20 LAB
HGB BLD-MCNC: 11.5 G/DL (ref 11.7–15.7)
HOLD SPECIMEN: NORMAL

## 2024-03-20 PROCEDURE — 99207 PR NO CHARGE LOS: CPT | Performed by: STUDENT IN AN ORGANIZED HEALTH CARE EDUCATION/TRAINING PROGRAM

## 2024-03-20 PROCEDURE — 36415 COLL VENOUS BLD VENIPUNCTURE: CPT | Performed by: ADVANCED PRACTICE MIDWIFE

## 2024-03-20 PROCEDURE — 76819 FETAL BIOPHYS PROFIL W/O NST: CPT

## 2024-03-20 PROCEDURE — 76819 FETAL BIOPHYS PROFIL W/O NST: CPT | Mod: 26 | Performed by: STUDENT IN AN ORGANIZED HEALTH CARE EDUCATION/TRAINING PROGRAM

## 2024-03-20 PROCEDURE — 85018 HEMOGLOBIN: CPT | Performed by: ADVANCED PRACTICE MIDWIFE

## 2024-03-20 PROCEDURE — 99207 PR PRENATAL VISIT: CPT | Performed by: ADVANCED PRACTICE MIDWIFE

## 2024-03-20 PROCEDURE — 76816 OB US FOLLOW-UP PER FETUS: CPT | Mod: 26 | Performed by: STUDENT IN AN ORGANIZED HEALTH CARE EDUCATION/TRAINING PROGRAM

## 2024-03-20 NOTE — NURSING NOTE
Indigo Bright is a  at 36w5d who presents to [facility] for follow up growth ultrasound and BPP. Pt reports positive fetal movement. Pt denies bldg/lof/change in discharge, contractions, headache, vision changes, chest pain/SOB or edema. SBAR given to Dr. CHEVY Dickerson, see note in Epic.

## 2024-03-20 NOTE — PROGRESS NOTES
Please see the full imaging report from the [facility] program under the imaging tab.    Isabel Dickerson MD  Maternal Fetal Medicine

## 2024-03-20 NOTE — PATIENT INSTRUCTIONS
"\"We hope you had a positive experience and that you can definitely recommend [facility] to your family and friends. You ll be receiving a survey soon and we look forward to hearing your feedback\".    [facility] - Contact information:  Appointment line and to get a hold of CNM in clinic Monday-Friday 8 am - 5 pm:  [phone].  There are some clinics with early start times (1st appointment 7:40 am) and others with evening hours (last appointment 6:20 pm).  Most are typically open from 8 am to 5 pm.    CNM on call answering service: [phone].  Specify your hospital of choice and leave a brief message for CNM;  will then page CNM who is on call at your specified hospital and you should receive a call back with 15 minutes.  Be sure that your ringer is audible and that you can accept blocked calls so that we can get back in touch with you! This number should be reserved for urgent needs if during the day, before 8 am, after 5 pm, weekends, holidays.    Contact the on-call CNM with warning signs, such as:  vaginal bleeding   Vaginal discharge and itching or pain and burning during urination  Leg/calf pain or swelling on one side  severe abdominal pain  nausea and vomiting more than 4-5 times a day, or if you are unable to keep anything down  fever more than 100.4 degrees F.     itzbighart  After each of your visits you are welcome to check Honestly Now for your visit summary including education and links to information relevant to your pregnancy and/or well woman care.   Find the \"Visits\" tab at the top of the page, you will see a list of recent visits and for each visit a for link for \"View After Visit Summary.\" View of your After Visit Summary will allow you to read our recommendations from your visit, review any education provided, and link to websites with useful information.   If you have any questions or difficulty navigating HRBoss, please feel free to " "contact us and we will do our best to direct you.    Meet the Midwives from [facility]  You are invited to an informational meet and greet with [facility] Certified Nurse-Midwives. Our free \"Meet the Midwives\" event is a great opportunity to learn about our midwives' philosophy and experience, the hospitals where we can assist with your birth, and answer questions you may have. Partners, friends, and family are welcome to attend. Currently, this is a virtual event.  Dates: Last Thursday of every month at 7 pm.    Link to next (live) meeting  [email or web address].[email or web address]/meet-the-midwives  To Join by Telephone (audio only) Call:   [phone] Phone Conference ID: [phone] #      Touring the Maternity Care Center  At this time we are offering a virtual tour of the Maternity Care Centers at both [facility] and [facility]:   [facility] Maternity Care:   https://[facility].org/locations/the-birthplace-[facility] Care:   https://[facility].org/locations/the-[facility]place-at[facility]    Scroll to the bottom of this hyperlink if the above link does not work      Childbirth and Parenting Education:     Everyday Miracles:   https://www.everyday-miracles.org/    Free Video Series from [facility]: https://nursing.[facility]/academics/specialty-areas/nurse-midwifery/having-baby-prenatal-videos/having-baby-prenatal-and    Childbirth Education virtual (live) classes: www.DealitLive.com/classes  The Birth Hour: https://Addoway/online-childbirth-class/  BirthED: https://www.birthedmn.com/  KARL parenting center: http://ammaparePrime Wire Media.Maharana Infrastructure and Professional Services Private Limited (MIPS)/   (465) 080-BABY  Blooma: (education, yoga & wellness) www.D4P.Maharana [employer] Professional Services Private [employer] (MIPS)  Enlightened Mama: www.enlightenedmama.com   Childbirth collective: (Parent topic nights)  www.childbirthcollective.org/  Hypnobabies:  " www.hypnNearWoobiestMYTRNDcities.com/  Hypnobirthing:  Http://hypnSocial Moov.HunterOn/  Hypnobirthing virtual class: www.Register My InfoÂ®ttEcloud ([place]) Information and Technologybirth.HunterOn/hypnobirthing    Information about doulas:  Childbirth collective: http://www.childbirthcollective.org/  Doulas of [place] (LORNA):  www.lorna.org  Ram Power  project: http://LiveWire TaxdoPRUSLAND SLject.HunterOn/     Early Childhood and Family Education (ECFE):  ECFE offers parents hands-on learning experiences that will nourish a lifetime of teachable moments.  http://ecfe.info/ecfe-home/    APPS and Podcasts:   Andreia Cesar Nurture    Evidence Based Birth  The Birth Hour (for birth stories)   Birthful   Expectful   The Longest Shortest Time  PregnancyPodcast Mikayla Santana    Book Recommendations:   Batsheva Casa Grande's Birthing From Within--first few chapters include a new-age tone, you may prefer to skip it and keep going, because there is good stuff later.  This book recommendation covers emotional preparation, but does cover coping with pain, and use of both pharmacological and nonpharmacological methods.    Guide to Childbirth by Alysa Rojas  Childbirth Without Fear by Mateo Banegas Read    Dr. Salinas' The Pregnancy Book and The Birth Book--the pregnancy book goes month-by month    The Birth Partner by Tressa High    Womanly Art of Breastfeeding by La Leche League International   Bestfeeding by Rena Loja--great pictures    Mothering Your Nursing Toddler, by Susannah Blanco.   Addresses dealing with so many of the challenging behaviors of a nursing toddler.  How Weaning Happens, by La Leche League.  Discusses weaning at all ages, from medically necessary weaning of an infant, all the way up to age 5 (or older), with why/why not, and strategies.  Very empowering book both for deciding to wean and deciding not to.    American College of Nurse-Midwives (ACNM) http://www.midwife.org/; look at the informational handouts at http://www.midwife.org/Share-With-Women    "  www.mymidwife.org    Mother to Baby (Medication and Herbal guidance in pregnancy): http://www.mothertobaby.org  Toll-Free Hotline: 315.649.6955  LactMed (Medication use while breastfeeding): http://toxnet.nlm.nih.gov/newtoxnet/lactmed.htm    Women's Health.gov:  http://www.womenshealth.gov/a-z-topics/index.html    American pregnancy association - http://americanpregnancy.org    Centering Pregnancy (group prenatal care option): http://centeringhealthcare.org    March of Dimes www.LabStyle Innovations.iSyndica     FDA - Nutrition  www.mypyramid.gov  Under \"For Consumers,\" click on \"pregnant and breastfeeding women.\"      Centers for Disease Control and Prevention (CDC) - Vaccines : http://www.cdc.gov/vaccines/       When researching information on the web, question the validity of websites.  The Miyaobabei .Advantagene, Tni BioTech and.org tend to be more reliable information.  If there are a lot of advertisements, be cautious of the information provided. Stay away from blogs and chat rooms please!     Making Plans for Feeding My Baby    By this point, you probably have read a lot about feeding your baby.  Breastfeed or formula? Each parent's decision is their own and [facility] respects you and your choices. We ve gathered information on both breastfeeding and formula feeding to help with your decision. Talking with your nurse-midwife can also help in your decision.  However you plan to feed your baby, [facility] encourage rooming in with your baby, skin-to-skin contact and feeding your baby based on his or her cues.    Skin-to-skin contact  Being close to mom helps your baby adjust to life outside of the womb.  It helps your baby regulate their body temperature, heart rate, and breathing.  Your baby will usually be placed skin-to-skin immediately following birth or as soon as possible, if medical intervention is needed.    Rooming-In  Having your baby stay with you in your room is " called  rooming-in .  Keeping your baby in your room helps you to learn how to care for your baby by getting to know your baby s cues, body rhythms and sleep cycle.       Cue-based feeding  Cues (signals) are baby s way of telling you what he or she wants.  When you learn your infant s cues, you know how to care for and feed your baby.   Feeding cues are the licking and smacking of lips, bringing their fist to their mouth, and a reflex called  rooting - where baby turns and opens his or her mouth, searching for the breast or bottle.  Crying is a late feeding cue.  Babies can feed frequently, often at least 8 times in 24 hours.    Breastfeeding facts  Breast milk is the best source of nutrition for your baby and is available at birth. In the first couple of days, your milk volume is already starting to increase, though it may not be noticeable. Breastfeed frequently to increase your milk supply. Within three to five days, you will begin to notice larger milk volumes. An increase in breast size, heaviness and firmness are often described as the milk  coming in.  Frequent breastfeeding can help breasts from getting overly firm and painful. You will know the baby is getting enough milk if your baby is having wet and dirty diapers and gaining weight.     If your goal is to exclusively breastfeed, it is important to not use any formula or artificial nipples (including bottles and pacifiers) while your baby is learning to breastfeed.  While it may seem like an  easy  option to give your baby a bottle, formula should only be given if there is a medical reason for your baby to have it.      Positioning and attachment   Get comfortable.  Use pillows as needed to support your arms and baby.  Hold baby close at the level of your breast, facing you in a tummy to tummy position.  Skin to skin helps with this.  Position the baby with his or her nose by the nipple.  There should be a straight line from baby s ear to shoulder to  hips.  Tickle your baby s lips or wait for baby to open mouth wide, bring baby to breast by leading with the chin.  Aim the nipple at the roof of baby s mouth.  A rapid sucking pattern is followed by longer, drawing pattern with occasional swallows heard.  When baby is correctly latched, your nipple and much of the areola are pulled well into baby s mouth.      Returning to work or school  Focus on a good start to breastfeeding.  Many women continue to provide breastmilk for their baby when they return to work or school.  Making plans about where to pump and store milk can make the transition go well.  Talk with other mothers who have also returned to work or school for tips and support.  Your employer s Human Resource department may be a resource as well.     Returning to work or school: (continued)   babies can mean fewer  sick  days for you.  A quality breast pump will also save time and add comfort.  Check with your insurance prior to giving birth for breast pump coverage.  Many insurance companies include a pump within your benefits.  Wait until your baby is at least three weeks old to introduce a bottle for the first time.  Have someone besides you give the bottle.  Breastfeed when you are with your baby. Reserve your bottles of breast milk for when you are away.   Your breasts will need to be  emptied  either by your baby or a pump.  Plan to pump at least twice in an eight hour day.  If you cannot pump at work, continue breastfeeding at home. Any amount of breast milk is worth giving to your baby.    Formula feeding facts  If you are planning to use formula to feed your baby, you will want to make some preparations ahead of time. Talk to your doctor or nurse-midwife about what type of formula to use. Some are iron-fortified, meaning they have extra iron in them. You will want to purchase formula and bottles before your baby is born to be sure you are ready after you return from the hospital. The  [facility] hospitals do not provide formula samples to take home.    Be sure to follow formula mixing directions closely. Regular milk in the dairy case at the grocery store should not be given to babies under 1 year old. Baby formula is sold in several forms including:  Ready-to-use. This is the most expensive, but no mixing is necessary.  Concentrated liquid. This is less expensive than ready-to-use and you mix with water.  Powder. This is the least expensive. You mix one level scoop of powdered formula with two ounces of water and stir well.    Most babies need 2.5 ounces of formula per pound of body weight each day. This means an 8-pound baby may drink about 20 ounces of formula a day; however, this is just an estimate. The most important thing is to pay attention to your baby s cues.  If your baby is always fussy, needs more iron or has certain food allergies, your physician may suggest you change your baby s formula to a different kind.     How do I warm my baby s bottles?  You may feed your baby a bottle without warming it first. It is OK for the breast milk or formula to be cool or room temperature. If your baby seems to prefer it warmed, you can put the filled bottle in a container of warm water and let it stand for a few minutes. Check the temperature of the liquid on your skin before feeding it to your baby; to be sure it isn t too hot. Do not heat bottles in the microwave. Microwaves heat food and liquids unevenly, and this can cause hot spots that can burn your baby.    How do I clean and sterilize bottles?  Sterilize bottles and nipples before you use them for the first time. You can do this by putting them in boiling water for 5 minutes. After that first time, you can wash them in hot and soapy water. Rinse them carefully to be sure there is no soap left on them. You can also wash them in the .    Breastfeeding/Chestfeeding Support  Contact us  Breastfeeding/chestfeeding is the natural and  healthy way for parents to feed their babies and provides the best source of nutrition in most cases to infants. Breast milk has health benefits for mom, too. The American Academy of Pediatrics recommends exclusively breastfeeding for 6 months for optimal growth and development and breastfeeding up to at least a year.  Benefits to baby:  Easy digestion, less diarrhea and constipation, breast milk is easy to digest.  Lots of bonding with parent.  Less likely to have asthma.  Less ear infections and respiratory infections.  Less likely to have Type 2 Diabetes.  Less likely to become obese.  Lower risk of Sudden Infant Death Syndrome (SIDS).  Benefits to breastfeeding/chestfeeding parent:  Helps with weight loss.  Lower risk of ovarian and breast cancer, Type 2 diabetes and heart disease.  /chestfed babies are easy to take on trips. Just grab the diapers and go!  Breastfeeding/chestfeeding saves money (no formula or bottle costs, fewer doctor bills and medication costs).  Ready to breastfeed/chestfeed anywhere, don t need to make and wash bottles.  Breastfeeding/chestfeeding is wonderful, but not always easy. Learning what to expect ahead of time and get support from a lactation professional. Check out the [facility] Breastfeeding Resource Guide for classes, drop in support groups, childbirth education, Doulas and clinic and hospital lactation services.     [place] [facility] [place] [address] provides a free, drop-in class/breastfeeding support group, facilitated by a lactation consultant and .  During the group you can connect with other parents, weigh your baby, ask questions about feeding and baby development, and more.  You do not need to register.  Fall in-person meetings will begin on September 12, are for parents of babies from birth to 9 months, and will meet on Monday evenings from 6 - 7:15 pm in Critical access hospital Site [facility], which is at [address] [facility].  [facility]  "[place] [address] also offers virtual group meetings with a lactation consultant/.  These take place on , from 11:30 am - 12:30 pm for parents of newborns to 3-month-olds, and from 4:15 - 5:15 for parents of 3 - 9 - month olds.  To get the meeting link contact Maria G Flores at [phone].    [facility] offers a free, drop-in breastfeeding support group facilitated by DANTE Randolph.   at [facility] [address], [place].  A scale is available to check baby weights, if desired.  [facility] also has a variety of new parent classes:  (cost for registration)  https://Band Metrics/classes/    [facility] Lactation Lounge facilitated by DANTE Stokes:  Free, drop-in support group for breastfeeding, with baby scale available if needed.  Meets at [facility], second Tuesday of each month, 10 am - 12 pm.  Text [phone] for info.    [place] [facility] Support Group,  at 10:30 am   Run by DANTE Ray of The Baby Whisperer Lactation Consultants   Go to The Baby Whisperer Lactation Consultants Facebook page, click on \"events\" for link:   Https://www.Realitycheck.com/events/[email or web address]/    [facility] [place] breastfeeding support community:  free, drop-in breastfeeding support facilitated by Certified Lactation Counselors, open  and  from 1 pm - 5 pm.  In [place]:  Guiding [facility], [address] [facility]:   guidingWellmont Health Systemko.[facility] Milk Hour,  at 2:30 pm    Run by DANTE Wills  Go to [facility] FB page and send message to get link   Https://www.Realitycheck.com/healthfoundations/    Pal Gonzalez:  http://www.leilasugar.org/    [facility] offers a Lactation Lounge every Friday 12pm - 1pm, run by Pal Ferreira Leader.  Sign up via link at blooma.com/cbe-lactation   https://www.Clear Story Systems/cbe-lactation    [place] [facility] " [place] is offering virtual support groups every Monday, 10:30 am - 12 pm, run by RN IBCLC: Https://www.facebook.com/events/[email or web address]/    Culturally-Specific Breastfeeding Support:     For Hmong Families:   The Hmong Breastfeeding Coalition is a wonderful support for [place] Hmong women who are breastfeeding.  They are best found on Facebook.    for Black families:    Sunesis Pharmaceuticalscolate Milk Club:  http://www.CareXtend/chocolate-milk-club/  Email: Enrique@[email or web address]    R.O.S.E. = Reaching our Sisters Everywhere  Http://www.breastfeedingrose.org/    Club Mom breastfeeding support for Black mothers:  Contact Aime Gonzalez  Phone: [phone]   Email:  [email or web address].     Teresa Gamboa  Phone: [phone]   Email:  Marc@coHappy Days - A New MusicalThe [facility].    Club Dad parent support for Black fathers:   Contact Braeden Osborne   Phone: [phone]   Email:  Reilly@[facility]    For Native/Indigenous Families:    https://www.Eat Your Kimchi.com/groups/nitamising.gimashkikinaan     Additional Resources:  La Leche League is an international, nonprofit, nonsectarian organization offering information, education, and support to mothers who want to breast-feed their babies. Local groups offer phone help and monthly meetings. Visit RingRang.org or Cympel.org and us the  Find local support  drop down menu or click on the  Resources  tab.    [place] Breastfeeding Resources: 0-546-442-BABY (2229) toll free    National Breastfeeding Help Line trained breastfeeding peer counselors can help answer common breast-feeding questions by phone. Monday-Friday: English/Kazakh  0-361- [phone] toll free, [phone] (TTY)    Virtual Breastfeeding Support:    During this time of isolation, breastfeeding families need even more community!  Here are some area organizations offering virtual support groups for breastfeeding:    [place] Cafe Support Group, Tuesdays at 10:30 am   Run by [place]  "DANTE Malik of The Baby Whisperer Lactation Consultants   Go to The Baby Whisperer Lactation Consultants Facebook page and click on \"events\" for link   https://www.facebook.com/events/943981899753980/  [facility] Milk Hour,  at 2:30 pm    Run by DANTE Wills   Go to [facility] FB page and send message to get link   https://www.Adonit.Carolus Therapeutics/healthfoundations/  [facility]/[place] holding virtual meetings the first Tuesday of each month, 8-9 pm, and the   Third Saturday, 10 - 11 am.  Go to [facility] and [place] FB page; message to get link https://www.Yeelion/LLLofGoldenCarlos Enrique/?hc_location=[facility]  [facility] offers a Lactation Lounge every Friday 12pm - 1pm, run by [facility] Ferreira[facility] Leader   Sign up via link at https://www.Spare to Share/cbe-lactation  [facility] is offering virtual support groups every Monday, 10:30 am - 12 pm, run by nurse IBCLC   Https://www.facebook.com/events/077084042576268/    Prenatal Breastfeeding Classes:      [facility] is offering virtual breastfeeding and  care classes:  https://www.Spare to Share/education-workshops  BirthEd childbirth and breastfeeding education offering virtual prenatal breastfeeding classes  https://www.birthedmn.com/workshops    Preparing for your baby:     Badger Screening Program  Http://www.health.[facility].mn.us/newbornscreening/  [place] newborns are tested soon after birth for more than 50 hidden, rare disorders, including hearing loss and critical congenital heart disease (CCHD). This site provides resources and information for families and providers.    When to call:   Appointment line and to get a hold of CNM in clinic Monday-Friday 8 am - 5 pm:  [phone].  There are some clinics with early start times (1st appointment 7:40 am) and others with evening hours (last appointment 6:20 pm).  Most are typically " open from 8 am to 5 pm.    CNM on call answering service: [phone].  Specify your hospital of choice and leave a brief message for CNM;  will then page CNM who is on call at your specified hospital and you should receive a call back with 15 minutes.  Be sure that your ringer is audible and that you can accept blocked calls so that we can get back in touch with you! This number should be reserved for urgent needs if during the day, before 8 am, after 5 pm, weekends, holidays.    Contact the on-call CNM with warning signs, such as:  vaginal bleeding   Vaginal discharge and itching or pain and burning during urination  Leg/calf pain or swelling on one side  severe abdominal pain  nausea and vomiting more than 4-5 times a day, or if you are unable to keep anything down  fever more than 100.4 degrees F.     Make plans for transportation and  as needed for when you are going to the hospital.    Ask your health care provider about vaccinations you may need following delivery. By now, you should have received a Tdap immunization to protect against pertussis or whooping cough. Fathers and family members who will be in close contact with the baby should also receive a Tdap shot at least two weeks before the expected birth of the baby if they have not had a Td (tetanus) shot for at least two years.    Tell your midwife or physician how you plan to feed your baby (breast or bottle), who you have chosen to do pediatric care for your baby, and if you have a boy, whether you have chosen to have him circumcised. You will need a car seat correctly installed in your vehicle to bring your baby home. As you start to set up the nursery at home for your baby, make sure the crib is safe. The mattress needs to fit snugly against the edges of the crib. If you can fit a soda can between the bars, they are too far apart and can allow the baby's head to caught between them.    Learn about infant care and feeding,  including information about infant CPR. We recommend that you put your baby to sleep on his or her back to reduce the chance of Sudden Infant Death Syndrome (SIDS). To maintain a healthy environment in which your child can grow, it's best to keep your home smoke-free. By preparing ahead, your transition into parenthood will go smoothly for you and your baby.    Your midwife will want to see you for a checkup 2 to 6 weeks after delivery.

## 2024-03-25 ENCOUNTER — DOCUMENTATION ONLY (OUTPATIENT)
Dept: MIDWIFE SERVICES | Facility: CLINIC | Age: 24
End: 2024-03-25
Payer: COMMERCIAL

## 2024-03-26 NOTE — PROGRESS NOTES
Indigo Bright is here for SONJA at 37w5d. She presents following her routine fetal surveillance BPP for BMI > 40. This writer spoke with ultrasound Technician and reviewed report of BPP: 6/8, vertex presentation. Advised to present to clinic for NST. FHR:Baseline: 125 bpm, Variability: Moderate (6 - 25 bpm), Accelerations: present and Decelerations: Absent Uterine contractions:TocoFrequency: Every 3-5 minutes, Duration: 30 seconds and Intensity: mild by palpation and pt report. She reports normal, active FM and has BH contractions when she is up and active, sometimes feeling regular but mild in the evening after work but never increasing in intensity. Finds they resolve if resting. Is hydrating about 60 fl oz daily and aware of the need to keep up on this. Current plan following consult with [facility] Dr. RICARDO Kenny is weekly BPPs. Pt is considering IOL later in 39th week. We reviewed IOL in detail, expectations for duration of induction, risks to both expectant management and choosing IOL, and recommendations for women with prepregnancy BMI >= 40 based on the data of stillbirth increasing. We discussed how to make decisions about electing IOL and planning timing of IOL. Encouraged to discuss with her partner and will plan to confirm at her next prenatal visit. Reviewed warning s/sx and reasons to call. RTC 1 week.     Belinda Nielson, DNP, APRN, CNM  [facility]  Midwifery       EXAM: US OB FETAL BIOPHY PROFILE W/O NST SINGLE W/LTD  LOCATION: [facility]  DATE: 3/27/2024     INDICATION: Weekly BPP due to BMI>40  COMPARISON: 03/20/2024     FINDINGS:  Single living fetus, vertex presentation.     HEART RATE: 138 bpm.  SDP 3.8 cm.  PLACENTA: Posterior.  CERVIX: Obscured.      CORD DOPPLER: S/D ratio: Not performed.     2/2 fetal breathing  2/2 fetal movements  0/2 fetal tone  2/2 amniotic fluid     Total biophysical profile 6/8                                                                       IMPRESSION:  1.  Single living intrauterine gestation in vertex presentation.  2.  Abnormal 6/8 biophysical profile.  3.  This report was called to the covering nurse midwife at the time of dictation.

## 2024-03-27 ENCOUNTER — HOSPITAL ENCOUNTER (OUTPATIENT)
Dept: ULTRASOUND IMAGING | Facility: CLINIC | Age: 24
Discharge: HOME | End: 2024-03-27
Attending: ADVANCED PRACTICE MIDWIFE | Admitting: ADVANCED PRACTICE MIDWIFE
Payer: COMMERCIAL

## 2024-03-27 ENCOUNTER — PRENATAL OFFICE VISIT (OUTPATIENT)
Dept: MIDWIFE SERVICES | Facility: CLINIC | Age: 24
End: 2024-03-27
Payer: COMMERCIAL

## 2024-03-27 VITALS
DIASTOLIC BLOOD PRESSURE: 80 MMHG | HEIGHT: 63 IN | SYSTOLIC BLOOD PRESSURE: 130 MMHG | BODY MASS INDEX: 44.12 KG/M2 | WEIGHT: 249 LBS | HEART RATE: 84 BPM

## 2024-03-27 DIAGNOSIS — R82.71 GBS BACTERIURIA: ICD-10-CM

## 2024-03-27 DIAGNOSIS — E66.01 MORBID OBESITY (H): ICD-10-CM

## 2024-03-27 DIAGNOSIS — O09.93 SUPERVISION OF HIGH RISK PREGNANCY IN THIRD TRIMESTER: ICD-10-CM

## 2024-03-27 DIAGNOSIS — O09.93 SUPERVISION OF HIGH RISK PREGNANCY IN THIRD TRIMESTER: Primary | ICD-10-CM

## 2024-03-27 PROCEDURE — 76819 FETAL BIOPHYS PROFIL W/O NST: CPT

## 2024-03-27 PROCEDURE — 59025 FETAL NON-STRESS TEST: CPT | Performed by: ADVANCED PRACTICE MIDWIFE

## 2024-03-27 PROCEDURE — 99207 PR PRENATAL VISIT: CPT | Performed by: ADVANCED PRACTICE MIDWIFE

## 2024-03-27 NOTE — PATIENT INSTRUCTIONS
BMI (Body Mass Index) is a screening tool used to measure body fat content in men and women.  It is based on your height and weight at the beginning of pregnancy.  It is one way to help determine possible risks in pregnancy.    Women with a BMI greater than 35 are at greater risk for:  Diabetes  Diabetes during pregnancy (gestational diabetes)  Hypertension disorders during pregnancy  Early birth, related to medical recommendations in pregnancy  Pregnancy going past your 42 week  Obstructive sleep apnea (when breathing stops), which increase the risk for preeclampsia (a complication in pregnancy) and gestational diabetes  Induction and induction failure  Longer labor  Difficult anesthesia   section  Macrosomia (large baby) which can increase the risk of dysfunctional labor, vacuum assisted birth, tearing, and excessive bleeding  Venous Thromboembolism (blood clots)  Excessive bleeding after birth  Infection after birth  Depression after birth (postpartum depression)  Congenital anomalies (abnormal development of the baby's organs)  Early pregnancy loss  Stillbirth (4.5 times the normal risk leading to 25 losses per 1000 babies born)    We are excited to have you in our care and want to partner with you to help you and your baby be as healthy as you can be!    So that we best minimize your risk, we recommend:   screen for diabetes early in pregnancy,   eat a healthy diet,   consider taking a higher amound of folic acid (5mg) and vitamin D supplements,   no weight gain during your pregnancy, and   exercise 20-30 minutes most of the days of the week or exercise for 1 hour 2-3 times per week. Even just 15 minutes of walking daily after meals helps your body control blood sugar better.     You may also benefit from a sleep study if you also snore, your partner observed you stop breathing during sleep, or you have high blood pressure.      Although there is no agreement on whether monitoring your baby in pregnancy  reduces stillbirth or other risks, to help ensure well-being for you and your baby, we may recommend or offer the following:  An early 1st trimester ultrasound  Nutrition consult with our clinic staff  A higher level ultrasound with a specialist around 20 weeks of pregnancy  Ultrasounds to monitor your baby's growth at the end of pregnancy, every 4-6 weeks  Monitoring with ultrasound and extra clinic visits the baby's well being during your pregnancy weekly beginning at 34-37 weeks  An earlier delivery at 39 weeks of pregnancy  Internal monitors be used during labor and delivery to better assess contractions and fetal heart rate  A consultation with our anesthesia team if you choose an epidural  Uterotonics (medication to decrease risk of severe hemorrhage) at the time of delivery  Preventative medication for blood clots after your birth  Screening for diabetes after your pregnancy  Encourage weight loss after pregnancy and before a future pregnancy    How can we help improve success? Midwifery care involves support that is shown to increase your success!  Childbirth education and preparation   Support during your labor: increase your mobility/upright positions, restorative hydration and eating  Support for low-intervention (physiologic) birth practices: offer to wait for labor to start on its own (avoid induction), intermittent monitoring of the fetal heart rate when appropriate, hydrotherapy or nitrous oxide for pain management to increase mobility, avoiding repeated exams and early release of flowers, respectful birth environments to increase your hormones and trust in this process      As always, you are integral in the decisions made about your care.  You have the right to decline offered interventions or tests.  We invite you to talk with your family and care provider to help decide what is best for you.  Please let us know how we can help you now and throughout your pregnancy.    Further Reading:   [employer] Website www.[employer].org Connecting the Dots, Maternal obesity from All Sides series  Well-[employer] Mama Blog Spot    Week 38 of Your Pregnancy: Care Instructions  Believe it or not, your baby is almost here. You may notice how your baby responds to sounds, warmth, cold, and light. You may even know what kind of music your baby likes.    Even if you expect a vaginal birth, it's a good idea to learn about  section ().  is the delivery of a baby through a cut (incision) in your belly. It's a major surgery, so it has more risks than a vaginal birth.    During the first 2 weeks after the birth, limit visitors. Don't allow visitors who have colds or infections. Ask visitors to wash their hands before they touch your baby. And never let anyone smoke around your baby.    Know about unplanned C-sections.  Reasons for an unplanned  include labor that slows or stops, signs of distress in your baby, and high blood pressure or other problems for you.     Know about planned C-sections.  If your baby isn't in a head-down position for delivery (breech position), your doctor may plan a . Or you may have a planned  if you're having twins or more.     Get as much help as you can while you're in the hospital.  You can learn about feeding, diapering, and bathing your baby.     Talk to your doctor or midwife about how to care for the umbilical cord stump.  If your baby will be circumcised, also ask about how to care for that.     Ask friends or family for help, as you need it.  If you can, have another adult in your home for at least 2 or 3 days after the birth.     Try to nap when your baby naps.  This may be your best chance to get some sleep.     Watch for changes in your mental health.  For the first 1 to 2 weeks after birth, it's common to cry or feel sad or irritable. If these feelings last for more than 2 weeks, you may have postpartum depression. Ask your doctor for  "help. Postpartum depression can be treated.   Follow-up care is a key part of your treatment and safety. Be sure to make and go to all appointments, and call your doctor if you are having problems. It's also a good idea to know your test results and keep a list of the medicines you take.  Where can you learn more?  Go to https://www.[employer].net/patiented  Enter B044 in the search box to learn more about \"Week 38 of Your Pregnancy: Care Instructions.\"  Current as of: July 10, 2023               Content Version: 14.0    8566-4465 [employer].   Care instructions adapted under license by your healthcare professional. If you have questions about a medical condition or this instruction, always ask your healthcare professional. [employer] disclaims any warranty or liability for your use of this information.      Labor Induction  What You Need to Know  What is labor induction?  In most cases, it is best to go into labor naturally. When labor does not start on its own, we may use medicine or other methods to start (induce) labor. This is called induction, which:  Helps the uterus contract  Thins, softens and opens the cervix (opening of the uterus)  When is induction used?  There are two types of induction.  Medical induction may be done to protect the health of the parent or baby if:  There are medical concerns for you or your baby.  You haven't had your baby by 41 weeks.  Induction for non-medical reasons may be done at 39 weeks or later if:  You live far from the hospital.  You've been having contractions for many days.  You've given birth quickly in the past.   We will not perform an induction for non-medical reasons before 39 weeks. Studies show that babies born before 39 weeks may struggle with breathing, feeding, sleeping and staying warm. They are more likely to have health problems and may need to stay in the hospital longer. If we start your labor for medical reasons, the benefits " "will outweigh these risks. We will talk to you about your risks, benefits and alternatives (other options) before we start your labor.  How is induction done?  We may start your labor by doing one or more of the following:  We may need to help your cervix soften and open (sometimes called \"ripening\") to prepare it for labor. There are two ways to do this:  Medicines--these may be in the form of pills that you swallow or medicines that are put into the vagina next to the cervix.  Mechanical--using either a single or double balloon. These are small balloons that are attached to tubes that go up inside the cervix. The balloons are then filled with water to put pressure on the cervix and help the cervix soften and open. Depending on the type of balloon used, you may be allowed to go home after it is placed.  After your cervix is ready:  We may give you medicine through an IV (a small tube placed in your vein). This medicine is called Pitocin. It helps the muscles of your uterus to contract.  We may make a small opening in your bag of water (the sac around your baby). This is called an amniotomy. Sometimes called \"breaking the water\". It may help your uterus contract and your cervix open.  What might happen if my labor is induced?  Some of this depends on how your labor is started and how your body responds. Your labor may be more complicated. You and your baby may need more medical treatments. In general:  You may not go into labor right away. If so, we may send you home with follow-up instructions.  It will be important to monitor you and your baby during the induction.  You may not be able to move around during labor. You will have two belts with monitors attached to your belly. These allow the nurse to watch your contractions and your baby's heart rate.  Your labor may take longer than if you went into labor on your own. It might take more than one day.  If you need cervical ripening, it is important to know that it " may be many hours (even days) until delivery happens.  Cervical ripening can be slow and require several doses before your body is ready to labor.  A long labor may increase the risk of infection in mother and baby.  Your labor may not progress as planned. This could lead to a  birth.  Can I plan the date of my delivery?  After 39 weeks, you may ask about planning your delivery date. This is only an option if your body--and your baby--are ready. To find out, we will check your cervix and your baby's heart rate.   If you are ready to be induced, we will give you a date and time to come to the hospital. If many patients are in labor that day, we may need to start your labor at another time.  If you are not ready, we will not start your labor. It will be safer for your baby to come on its own.  What else do I need to know?  Before you have an induction, make sure you understand the reasons, risks and benefits. Ask your doctor or nurse-midwife:  Why do I need to be induced?  What are the risks to my baby?  How will you start my labor?  How will you know if my baby is ready to be born?  How will you know if my body is ready to give birth?  Where can I get more information?  To learn more about induction, you may visit these websites:  The American College of Nurse-Midwives:  www.mymidwife.org  The American College of Obstetricians and Gynecologists: www.acog.org  Childbirth Connection:  www.childbirthconnection.org  March of Dimes: www.marchofdimes.com  Association of Women's Health, Obstetrics, and  Nursing  www.sgmfoi2ols.org/go-the-full-40&#047;  For informational purposes only. Not to replace the advice of your health care provider. Copyright   2008 [employer]. All rights reserved. Clinically reviewed by the  System Operations Leadership team. [employer] [id number] - REV .

## 2024-04-03 ENCOUNTER — HOSPITAL ENCOUNTER (OUTPATIENT)
Dept: ULTRASOUND IMAGING | Facility: CLINIC | Age: 24
Discharge: HOME | End: 2024-04-03
Attending: MIDWIFE | Admitting: MIDWIFE
Payer: COMMERCIAL

## 2024-04-03 ENCOUNTER — PRENATAL OFFICE VISIT (OUTPATIENT)
Dept: MIDWIFE SERVICES | Facility: CLINIC | Age: 24
End: 2024-04-03
Payer: COMMERCIAL

## 2024-04-03 VITALS
WEIGHT: 247 LBS | BODY MASS INDEX: 44.46 KG/M2 | HEART RATE: 86 BPM | SYSTOLIC BLOOD PRESSURE: 126 MMHG | DIASTOLIC BLOOD PRESSURE: 78 MMHG

## 2024-04-03 DIAGNOSIS — O09.93 SUPERVISION OF HIGH RISK PREGNANCY IN THIRD TRIMESTER: Primary | ICD-10-CM

## 2024-04-03 DIAGNOSIS — E66.01 MORBID OBESITY (H): ICD-10-CM

## 2024-04-03 DIAGNOSIS — R82.71 GBS BACTERIURIA: ICD-10-CM

## 2024-04-03 PROCEDURE — 99207 PR PRENATAL VISIT: CPT | Performed by: MIDWIFE

## 2024-04-03 PROCEDURE — 76819 FETAL BIOPHYS PROFIL W/O NST: CPT

## 2024-04-03 NOTE — PATIENT INSTRUCTIONS
"\"We hope you had a positive experience and that you can definitely recommend [facility] to your family and friends. You ll be receiving a survey soon and we look forward to hearing your feedback\".    [facility] - Contact information:  Appointment line and to get a hold of CNM in clinic Monday-Friday 8 am - 5 pm:  [phone].  There are some clinics with early start times (1st appointment 7:40 am) and others with evening hours (last appointment 6:20 pm).  Most are typically open from 8 am to 5 pm.    CNM on call answering service: [phone].  Specify your hospital of choice and leave a brief message for CNM;  will then page CNM who is on call at your specified hospital and you should receive a call back with 15 minutes.  Be sure that your ringer is audible and that you can accept blocked calls so that we can get back in touch with you! This number should be reserved for urgent needs if during the day, before 8 am, after 5 pm, weekends, holidays.    Contact the on-call CNM with warning signs, such as:  vaginal bleeding   Vaginal discharge and itching or pain and burning during urination  Leg/calf pain or swelling on one side  severe abdominal pain  nausea and vomiting more than 4-5 times a day, or if you are unable to keep anything down  fever more than 100.4 degrees F.     USB Promoshart  After each of your visits you are welcome to check Rambus for your visit summary including education and links to information relevant to your pregnancy and/or well woman care.   Find the \"Visits\" tab at the top of the page, you will see a list of recent visits and for each visit a for link for \"View After Visit Summary.\" View of your After Visit Summary will allow you to read our recommendations from your visit, review any education provided, and link to websites with useful information.   If you have any questions or difficulty navigating LearnVest, please feel free to " "contact us and we will do our best to direct you.    Meet the Midwives from [facility]  You are invited to an informational meet and greet with [facility] Certified Nurse-Midwives. Our free \"Meet the Midwives\" event is a great opportunity to learn about our midwives' philosophy and experience, the hospitals where we can assist with your birth, and answer questions you may have. Partners, friends, and family are welcome to attend. Currently, this is a virtual event.  Dates: Last Thursday of every month at 7 pm.    Link to next (live) meeting  [email or web address]/meet-the-[email or web address]  To Join by Telephone (audio only) Call:   [phone] Phone Conference ID: [phone] #      Touring the Maternity Care Center  At this time we are offering a virtual tour of the Maternity Care Centers at both [facility] and [facility]:   [facility] Maternity Care:   https://[facility].org/locations/the-[facility]place-[facility]:   https://[facility].org/locations/the-[facility]place-at[facility]    Scroll to the bottom of this hyperlink if the above link does not work      Childbirth and Parenting Education:     Everyday Miracles:   https://www.everyday-miracles.org/    Free Video Series from [facility]: https://nursing.[facility]/academics/specialty-areas/nurse-midwifery/having-baby-prenatal-videos/having-baby-prenatal-and    Childbirth Education virtual (live) classes: www.Divide/classes  The Birth Hour: https://Kijubi/online-childbirth-class/  BirthED: https://www.birthedmn.com/  KARL parenting center: http://ammapareBecovillage.Localo/   (366) 601-BABY  Blooma: (education, yoga & wellness) www.Ansira.Localo  Enlightened Mama: www.enlightenedmama.com   Childbirth collective: (Parent topic nights)  www.childbirthcollective.org/  Hypnobabies:  " www.BiosceptrebiestZootcardcities.Acertiv/  Hypnobirthing:  Http://hypnStriiv.Acertiv/  Hypnobirthing virtual class: www.Webspy.Acertiv/hypnobirthing    Information about doulas:  Childbirth collective: http://www.childbirthcollective.org/  Doulas of [place] (LORNA):  www.lorna.org  Dynamic Energy  project: http://AppceleratordoLearneratorject.Acertiv/     Early Childhood and Family Education (ECFE):  ECFE offers parents hands-on learning experiences that will nourish a lifetime of teachable moments.  http://ecfe.info/ecfe-home/    APPS and Podcasts:   Andreia Cesar Nurture    Evidence Based Birth  The Birth Hour (for birth stories)   Birthful   Expectful   The Longest Shortest Time  PregnancyPodcast Mikaylaisi Santana  https://www.downtobirthshow.com/    Book Recommendations:   Batsheva Minneola's Birthing From Within--first few chapters include a new-age tone, you may prefer to skip it and keep going, because there is good stuff later.  This book recommendation covers emotional preparation, but does cover coping with pain, and use of both pharmacological and nonpharmacological methods.    Guide to Childbirth by Alysa Rojas  Childbirth Without Fear by Mateo Banegas Read    Dr. Salinas' The Pregnancy Book and The Birth Book--the pregnancy book goes month-by month    The Birth Partner by Tressa High    Womanly Art of Breastfeeding by La Leche League International   Bestfeeding by Rena Loja--great pictures    Mothering Your Nursing Toddler, by Susannah Blanco.   Addresses dealing with so many of the challenging behaviors of a nursing toddler.  How Weaning Happens, by La Leche League.  Discusses weaning at all ages, from medically necessary weaning of an infant, all the way up to age 5 (or older), with why/why not, and strategies.  Very empowering book both for deciding to wean and deciding not to.    American College of Nurse-Midwives (ACNM) http://www.midwife.org/; look at the informational handouts at  "http://www.midwife.org/Share-With-Women     www.mymidwife.org    Mother to Baby (Medication and Herbal guidance in pregnancy): http://www.mothertobaby.org  Toll-Free Hotline: 537.452.3892  LactMed (Medication use while breastfeeding): http://toxnet.nlm.nih.gov/newtoxnet/lactmed.htm    Women's Health.gov:  http://www.womenshealth.gov/a-z-topics/index.html    American pregnancy association - http://americanpregnancy.org    Centering Pregnancy (group prenatal care option): http://centeringhealthcare.org    March of Dimes www.Unsilo.Jounce Therapeutics     FDA - Nutrition  www.mypyramid.gov  Under \"For Consumers,\" click on \"pregnant and breastfeeding women.\"      Centers for Disease Control and Prevention (CDC) - Vaccines : http://www.cdc.gov/vaccines/       When researching information on the web, question the validity of websites.  The [place] .gov, .edu and.org tend to be more reliable information.  If there are a lot of advertisements, be cautious of the information provided. Stay away from blogs and chat rooms please!     Making Plans for Feeding My Baby    By this point, you probably have read a lot about feeding your baby.  Breastfeed or formula? Each parent's decision is their own and [facility] respects you and your choices. We ve gathered information on both breastfeeding and formula feeding to help with your decision. Talking with your nurse-midwife can also help in your decision.  However you plan to feed your baby, [facility] encourage rooming in with your baby, skin-to-skin contact and feeding your baby based on his or her cues.    Skin-to-skin contact  Being close to mom helps your baby adjust to life outside of the womb.  It helps your baby regulate their body temperature, heart rate, and breathing.  Your baby will usually be placed skin-to-skin immediately following birth or as soon as possible, if medical intervention is needed.    Rooming-In  Having " your baby stay with you in your room is called  rooming-in .  Keeping your baby in your room helps you to learn how to care for your baby by getting to know your baby s cues, body rhythms and sleep cycle.       Cue-based feeding  Cues (signals) are baby s way of telling you what he or she wants.  When you learn your infant s cues, you know how to care for and feed your baby.   Feeding cues are the licking and smacking of lips, bringing their fist to their mouth, and a reflex called  rooting - where baby turns and opens his or her mouth, searching for the breast or bottle.  Crying is a late feeding cue.  Babies can feed frequently, often at least 8 times in 24 hours.    Breastfeeding facts  Breast milk is the best source of nutrition for your baby and is available at birth. In the first couple of days, your milk volume is already starting to increase, though it may not be noticeable. Breastfeed frequently to increase your milk supply. Within three to five days, you will begin to notice larger milk volumes. An increase in breast size, heaviness and firmness are often described as the milk  coming in.  Frequent breastfeeding can help breasts from getting overly firm and painful. You will know the baby is getting enough milk if your baby is having wet and dirty diapers and gaining weight.     If your goal is to exclusively breastfeed, it is important to not use any formula or artificial nipples (including bottles and pacifiers) while your baby is learning to breastfeed.  While it may seem like an  easy  option to give your baby a bottle, formula should only be given if there is a medical reason for your baby to have it.      Positioning and attachment   Get comfortable.  Use pillows as needed to support your arms and baby.  Hold baby close at the level of your breast, facing you in a tummy to tummy position.  Skin to skin helps with this.  Position the baby with his or her nose by the nipple.  There should be a straight  line from baby s ear to shoulder to hips.  Tickle your baby s lips or wait for baby to open mouth wide, bring baby to breast by leading with the chin.  Aim the nipple at the roof of baby s mouth.  A rapid sucking pattern is followed by longer, drawing pattern with occasional swallows heard.  When baby is correctly latched, your nipple and much of the areola are pulled well into baby s mouth.      Returning to work or school  Focus on a good start to breastfeeding.  Many women continue to provide breastmilk for their baby when they return to work or school.  Making plans about where to pump and store milk can make the transition go well.  Talk with other mothers who have also returned to work or school for tips and support.  Your employer s Human Resource department may be a resource as well.     Returning to work or school: (continued)   babies can mean fewer  sick  days for you.  A quality breast pump will also save time and add comfort.  Check with your insurance prior to giving birth for breast pump coverage.  Many insurance companies include a pump within your benefits.  Wait until your baby is at least three weeks old to introduce a bottle for the first time.  Have someone besides you give the bottle.  Breastfeed when you are with your baby. Reserve your bottles of breast milk for when you are away.   Your breasts will need to be  emptied  either by your baby or a pump.  Plan to pump at least twice in an eight hour day.  If you cannot pump at work, continue breastfeeding at home. Any amount of breast milk is worth giving to your baby.    Formula feeding facts  If you are planning to use formula to feed your baby, you will want to make some preparations ahead of time. Talk to your doctor or nurse-midwife about what type of formula to use. Some are iron-fortified, meaning they have extra iron in them. You will want to purchase formula and bottles before your baby is born to be sure you are ready after  you return from the hospital. The [facility] do not provide formula samples to take home.    Be sure to follow formula mixing directions closely. Regular milk in the dairy case at the grocery store should not be given to babies under 1 year old. Baby formula is sold in several forms including:  Ready-to-use. This is the most expensive, but no mixing is necessary.  Concentrated liquid. This is less expensive than ready-to-use and you mix with water.  Powder. This is the least expensive. You mix one level scoop of powdered formula with two ounces of water and stir well.    Most babies need 2.5 ounces of formula per pound of body weight each day. This means an 8-pound baby may drink about 20 ounces of formula a day; however, this is just an estimate. The most important thing is to pay attention to your baby s cues.  If your baby is always fussy, needs more iron or has certain food allergies, your physician may suggest you change your baby s formula to a different kind.     How do I warm my baby s bottles?  You may feed your baby a bottle without warming it first. It is OK for the breast milk or formula to be cool or room temperature. If your baby seems to prefer it warmed, you can put the filled bottle in a container of warm water and let it stand for a few minutes. Check the temperature of the liquid on your skin before feeding it to your baby; to be sure it isn t too hot. Do not heat bottles in the microwave. Microwaves heat food and liquids unevenly, and this can cause hot spots that can burn your baby.    How do I clean and sterilize bottles?  Sterilize bottles and nipples before you use them for the first time. You can do this by putting them in boiling water for 5 minutes. After that first time, you can wash them in hot and soapy water. Rinse them carefully to be sure there is no soap left on them. You can also wash them in the .    Breastfeeding/Chestfeeding Support  Contact  us  Breastfeeding/chestfeeding is the natural and healthy way for parents to feed their babies and provides the best source of nutrition in most cases to infants. Breast milk has health benefits for mom, too. The American Academy of Pediatrics recommends exclusively breastfeeding for 6 months for optimal growth and development and breastfeeding up to at least a year.  Benefits to baby:  Easy digestion, less diarrhea and constipation, breast milk is easy to digest.  Lots of bonding with parent.  Less likely to have asthma.  Less ear infections and respiratory infections.  Less likely to have Type 2 Diabetes.  Less likely to become obese.  Lower risk of Sudden Infant Death Syndrome (SIDS).  Benefits to breastfeeding/chestfeeding parent:  Helps with weight loss.  Lower risk of ovarian and breast cancer, Type 2 diabetes and heart disease.  /chestfed babies are easy to take on trips. Just grab the diapers and go!  Breastfeeding/chestfeeding saves money (no formula or bottle costs, fewer doctor bills and medication costs).  Ready to breastfeed/chestfeed anywhere, don t need to make and wash bottles.  Breastfeeding/chestfeeding is wonderful, but not always easy. Learning what to expect ahead of time and get support from a lactation professional. Check out the [facility] Breastfeeding Resource Guide for classes, drop in support groups, childbirth education, Doulas and clinic and hospital lactation services.     Early Childhood Family Education [place] [address] provides a free, drop-in class/breastfeeding support group, facilitated by a lactation consultant and .  During the group you can connect with other parents, weigh your baby, ask questions about feeding and baby development, and more.  You do not need to register.  Fall in-person meetings will begin on September 12, are for parents of babies from birth to 9 months, and will meet on Monday evenings from 6 - 7:15 pm in [facility]  "[address], [place] [id number] [address] [place] [id number] also offers virtual group meetings with a lactation consultant/.  These take place on , from 11:30 am - 12:30 pm for parents of newborns to 3-month-olds, and from 4:15 - 5:15 for parents of 3 - 9 - month olds.  To get the meeting link contact Maria G Flores at [phone].    [facility] offers a free, drop-in breastfeeding support group facilitated by DANTE Randolph.   at [place] [facility] [address], [place].  A scale is available to check baby weights, if desired.  [place] also has a variety of new parent classes:  (cost for registration)  https://Dualog/classes/    [facility] Lactation Lounge facilitated by DANTE Stokes:  Free, drop-in support group for breastfeeding, with baby scale available if needed.  Meets at [facility], second Tuesday of each month, 10 am - 12 pm.  Text [phone] for info.    [place] Support Group,  at 10:30 am   Run by DANET Ray of The Baby Whisperer Lactation Consultants   Go to The Baby Whisperer Lactation Consultants Facebook page, click on \"events\" for link:   Https://www.CGTrader.com/events/[email or web address]/    [facility] [place] breastfeeding support community:  free, drop-in breastfeeding support facilitated by Certified Lactation Counselors, open  and  from 1 pm - 5 pm.  In [place]:  [facility] [place], [facility] [facility]:   guidingstar[place].org    [facility] Milk Hour,  at 2:30 pm    Run by DANTE Wills  Go to [facility] FB page and send message to get link   Https://www.CGTrader.com/healthfoundations/    Pal Gonzalez:  http://www.[facility].org/    [place] offers a Lactation Lounge every Friday 12pm - 1pm, run by Pal Ferreira Leader.  Sign up via link at " Figaro Systems/cbe-lactation   https://www.Figaro Systems/cbe-lactation    [facility] [www.[facility].com] is offering virtual support groups every Monday, 10:30 am - 12 pm, run by RN IBCLC: Https://www.facebook.com/events/[email or web address]/    Culturally-Specific Breastfeeding Support:     For Hmong Families:   The Hmong Breastfeeding Coalition is a wonderful support for [place] Hmong women who are breastfeeding.  They are best found on Facebook.    for Black families:    Gamma Basicscolate Milk Club:  http://www.Geoli.st Classifieds.Love Home Swap/chocolate-milk-club/  Email: [email or web address]@[email or web address]    R.O.S.E. = Reaching our Sisters Everywhere  Http://www.breastfeedingrose.org/    Club Mom breastfeeding support for Black mothers:  Contact Aime Gonzalez  Phone: [phone]   Email:  [email or web address].     Teresa Gamboa  Phone: [phone]   Email:  [email or web address]    Club Dad parent support for Black fathers:   Contact Braeden Osborne   Phone: [phone]   Email:  [email or web address]    For Native/Indigenous Families:    https://www.Quant the News.com/groups/nitsarahsing.gimashkikinaan     Additional Resources:  La Leche League is an international, nonprofit, nonsectarian organization offering information, education, and support to mothers who want to breast-feed their babies. Local groups offer phone help and monthly meetings. Visit AlloCure.org or SpotFodo.org and us the  Find local support  drop down menu or click on the  Resources  tab.    [place] Breastfeeding Resources: 9-779-370-BABY (2229) toll free    National Breastfeeding Help Line trained breastfeeding peer counselors can help answer common breast-feeding questions by phone. Monday-Friday: English/Honduran  [phone]- [phone] toll free, [phone] (TTY)    Virtual Breastfeeding Support:    During this time of isolation, breastfeeding families need even more community!  Here are some area organizations offering virtual support groups  "for breastfeeding:    [place] [facility] Support Group,  at 10:30 am   Run by DANTE Ray of The Baby Whisperer Lactation Consultants   Go to The Baby Whisperer Lactation Consultants Facebook page and click on \"events\" for link   https://www.Premier Diagnostics.com/events/[email or web address]/  [facility] Milk Hour,  at 2:30 pm    Run by DANTE Wills   Go to [facility] FB page and send message to get link   https://www.Premier Diagnostics.Your Style Unzipped/[employer] Mobile Central & Eastern Europefoundations/  [facility] holding virtual meetings the first Tuesday of each month, 8-9 pm, and the   Third Saturday, 10 - 11 am.  Go to [facility] [place] FB page; message to get link https://www.AvaLAN Wireless Systems/LLLofGoldenCarlos Enrique/?hc_location=[facility]  [place] offers a Lactation Lounge every Friday 12pm - 1pm, run by Nathalia Fontanez [facility] Leader   Sign up via link at https://www.Zhenai/cbe-lactation  [facility] is offering virtual support groups every Monday, 10:30 am - 12 pm, run by nurse IBCLC   Https://www.Premier Diagnostics.com/events/[email or web address]/    Prenatal Breastfeeding Classes:      BloomCeros is offering virtual breastfeeding and  care classes:  https://www.Zhenai/education-workshops  BirthEd childbirth and breastfeeding education offering virtual prenatal breastfeeding classes  https://www.Sendoriedmn.com/workshops    Preparing for your baby:     [place] Screening Program  Http://www.health.[facility].mn.us/newbornscreening/  [place] newborns are tested soon after birth for more than 50 hidden, rare disorders, including hearing loss and critical congenital heart disease (CCHD). This site provides resources and information for families and providers.    When to call:   Appointment line and to get a hold of CNM in clinic Monday-Friday 8 am - 5 pm:  [phone].  There are some clinics with early start times (1st appointment 7:40 " am) and others with evening hours (last appointment 6:20 pm).  Most are typically open from 8 am to 5 pm.    CNM on call answering service: [phone].  Specify your hospital of choice and leave a brief message for CNM;  will then page CNM who is on call at your specified hospital and you should receive a call back with 15 minutes.  Be sure that your ringer is audible and that you can accept blocked calls so that we can get back in touch with you! This number should be reserved for urgent needs if during the day, before 8 am, after 5 pm, weekends, holidays.    Contact the on-call CNM with warning signs, such as:  vaginal bleeding   Vaginal discharge and itching or pain and burning during urination  Leg/calf pain or swelling on one side  severe abdominal pain  nausea and vomiting more than 4-5 times a day, or if you are unable to keep anything down  fever more than 100.4 degrees F.     Make plans for transportation and  as needed for when you are going to the hospital.    Ask your health care provider about vaccinations you may need following delivery. By now, you should have received a Tdap immunization to protect against pertussis or whooping cough. Fathers and family members who will be in close contact with the baby should also receive a Tdap shot at least two weeks before the expected birth of the baby if they have not had a Td (tetanus) shot for at least two years.    Tell your midwife or physician how you plan to feed your baby (breast or bottle), who you have chosen to do pediatric care for your baby, and if you have a boy, whether you have chosen to have him circumcised. You will need a car seat correctly installed in your vehicle to bring your baby home. As you start to set up the nursery at home for your baby, make sure the crib is safe. The mattress needs to fit snugly against the edges of the crib. If you can fit a soda can between the bars, they are too far apart and can allow the  baby's head to caught between them.    Learn about infant care and feeding, including information about infant CPR. We recommend that you put your baby to sleep on his or her back to reduce the chance of Sudden Infant Death Syndrome (SIDS). To maintain a healthy environment in which your child can grow, it's best to keep your home smoke-free. By preparing ahead, your transition into parenthood will go smoothly for you and your baby.    Your midwife will want to see you for a checkup 2 to 6 weeks after delivery.

## 2024-04-03 NOTE — PROGRESS NOTES
Indigo is here with Austyn for her routine prenatal appt at 38w5d gestation. Has been having contractions this afternoon, approx Q3-8 min lasting 35 sec for last two hours. Would like a cervical exam.  Feeling baby move. Denies change in vaginal discharge or leaking of fluid. Does not want to schedule IOL at this time, hoping this is early labor and will not need IOL. See flowsheet for cervical exam, vertex. BPP 8/8 today with normal AF.    Reviewed how to reach CNM with non-urgent and urgent concerns between visits. Advised to make appt in 1 weeks. Disc IOL next week if no active labor before then.

## 2024-04-09 NOTE — PROGRESS NOTES
Indigo Bright is here for SONJA at 39w4d. Here with Austyn. Ready for baby's arrival, hoping it would happen soon. At last week's visit she was having irregular contractions. She repots the ctxs fizzled out by Thursday morning. Did lose a bit of the mucous plug.  Fetal movement is active. No bleeding or watery leaking.  BPP today: report not yet available at time of visit. Patient reports she was told it was normal with score of 8/8.  Reviewed delivery recommendations for the following Maternal Indication: Pre-pregnant BMI >40. Ongoing discussions in past prenatal visits reviewed. After discussion of risks associated with waiting longer versus risks associated with induction, in consideration of cervical readiness based on last clinic's exam (3cm/50%/-2/soft with claudio of 6 without knowing position) and in consideration of the patient's values and goals for their labor and birth, will plan for continued antepartum surveillance next week as she awaits spontaneous labor. She desires induction next Wednesday on 24 if no labor before then. We discussed methods of induction with a ripe cervix including pitocin and AROM in consideration of the fetal station and GBS status and need for adequate prophylaxis. Advised a plan would be discussed further based on her exam on that day.  Induction scheduled at [facility] on 24 at 0730 in the morning. Advised patient to call the unit 1 hour prior to planned arrival time. Phone number provided. On-call CNM for that day notified.   TW.443 kg (12 lb), and appropriate.  Uterine size appropriate for dates.  Advised on when to call.

## 2024-04-10 ENCOUNTER — DOCUMENTATION ONLY (OUTPATIENT)
Dept: MIDWIFE SERVICES | Facility: CLINIC | Age: 24
End: 2024-04-10

## 2024-04-10 ENCOUNTER — PRENATAL OFFICE VISIT (OUTPATIENT)
Dept: MIDWIFE SERVICES | Facility: CLINIC | Age: 24
End: 2024-04-10
Payer: COMMERCIAL

## 2024-04-10 ENCOUNTER — HOSPITAL ENCOUNTER (OUTPATIENT)
Dept: ULTRASOUND IMAGING | Facility: CLINIC | Age: 24
Discharge: HOME | End: 2024-04-10
Attending: MIDWIFE | Admitting: MIDWIFE
Payer: COMMERCIAL

## 2024-04-10 VITALS
WEIGHT: 249 LBS | DIASTOLIC BLOOD PRESSURE: 74 MMHG | BODY MASS INDEX: 44.82 KG/M2 | SYSTOLIC BLOOD PRESSURE: 132 MMHG | HEART RATE: 89 BPM

## 2024-04-10 DIAGNOSIS — E66.01 MORBID OBESITY (H): ICD-10-CM

## 2024-04-10 DIAGNOSIS — O09.93 SUPERVISION OF HIGH RISK PREGNANCY IN THIRD TRIMESTER: Primary | ICD-10-CM

## 2024-04-10 DIAGNOSIS — R82.71 GBS BACTERIURIA: ICD-10-CM

## 2024-04-10 PROCEDURE — 76819 FETAL BIOPHYS PROFIL W/O NST: CPT

## 2024-04-10 PROCEDURE — 59426 ANTEPARTUM CARE ONLY: CPT | Performed by: ADVANCED PRACTICE MIDWIFE

## 2024-04-11 ENCOUNTER — ANESTHESIA EVENT (OUTPATIENT)
Dept: OBGYN | Facility: CLINIC | Age: 24
End: 2024-04-11
Payer: COMMERCIAL

## 2024-04-11 ENCOUNTER — HOSPITAL ENCOUNTER (INPATIENT)
Facility: CLINIC | Age: 24
LOS: 2 days | Discharge: HOME HEALTH | End: 2024-04-13
Attending: MIDWIFE | Admitting: MIDWIFE
Payer: COMMERCIAL

## 2024-04-11 ENCOUNTER — ANESTHESIA (OUTPATIENT)
Dept: OBGYN | Facility: CLINIC | Age: 24
End: 2024-04-11
Payer: COMMERCIAL

## 2024-04-11 ENCOUNTER — TELEPHONE (OUTPATIENT)
Dept: MIDWIFE SERVICES | Facility: CLINIC | Age: 24
End: 2024-04-11
Payer: COMMERCIAL

## 2024-04-11 DIAGNOSIS — Z37.9 VACUUM-ASSISTED VAGINAL DELIVERY: ICD-10-CM

## 2024-04-11 PROBLEM — O47.9 UTERINE CONTRACTIONS: Status: ACTIVE | Noted: 2024-04-11

## 2024-04-11 PROBLEM — Z36.89 ENCOUNTER FOR TRIAGE IN PREGNANT PATIENT: Status: ACTIVE | Noted: 2024-04-11

## 2024-04-11 PROBLEM — Z34.90 PREGNANCY: Status: ACTIVE | Noted: 2024-04-11

## 2024-04-11 LAB
ABO/RH(D): NORMAL
ALBUMIN MFR UR ELPH: 28.7 MG/DL
ALBUMIN MFR UR ELPH: <6 MG/DL
ALBUMIN SERPL BCG-MCNC: 3.1 G/DL (ref 3.5–5.2)
ALBUMIN SERPL BCG-MCNC: 3.5 G/DL (ref 3.5–5.2)
ALP SERPL-CCNC: 613 U/L (ref 40–150)
ALP SERPL-CCNC: 715 U/L (ref 40–150)
ALT SERPL W P-5'-P-CCNC: 7 U/L (ref 0–50)
ALT SERPL W P-5'-P-CCNC: 8 U/L (ref 0–50)
ANION GAP SERPL CALCULATED.3IONS-SCNC: 13 MMOL/L (ref 7–15)
ANION GAP SERPL CALCULATED.3IONS-SCNC: 14 MMOL/L (ref 7–15)
ANTIBODY SCREEN: NEGATIVE
APTT PPP: 25 SECONDS (ref 22–38)
AST SERPL W P-5'-P-CCNC: 14 U/L (ref 0–45)
AST SERPL W P-5'-P-CCNC: 20 U/L (ref 0–45)
BILIRUB SERPL-MCNC: 0.2 MG/DL
BILIRUB SERPL-MCNC: 0.3 MG/DL
BUN SERPL-MCNC: 8 MG/DL (ref 6–20)
BUN SERPL-MCNC: 8.4 MG/DL (ref 6–20)
CALCIUM SERPL-MCNC: 8.9 MG/DL (ref 8.6–10)
CALCIUM SERPL-MCNC: 9.7 MG/DL (ref 8.6–10)
CHLORIDE SERPL-SCNC: 102 MMOL/L (ref 98–107)
CHLORIDE SERPL-SCNC: 103 MMOL/L (ref 98–107)
CREAT SERPL-MCNC: 0.63 MG/DL (ref 0.51–0.95)
CREAT SERPL-MCNC: 0.64 MG/DL (ref 0.51–0.95)
CREAT UR-MCNC: 105 MG/DL
CREAT UR-MCNC: 27.4 MG/DL
DEPRECATED HCO3 PLAS-SCNC: 17 MMOL/L (ref 22–29)
DEPRECATED HCO3 PLAS-SCNC: 20 MMOL/L (ref 22–29)
EGFRCR SERPLBLD CKD-EPI 2021: >90 ML/MIN/1.73M2
EGFRCR SERPLBLD CKD-EPI 2021: >90 ML/MIN/1.73M2
ERYTHROCYTE [DISTWIDTH] IN BLOOD BY AUTOMATED COUNT: 13.6 % (ref 10–15)
ERYTHROCYTE [DISTWIDTH] IN BLOOD BY AUTOMATED COUNT: 13.7 % (ref 10–15)
GLUCOSE SERPL-MCNC: 140 MG/DL (ref 70–99)
GLUCOSE SERPL-MCNC: 89 MG/DL (ref 70–99)
HCT VFR BLD AUTO: 32.5 % (ref 35–47)
HCT VFR BLD AUTO: 35.1 % (ref 35–47)
HGB BLD-MCNC: 11.2 G/DL (ref 11.7–15.7)
HGB BLD-MCNC: 12.1 G/DL (ref 11.7–15.7)
HGB BLD-MCNC: 13.1 G/DL (ref 11.7–15.7)
INR PPP: 0.88 (ref 0.85–1.15)
MCH RBC QN AUTO: 29.1 PG (ref 26.5–33)
MCH RBC QN AUTO: 29.3 PG (ref 26.5–33)
MCHC RBC AUTO-ENTMCNC: 34.5 G/DL (ref 31.5–36.5)
MCHC RBC AUTO-ENTMCNC: 34.5 G/DL (ref 31.5–36.5)
MCV RBC AUTO: 84 FL (ref 78–100)
MCV RBC AUTO: 85 FL (ref 78–100)
PLATELET # BLD AUTO: 236 10E3/UL (ref 150–450)
PLATELET # BLD AUTO: 249 10E3/UL (ref 150–450)
POTASSIUM SERPL-SCNC: 4 MMOL/L (ref 3.4–5.3)
POTASSIUM SERPL-SCNC: 4.4 MMOL/L (ref 3.4–5.3)
PROT SERPL-MCNC: 6 G/DL (ref 6.4–8.3)
PROT SERPL-MCNC: 6.8 G/DL (ref 6.4–8.3)
PROT/CREAT 24H UR: 0.27 MG/MG CR (ref 0–0.2)
PROT/CREAT 24H UR: NORMAL MG/G{CREAT}
RBC # BLD AUTO: 3.85 10E6/UL (ref 3.8–5.2)
RBC # BLD AUTO: 4.13 10E6/UL (ref 3.8–5.2)
SODIUM SERPL-SCNC: 134 MMOL/L (ref 135–145)
SODIUM SERPL-SCNC: 135 MMOL/L (ref 135–145)
SPECIMEN EXPIRATION DATE: NORMAL
T PALLIDUM AB SER QL: NONREACTIVE
WBC # BLD AUTO: 17.8 10E3/UL (ref 4–11)
WBC # BLD AUTO: 23.4 10E3/UL (ref 4–11)

## 2024-04-11 PROCEDURE — 250N000009 HC RX 250: Performed by: MIDWIFE

## 2024-04-11 PROCEDURE — 250N000013 HC RX MED GY IP 250 OP 250 PS 637: Performed by: MIDWIFE

## 2024-04-11 PROCEDURE — 84450 TRANSFERASE (AST) (SGOT): CPT | Performed by: MIDWIFE

## 2024-04-11 PROCEDURE — 84156 ASSAY OF PROTEIN URINE: CPT | Performed by: MIDWIFE

## 2024-04-11 PROCEDURE — 85027 COMPLETE CBC AUTOMATED: CPT | Performed by: MIDWIFE

## 2024-04-11 PROCEDURE — 722N000001 HC LABOR CARE VAGINAL DELIVERY SINGLE

## 2024-04-11 PROCEDURE — 36415 COLL VENOUS BLD VENIPUNCTURE: CPT | Performed by: MIDWIFE

## 2024-04-11 PROCEDURE — 86780 TREPONEMA PALLIDUM: CPT | Performed by: MIDWIFE

## 2024-04-11 PROCEDURE — 85610 PROTHROMBIN TIME: CPT | Performed by: MIDWIFE

## 2024-04-11 PROCEDURE — 85730 THROMBOPLASTIN TIME PARTIAL: CPT | Performed by: MIDWIFE

## 2024-04-11 PROCEDURE — 86900 BLOOD TYPING SEROLOGIC ABO: CPT | Performed by: MIDWIFE

## 2024-04-11 PROCEDURE — 258N000003 HC RX IP 258 OP 636: Performed by: ANESTHESIOLOGY

## 2024-04-11 PROCEDURE — 250N000009 HC RX 250: Performed by: ANESTHESIOLOGY

## 2024-04-11 PROCEDURE — 3E0R3BZ INTRODUCTION OF ANESTHETIC AGENT INTO SPINAL CANAL, PERCUTANEOUS APPROACH: ICD-10-PCS | Performed by: ANESTHESIOLOGY

## 2024-04-11 PROCEDURE — 370N000003 HC ANESTHESIA WARD SERVICE: Performed by: ANESTHESIOLOGY

## 2024-04-11 PROCEDURE — 999N000016 HC STATISTIC ATTENDANCE AT DELIVERY

## 2024-04-11 PROCEDURE — 85018 HEMOGLOBIN: CPT | Performed by: MIDWIFE

## 2024-04-11 PROCEDURE — 80053 COMPREHEN METABOLIC PANEL: CPT | Performed by: MIDWIFE

## 2024-04-11 PROCEDURE — 00HU33Z INSERTION OF INFUSION DEVICE INTO SPINAL CANAL, PERCUTANEOUS APPROACH: ICD-10-PCS | Performed by: ANESTHESIOLOGY

## 2024-04-11 PROCEDURE — 250N000011 HC RX IP 250 OP 636: Mod: JZ | Performed by: MIDWIFE

## 2024-04-11 PROCEDURE — 250N000011 HC RX IP 250 OP 636: Performed by: ANESTHESIOLOGY

## 2024-04-11 PROCEDURE — 120N000001 HC R&B MED SURG/OB

## 2024-04-11 PROCEDURE — 10907ZC DRAINAGE OF AMNIOTIC FLUID, THERAPEUTIC FROM PRODUCTS OF CONCEPTION, VIA NATURAL OR ARTIFICIAL OPENING: ICD-10-PCS | Performed by: MIDWIFE

## 2024-04-11 PROCEDURE — 84155 ASSAY OF PROTEIN SERUM: CPT | Performed by: MIDWIFE

## 2024-04-11 RX ORDER — OXYTOCIN/0.9 % SODIUM CHLORIDE 30/500 ML
100-340 PLASTIC BAG, INJECTION (ML) INTRAVENOUS CONTINUOUS PRN
Status: DISCONTINUED | OUTPATIENT
Start: 2024-04-11 | End: 2024-04-13 | Stop reason: HOSPADM

## 2024-04-11 RX ORDER — OXYTOCIN 10 [USP'U]/ML
10 INJECTION, SOLUTION INTRAMUSCULAR; INTRAVENOUS
Status: DISCONTINUED | OUTPATIENT
Start: 2024-04-11 | End: 2024-04-13 | Stop reason: HOSPADM

## 2024-04-11 RX ORDER — LIDOCAINE 40 MG/G
CREAM TOPICAL
Status: DISCONTINUED | OUTPATIENT
Start: 2024-04-11 | End: 2024-04-11 | Stop reason: HOSPADM

## 2024-04-11 RX ORDER — LOPERAMIDE HCL 2 MG
4 CAPSULE ORAL
Status: DISCONTINUED | OUTPATIENT
Start: 2024-04-11 | End: 2024-04-13 | Stop reason: HOSPADM

## 2024-04-11 RX ORDER — NALBUPHINE HYDROCHLORIDE 20 MG/ML
2.5-5 INJECTION, SOLUTION INTRAMUSCULAR; INTRAVENOUS; SUBCUTANEOUS EVERY 6 HOURS PRN
Status: DISCONTINUED | OUTPATIENT
Start: 2024-04-11 | End: 2024-04-13 | Stop reason: HOSPADM

## 2024-04-11 RX ORDER — MISOPROSTOL 200 UG/1
800 TABLET ORAL
Status: DISCONTINUED | OUTPATIENT
Start: 2024-04-11 | End: 2024-04-11 | Stop reason: HOSPADM

## 2024-04-11 RX ORDER — KETOROLAC TROMETHAMINE 30 MG/ML
30 INJECTION, SOLUTION INTRAMUSCULAR; INTRAVENOUS
Status: DISCONTINUED | OUTPATIENT
Start: 2024-04-11 | End: 2024-04-13 | Stop reason: HOSPADM

## 2024-04-11 RX ORDER — PROCHLORPERAZINE 25 MG
25 SUPPOSITORY, RECTAL RECTAL EVERY 12 HOURS PRN
Status: DISCONTINUED | OUTPATIENT
Start: 2024-04-11 | End: 2024-04-11 | Stop reason: HOSPADM

## 2024-04-11 RX ORDER — NALOXONE HYDROCHLORIDE 0.4 MG/ML
0.4 INJECTION, SOLUTION INTRAMUSCULAR; INTRAVENOUS; SUBCUTANEOUS
Status: DISCONTINUED | OUTPATIENT
Start: 2024-04-11 | End: 2024-04-11 | Stop reason: HOSPADM

## 2024-04-11 RX ORDER — CITRIC ACID/SODIUM CITRATE 334-500MG
30 SOLUTION, ORAL ORAL
Status: DISCONTINUED | OUTPATIENT
Start: 2024-04-11 | End: 2024-04-11 | Stop reason: HOSPADM

## 2024-04-11 RX ORDER — PENICILLIN G 3000000 [IU]/50ML
3 INJECTION, SOLUTION INTRAVENOUS EVERY 4 HOURS
Status: DISCONTINUED | OUTPATIENT
Start: 2024-04-11 | End: 2024-04-11 | Stop reason: HOSPADM

## 2024-04-11 RX ORDER — ONDANSETRON 2 MG/ML
4 INJECTION INTRAMUSCULAR; INTRAVENOUS EVERY 6 HOURS PRN
Status: DISCONTINUED | OUTPATIENT
Start: 2024-04-11 | End: 2024-04-11 | Stop reason: HOSPADM

## 2024-04-11 RX ORDER — NALOXONE HYDROCHLORIDE 0.4 MG/ML
0.2 INJECTION, SOLUTION INTRAMUSCULAR; INTRAVENOUS; SUBCUTANEOUS
Status: DISCONTINUED | OUTPATIENT
Start: 2024-04-11 | End: 2024-04-11 | Stop reason: HOSPADM

## 2024-04-11 RX ORDER — IBUPROFEN 800 MG/1
800 TABLET, FILM COATED ORAL EVERY 6 HOURS PRN
Status: DISCONTINUED | OUTPATIENT
Start: 2024-04-11 | End: 2024-04-13 | Stop reason: HOSPADM

## 2024-04-11 RX ORDER — MISOPROSTOL 200 UG/1
400 TABLET ORAL
Status: DISCONTINUED | OUTPATIENT
Start: 2024-04-11 | End: 2024-04-11 | Stop reason: HOSPADM

## 2024-04-11 RX ORDER — LIDOCAINE HYDROCHLORIDE AND EPINEPHRINE 15; 5 MG/ML; UG/ML
3 INJECTION, SOLUTION EPIDURAL
Status: DISCONTINUED | OUTPATIENT
Start: 2024-04-11 | End: 2024-04-11 | Stop reason: HOSPADM

## 2024-04-11 RX ORDER — EPHEDRINE SULFATE 50 MG/ML
5 INJECTION, SOLUTION INTRAMUSCULAR; INTRAVENOUS; SUBCUTANEOUS
Status: DISCONTINUED | OUTPATIENT
Start: 2024-04-11 | End: 2024-04-11 | Stop reason: HOSPADM

## 2024-04-11 RX ORDER — LIDOCAINE HYDROCHLORIDE AND EPINEPHRINE 15; 5 MG/ML; UG/ML
INJECTION, SOLUTION EPIDURAL PRN
Status: DISCONTINUED | OUTPATIENT
Start: 2024-04-11 | End: 2024-04-11

## 2024-04-11 RX ORDER — DOCUSATE SODIUM 100 MG/1
100 CAPSULE, LIQUID FILLED ORAL DAILY
Status: DISCONTINUED | OUTPATIENT
Start: 2024-04-11 | End: 2024-04-13 | Stop reason: HOSPADM

## 2024-04-11 RX ORDER — OXYTOCIN/0.9 % SODIUM CHLORIDE 30/500 ML
340 PLASTIC BAG, INJECTION (ML) INTRAVENOUS CONTINUOUS PRN
Status: DISCONTINUED | OUTPATIENT
Start: 2024-04-11 | End: 2024-04-11 | Stop reason: HOSPADM

## 2024-04-11 RX ORDER — METHYLERGONOVINE MALEATE 0.2 MG/ML
200 INJECTION INTRAVENOUS
Status: DISCONTINUED | OUTPATIENT
Start: 2024-04-11 | End: 2024-04-13 | Stop reason: HOSPADM

## 2024-04-11 RX ORDER — ONDANSETRON 4 MG/1
4 TABLET, ORALLY DISINTEGRATING ORAL EVERY 6 HOURS PRN
Status: DISCONTINUED | OUTPATIENT
Start: 2024-04-11 | End: 2024-04-11 | Stop reason: HOSPADM

## 2024-04-11 RX ORDER — ACETAMINOPHEN 325 MG/1
650 TABLET ORAL EVERY 4 HOURS PRN
Status: DISCONTINUED | OUTPATIENT
Start: 2024-04-11 | End: 2024-04-13 | Stop reason: HOSPADM

## 2024-04-11 RX ORDER — MODIFIED LANOLIN
OINTMENT (GRAM) TOPICAL
Status: DISCONTINUED | OUTPATIENT
Start: 2024-04-11 | End: 2024-04-13 | Stop reason: HOSPADM

## 2024-04-11 RX ORDER — METHYLERGONOVINE MALEATE 0.2 MG/ML
200 INJECTION INTRAVENOUS
Status: DISCONTINUED | OUTPATIENT
Start: 2024-04-11 | End: 2024-04-11 | Stop reason: HOSPADM

## 2024-04-11 RX ORDER — IBUPROFEN 800 MG/1
800 TABLET, FILM COATED ORAL
Status: DISCONTINUED | OUTPATIENT
Start: 2024-04-11 | End: 2024-04-13 | Stop reason: HOSPADM

## 2024-04-11 RX ORDER — HYDROCORTISONE 25 MG/G
CREAM TOPICAL 3 TIMES DAILY PRN
Status: DISCONTINUED | OUTPATIENT
Start: 2024-04-11 | End: 2024-04-13 | Stop reason: HOSPADM

## 2024-04-11 RX ORDER — OXYTOCIN 10 [USP'U]/ML
10 INJECTION, SOLUTION INTRAMUSCULAR; INTRAVENOUS
Status: DISCONTINUED | OUTPATIENT
Start: 2024-04-11 | End: 2024-04-11 | Stop reason: HOSPADM

## 2024-04-11 RX ORDER — LOPERAMIDE HCL 2 MG
4 CAPSULE ORAL
Status: DISCONTINUED | OUTPATIENT
Start: 2024-04-11 | End: 2024-04-11 | Stop reason: HOSPADM

## 2024-04-11 RX ORDER — PENICILLIN G POTASSIUM 5000000 [IU]/1
5 INJECTION, POWDER, FOR SOLUTION INTRAMUSCULAR; INTRAVENOUS ONCE
Qty: 5 MILLION UNITS | Refills: 0 | Status: COMPLETED | OUTPATIENT
Start: 2024-04-11 | End: 2024-04-11

## 2024-04-11 RX ORDER — TRANEXAMIC ACID 10 MG/ML
1 INJECTION, SOLUTION INTRAVENOUS EVERY 30 MIN PRN
Status: DISCONTINUED | OUTPATIENT
Start: 2024-04-11 | End: 2024-04-11 | Stop reason: HOSPADM

## 2024-04-11 RX ORDER — LOPERAMIDE HCL 2 MG
2 CAPSULE ORAL
Status: DISCONTINUED | OUTPATIENT
Start: 2024-04-11 | End: 2024-04-11 | Stop reason: HOSPADM

## 2024-04-11 RX ORDER — ENOXAPARIN SODIUM 100 MG/ML
40 INJECTION SUBCUTANEOUS EVERY 12 HOURS
Status: DISCONTINUED | OUTPATIENT
Start: 2024-04-12 | End: 2024-04-13 | Stop reason: HOSPADM

## 2024-04-11 RX ORDER — METOCLOPRAMIDE HYDROCHLORIDE 5 MG/ML
10 INJECTION INTRAMUSCULAR; INTRAVENOUS EVERY 6 HOURS PRN
Status: DISCONTINUED | OUTPATIENT
Start: 2024-04-11 | End: 2024-04-11 | Stop reason: HOSPADM

## 2024-04-11 RX ORDER — CARBOPROST TROMETHAMINE 250 UG/ML
250 INJECTION, SOLUTION INTRAMUSCULAR
Status: DISCONTINUED | OUTPATIENT
Start: 2024-04-11 | End: 2024-04-13 | Stop reason: HOSPADM

## 2024-04-11 RX ORDER — OXYTOCIN/0.9 % SODIUM CHLORIDE 30/500 ML
340 PLASTIC BAG, INJECTION (ML) INTRAVENOUS CONTINUOUS PRN
Status: DISCONTINUED | OUTPATIENT
Start: 2024-04-11 | End: 2024-04-13 | Stop reason: HOSPADM

## 2024-04-11 RX ORDER — LOPERAMIDE HCL 2 MG
2 CAPSULE ORAL
Status: DISCONTINUED | OUTPATIENT
Start: 2024-04-11 | End: 2024-04-13 | Stop reason: HOSPADM

## 2024-04-11 RX ORDER — FENTANYL CITRATE 50 UG/ML
100 INJECTION, SOLUTION INTRAMUSCULAR; INTRAVENOUS
Status: DISCONTINUED | OUTPATIENT
Start: 2024-04-11 | End: 2024-04-11 | Stop reason: HOSPADM

## 2024-04-11 RX ORDER — TRANEXAMIC ACID 10 MG/ML
1 INJECTION, SOLUTION INTRAVENOUS EVERY 30 MIN PRN
Status: DISCONTINUED | OUTPATIENT
Start: 2024-04-11 | End: 2024-04-13 | Stop reason: HOSPADM

## 2024-04-11 RX ORDER — FENTANYL/ROPIVACAINE/NS/PF 2MCG/ML-.1
PLASTIC BAG, INJECTION (ML) EPIDURAL
Status: DISCONTINUED | OUTPATIENT
Start: 2024-04-11 | End: 2024-04-11 | Stop reason: HOSPADM

## 2024-04-11 RX ORDER — SODIUM CHLORIDE, SODIUM LACTATE, POTASSIUM CHLORIDE, CALCIUM CHLORIDE 600; 310; 30; 20 MG/100ML; MG/100ML; MG/100ML; MG/100ML
INJECTION, SOLUTION INTRAVENOUS CONTINUOUS PRN
Status: DISCONTINUED | OUTPATIENT
Start: 2024-04-11 | End: 2024-04-11 | Stop reason: HOSPADM

## 2024-04-11 RX ORDER — MISOPROSTOL 200 UG/1
800 TABLET ORAL
Status: DISCONTINUED | OUTPATIENT
Start: 2024-04-11 | End: 2024-04-13 | Stop reason: HOSPADM

## 2024-04-11 RX ORDER — CARBOPROST TROMETHAMINE 250 UG/ML
250 INJECTION, SOLUTION INTRAMUSCULAR
Status: DISCONTINUED | OUTPATIENT
Start: 2024-04-11 | End: 2024-04-11 | Stop reason: HOSPADM

## 2024-04-11 RX ORDER — TERBUTALINE SULFATE 1 MG/ML
0.25 INJECTION, SOLUTION SUBCUTANEOUS
Status: DISCONTINUED | OUTPATIENT
Start: 2024-04-11 | End: 2024-04-11 | Stop reason: HOSPADM

## 2024-04-11 RX ORDER — OXYTOCIN/0.9 % SODIUM CHLORIDE 30/500 ML
1-24 PLASTIC BAG, INJECTION (ML) INTRAVENOUS CONTINUOUS
Status: DISCONTINUED | OUTPATIENT
Start: 2024-04-11 | End: 2024-04-11 | Stop reason: HOSPADM

## 2024-04-11 RX ORDER — METOCLOPRAMIDE 10 MG/1
10 TABLET ORAL EVERY 6 HOURS PRN
Status: DISCONTINUED | OUTPATIENT
Start: 2024-04-11 | End: 2024-04-11 | Stop reason: HOSPADM

## 2024-04-11 RX ORDER — SODIUM CHLORIDE 9 MG/ML
INJECTION, SOLUTION INTRAVENOUS CONTINUOUS
Status: DISCONTINUED | OUTPATIENT
Start: 2024-04-11 | End: 2024-04-11 | Stop reason: HOSPADM

## 2024-04-11 RX ORDER — MISOPROSTOL 200 UG/1
400 TABLET ORAL
Status: DISCONTINUED | OUTPATIENT
Start: 2024-04-11 | End: 2024-04-13 | Stop reason: HOSPADM

## 2024-04-11 RX ORDER — BISACODYL 10 MG
10 SUPPOSITORY, RECTAL RECTAL DAILY PRN
Status: DISCONTINUED | OUTPATIENT
Start: 2024-04-11 | End: 2024-04-13 | Stop reason: HOSPADM

## 2024-04-11 RX ORDER — PROCHLORPERAZINE MALEATE 10 MG
10 TABLET ORAL EVERY 6 HOURS PRN
Status: DISCONTINUED | OUTPATIENT
Start: 2024-04-11 | End: 2024-04-11 | Stop reason: HOSPADM

## 2024-04-11 RX ORDER — LIDOCAINE HYDROCHLORIDE 10 MG/ML
INJECTION, SOLUTION EPIDURAL; INFILTRATION; INTRACAUDAL; PERINEURAL PRN
Status: DISCONTINUED | OUTPATIENT
Start: 2024-04-11 | End: 2024-04-11

## 2024-04-11 RX ADMIN — Medication 2 MILLI-UNITS/MIN: at 18:34

## 2024-04-11 RX ADMIN — ACETAMINOPHEN 650 MG: 325 TABLET ORAL at 22:35

## 2024-04-11 RX ADMIN — LIDOCAINE HYDROCHLORIDE,EPINEPHRINE BITARTRATE 2 ML: 15; .005 INJECTION, SOLUTION EPIDURAL; INFILTRATION; INTRACAUDAL; PERINEURAL at 12:16

## 2024-04-11 RX ADMIN — Medication 2 MILLI-UNITS/MIN: at 19:14

## 2024-04-11 RX ADMIN — Medication 340 ML/HR: at 19:52

## 2024-04-11 RX ADMIN — PENICILLIN G 3 MILLION UNITS: 3000000 INJECTION, SOLUTION INTRAVENOUS at 14:53

## 2024-04-11 RX ADMIN — SODIUM CHLORIDE, POTASSIUM CHLORIDE, SODIUM LACTATE AND CALCIUM CHLORIDE 500 ML: 600; 310; 30; 20 INJECTION, SOLUTION INTRAVENOUS at 18:24

## 2024-04-11 RX ADMIN — LIDOCAINE HYDROCHLORIDE 20 ML: 10 INJECTION, SOLUTION INFILTRATION; PERINEURAL at 19:55

## 2024-04-11 RX ADMIN — Medication: at 12:18

## 2024-04-11 RX ADMIN — PENICILLIN G POTASSIUM 5 MILLION UNITS: 5000000 POWDER, FOR SOLUTION INTRAMUSCULAR; INTRAPLEURAL; INTRATHECAL; INTRAVENOUS at 10:50

## 2024-04-11 RX ADMIN — KETOROLAC TROMETHAMINE 30 MG: 30 INJECTION, SOLUTION INTRAMUSCULAR at 20:35

## 2024-04-11 RX ADMIN — LIDOCAINE HYDROCHLORIDE 2 ML: 10 INJECTION, SOLUTION EPIDURAL; INFILTRATION; INTRACAUDAL; PERINEURAL at 12:16

## 2024-04-11 RX ADMIN — PENICILLIN G 3 MILLION UNITS: 3000000 INJECTION, SOLUTION INTRAVENOUS at 18:47

## 2024-04-11 ASSESSMENT — ACTIVITIES OF DAILY LIVING (ADL)
ADLS_ACUITY_SCORE: 20

## 2024-04-11 NOTE — PROGRESS NOTES
"Labor Progress Note:    Patient Name:  Indigo Bright  :      2000  MRN:      [id number]      Assessment:    SIUP at 39 6/7 weeks  Second stage of labor  Cat 2 FHT  ROM x 5 hours  Elevated BP's without evidence of preeclampsia      Plan:   Continue pushing as indicated by fetal tolerance and maternal tolerance.  Report provided to Darling Nassar CNM who assumes care at 18:00 hrs.  Anticipate NSVB.    Subjective:  Indigo Bright is struggling with significant pain in her low back on left side and left side buttock.  Pushing with each contraction with great effort.  Has tried right, left sidelying, pushing with aid of birthing bar and towel pull.  Currently on left side lying.  Anesthesia contacted to assist with pain management, epidural bolus provided by anesthesiology.  Indigo is pushing with great effort.  Cervix was found to be complete and +1 station at 16:40 when she was involuntarily pushing with contractions.  She began pushing at that time as she was unable to stop pushing.  Fetus has not moved significantly since initiating pushing, remains +1 station after 60 minutes of active maternal pushing. Caput noted to be forming on fetal head.  Difficulty monitoring FHT due to fetal position and maternal body habitus.  IFSE placed after patient consent to more accurately monitor FHT.       Objective:  /58   Temp 98.3  F (36.8  C) (Oral)   Resp 18   Ht 1.6 m (5' 3\")   Wt 112.9 kg (249 lb)   LMP 2023 (Exact Date)   SpO2 100%   BMI 44.11 kg/m      FHR: 150, moderate variability, variables with pushing to 100-110 with recovery to baseline with contraction cessation.    Uterine contractions: every 2 minutes, lasting 60 seconds.    SVE:  complete/+1 at 16:40            Complete/+1 at 17:40      Provider:  ATUL Stringer CNM    Date:  2024  Time:  5:31 PM    90 minutes on the date of the encounter doing chart review, review of test results, interpretation of tests, " patient visit, and documentation

## 2024-04-11 NOTE — PROGRESS NOTES
"Labor Progress Note:    Patient Name:  Indigo Bright  :      2000  MRN:      [id number]      Assessment:    SIUP at 39 6/7 weeks  Cat 1 FHT (short episode of Cat 2 resolved)  Epidural anesthesia  Active labor with cervical change  ROM x 2 hours  Elevated BP's isolated    Plan:   Will draw preeclampsia labs to r/o underlying disorder.  Continue to watch BP's.  Recheck cervix in 2 hours or PRN.  If no change, will recommend placing IUPC and consider pitocin as indicated.  Anticipate NSVB.    Subjective:  Indigo Bright is coping well with contractions.  Has rested on her right and left lateral.  Noting increased vaginal pressure and rectal pressure, breathing through contractions due to rectal pressure.  RN noted fetal membranes protruding from vagina.  CNM performed VE and revealed cervix to be 7/90%/0 station.  No BOW palpated, although membranes noted in vaginal vault.  No vaginal bleeding noted.  Patient had two elevated BP's noted:  143/78 (14:49), 141/88 (15:19).  Preeclampsia labs ordered to evaluate further.  Denies HA, visual changes or epigastric pressure.     Objective:  /58   Temp 98.2  F (36.8  C) (Oral)   Resp 18   Ht 1.6 m (5' 3\")   Wt 112.9 kg (249 lb)   LMP 2023 (Exact Date)   SpO2 100%   BMI 44.11 kg/m      FHR: 150, moderate variability, accels present, occasional early decels noted.  One period of <20 minutes noted some non-repetative variables that have resolved.     Uterine contractions: every 2-4 minutes. Some difficulty monitoring Uc's consistently due to maternal body habitus.  RN will continue to adjust toco.  If needed, may consider IUPC with next VE.     SVE:  7/80%/0      Provider: ATUL Stringer CNM    Date:  2024  Time:  3:28 PM    30 minutes on the date of the encounter doing chart review, review of outside records, interpretation of tests, patient visit, and documentation      "

## 2024-04-11 NOTE — ANESTHESIA PREPROCEDURE EVALUATION
"Anesthesia Pre-Procedure Evaluation    Patient: Indigo Bright   MRN: [id number] : 2000        Procedure :           Past Medical History:   Diagnosis Date    Hypertension 08/15/21      Past Surgical History:   Procedure Laterality Date    [place] TOOTH EXTRACTION        Allergies   Allergen Reactions    Seasonal Allergies Headache      Social History     Tobacco Use    Smoking status: Never     Passive exposure: Never    Smokeless tobacco: Never   Substance Use Topics    Alcohol use: Not Currently     Comment: Occasional drink with dinner but not more than once a month.      Wt Readings from Last 1 Encounters:   24 112.9 kg (249 lb)        Anesthesia Evaluation            ROS/MED HX  ENT/Pulmonary:  - neg pulmonary ROS     Neurologic: Comment: narolepsy      Cardiovascular:  - neg cardiovascular ROS     METS/Exercise Tolerance:     Hematologic:  - neg hematologic  ROS     Musculoskeletal:       GI/Hepatic:  - neg GI/hepatic ROS     Renal/Genitourinary:       Endo:     (+)               Obesity (morbid),       Psychiatric/Substance Use:  - neg psychiatric ROS     Infectious Disease:       Malignancy:       Other:            Physical Exam    Airway        Mallampati: III   TM distance: > 3 FB   Neck ROM: full   Mouth opening: > 3 cm    Respiratory Devices and Support         Dental  no notable dental history         Cardiovascular   cardiovascular exam normal          Pulmonary   pulmonary exam normal                OUTSIDE LABS:  CBC:   Lab Results   Component Value Date    WBC 11.9 (H) 2023    HGB 13.1 2024    HGB 11.5 (L) 2024    HCT 42.6 2023     2023     BMP:   Lab Results   Component Value Date     2022    POTASSIUM 3.9 2022    CHLORIDE 103 2022    CO2 27 2022    BUN 10.2 2022    CR 0.61 2022    GLC 79 10/31/2023    GLC 78 2022     COAGS: No results found for: \"PTT\", \"INR\", \"FIBR\"  POC: No results found " "for: \"BGM\", \"HCG\", \"HCGS\"  HEPATIC: No results found for: \"ALBUMIN\", \"PROTTOTAL\", \"ALT\", \"AST\", \"GGT\", \"ALKPHOS\", \"BILITOTAL\", \"BILIDIRECT\", \"JUAN ANTONIO\"  OTHER:   Lab Results   Component Value Date    A1C 5.1 08/23/2023    MENDEZ 9.7 07/22/2022    TSH 2.75 08/23/2023       Anesthesia Plan    ASA Status:  3       Anesthesia Type: Epidural.              Consents    Anesthesia Plan(s) and associated risks, benefits, and realistic alternatives discussed. Questions answered and patient/representative(s) expressed understanding.     - Discussed:     - Discussed with:  Patient, Spouse            Postoperative Care            Comments:           neg OB ROS.      Sanchez Ceron MD    I have reviewed the pertinent notes and labs in the chart from the past 30 days and (re)examined the patient.  Any updates or changes from those notes are reflected in this note.             # Drug Induced Platelet Defect: home medication list includes an antiplatelet medication      "

## 2024-04-11 NOTE — H&P
Labor & Delivery Admission Note:    Date: 2024  Time: 10:25 AM    Admission H&P  Indigo Bright,  2000, MRN [id number]    Encounter for triage in pregnant patient  Pregnancy    PCP: No Ref-Primary, Physician, None   Code status:  Full Code       Extended Emergency Contact Information  Primary Emergency Contact: Austyn Bright  Address: [address] [place] [address]  Mobile Phone: [phone]  Relation: Spouse       Chief Complaint: Uterine contractions       HPI:      Indigo Bright, is a 24 year old,  AT 39w6d, LMP 13, Estimated Date of Delivery: 2024, confirmed by 7 week US, admitted to [facility] on 2024 at 10:00 secondary to: onset of uterine contractions yesterday at 16:00 with contractions all night with increasing intensity this morning.  GBS positive.  Denies LOF, vaginal bleeding. Reports normal fetal movement.    Prenatal History:  Indigo Bright began care with [facility]wives at the  [facility] on 23 at 6 weeks gestation with regular care thereafter for a total of 12 visits. Her care was complicated by GBS bacturia, BMI >40.    Pre pregnant BMI: 41.99  TW lbs  BP in pregnancy: normotensive  Size:Dates relationship:S=D    Pertinent Labs:    Prenatal Office Visit on 2024   Component Date Value Ref Range Status    Hemoglobin 2024 11.5 (L)  11.7 - 15.7 g/dL Final    Hold Specimen 2024 JI   Final   Prenatal Office Visit on 2024   Component Date Value Ref Range Status    Color Urine 2024 Yellow  Colorless, Straw, Light Yellow, Yellow Final    Appearance Urine 2024 Clear  Clear Final    Glucose Urine 2024 Negative  Negative mg/dL Final    Bilirubin Urine 2024 Negative  Negative Final    Ketones Urine 2024 Negative  Negative mg/dL Final    Specific Gravity Urine 2024 1.015  1.005 - 1.030 Final    Blood Urine 2024  Negative  Negative Final    pH Urine 02/21/2024 7.0  5.0 - 8.0 Final    Protein Albumin Urine 02/21/2024 Negative  Negative mg/dL Final    Urobilinogen Urine 02/21/2024 0.2  0.2, 1.0 E.U./dL Final    Nitrite Urine 02/21/2024 Negative  Negative Final    Leukocyte Esterase Urine 02/21/2024 Negative  Negative Final    Culture 02/21/2024 <1,000 CFU/mL Streptococcus agalactiae (Group B Streptococcus) (A)   Final    Comment: This organism is susceptible to ampicillin, penicillin, vancomycin and the cephalosporins. If treatment is required AND your patient is allergic to penicillin, contact the Microbiology Lab within 5 days to request susceptibility testing.  This organi                           sm may be significant in OB patients, however, it is part of the normal urogenital rachid.    Culture 02/21/2024 <10,000 CFU/mL Mixture of urogenital rachid   Final   Admission on 02/08/2024, Discharged on 02/08/2024   Component Date Value Ref Range Status    Color Urine 02/08/2024 Yellow  Colorless, Straw, Light Yellow, Yellow Final    Appearance Urine 02/08/2024 Clear  Clear Final    Glucose Urine 02/08/2024 Negative  Negative mg/dL Final    Bilirubin Urine 02/08/2024 Negative  Negative Final    Ketones Urine 02/08/2024 60 (A)  Negative mg/dL Final    Specific Gravity Urine 02/08/2024 1.024  1.001 - 1.030 Final    Blood Urine 02/08/2024 Negative  Negative Final    pH Urine 02/08/2024 6.5  5.0 - 7.0 Final    Protein Albumin Urine 02/08/2024 20 (A)  Negative mg/dL Final    Urobilinogen Urine 02/08/2024 <2.0  <2.0 mg/dL Final    Nitrite Urine 02/08/2024 Negative  Negative Final    Leukocyte Esterase Urine 02/08/2024 75 Tamir/uL (A)  Negative Final    Bacteria Urine 02/08/2024 Few (A)  None Seen /HPF Final    Mucus Urine 02/08/2024 Present (A)  None Seen /LPF Final    RBC Urine 02/08/2024 2  <=2 /HPF Final    WBC Urine 02/08/2024 7 (H)  <=5 /HPF Final    Squamous Epithelials Urine 02/08/2024 7 (H)  <=1 /HPF Final    Trichomonas  02/08/2024 Absent  Absent Final    Yeast 02/08/2024 Absent  Absent Final    Clue Cells 02/08/2024 Absent  Absent Final    WBCs/high power field 02/08/2024 4+ (A)  None Final    Culture 02/08/2024 <10,000 CFU/mL Streptococcus agalactiae (Group B Streptococcus) (A)   Final    Comment: This organism is susceptible to ampicillin, penicillin, vancomycin and the cephalosporins. If treatment is required AND your patient is allergic to penicillin, contact the Microbiology Lab within 5 days to request susceptibility testing.  This organi                           sm may be significant in OB patients, however, it is part of the normal urogenital rachid.    Culture 02/08/2024 50,000-100,000 CFU/mL Mixture of urogenital rachid   Final   Prenatal Office Visit on 12/27/2023   Component Date Value Ref Range Status    Glu Gest Screen 1hr 50g 12/27/2023 123  70 - 129 mg/dL Final    Hemoglobin 12/27/2023 11.0 (L)  11.7 - 15.7 g/dL Final    Hold Specimen 12/27/2023 JIC   Final   Prenatal Office Visit on 10/31/2023   Component Date Value Ref Range Status    Glucose 10/31/2023 79  70 - 99 mg/dL Final    Patient Fasting > 8hrs? 10/31/2023 No   Final    Cholesterol 10/31/2023 215 (H)  <200 mg/dL Final    Triglycerides 10/31/2023 158 (H)  <150 mg/dL Final    Direct Measure HDL 10/31/2023 70  >=50 mg/dL Final    LDL Cholesterol Calculated 10/31/2023 113 (H)  <=100 mg/dL Final    Non HDL Cholesterol 10/31/2023 145 (H)  <130 mg/dL Final   Prenatal Office Visit on 08/23/2023   Component Date Value Ref Range Status    ABO/RH(D) 08/23/2023 A POS   Final    SPECIMEN EXPIRATION DATE 08/23/2023 20230826235900   Final    Antibody Screen 08/23/2023 Negative  Negative Final    SPECIMEN EXPIRATION DATE 08/23/2023 [id number]   Final    HIV Antigen Antibody Combo 08/23/2023 Nonreactive  Nonreactive Final    HIV-1 p24 Ag & HIV-1/HIV-2 Ab Not Detected    Hepatitis B Surface Antigen 08/23/2023 Nonreactive  Nonreactive Final    Hepatitis C Antibody  2023 Nonreactive  Nonreactive Final    Rubella Hina IgG Instrument Value 2023 1.98  <0.90 Index Final    Rubella Antibody IgG 2023 Positive   Final    Suggests previous exposure or immunization and probable immunity.    Culture 2023 10,000-50,000 CFU/mL Mixture of urogenital rachid   Final    Treponema Antibody Total 2023 Nonreactive  Nonreactive Final    Hemoglobin A1C 2023 5.1  <5.7 % Final    Normal <5.7%   Prediabetes 5.7-6.4%    Diabetes 6.5% or higher     Note: Adopted from ADA consensus guidelines.    TSH 2023 2.75  0.30 - 4.20 uIU/mL Final    WBC Count 2023 11.9 (H)  4.0 - 11.0 10e3/uL Final    RBC Count 2023 4.93  3.80 - 5.20 10e6/uL Final    Hemoglobin 2023 13.9  11.7 - 15.7 g/dL Final    Hematocrit 2023 42.6  35.0 - 47.0 % Final    MCV 2023 86  78 - 100 fL Final    MCH 2023 28.2  26.5 - 33.0 pg Final    MCHC 2023 32.6  31.5 - 36.5 g/dL Final    RDW 2023 12.8  10.0 - 15.0 % Final    Platelet Count 2023 294  150 - 450 10e3/uL Final    % Neutrophils 2023 63  % Final    % Lymphocytes 2023 23  % Final    % Monocytes 2023 9  % Final    % Eosinophils 2023 4  % Final    % Basophils 2023 1  % Final    % Immature Granulocytes 2023 0  % Final    NRBCs per 100 WBC 2023 0  <1 /100 Final    Absolute Neutrophils 2023 7.5  1.6 - 8.3 10e3/uL Final    Absolute Lymphocytes 2023 2.7  0.8 - 5.3 10e3/uL Final    Absolute Monocytes 2023 1.0  0.0 - 1.3 10e3/uL Final    Absolute Eosinophils 2023 0.5  0.0 - 0.7 10e3/uL Final    Absolute Basophils 2023 0.1  0.0 - 0.2 10e3/uL Final    Absolute Immature Granulocytes 2023 0.0  <=0.4 10e3/uL Final    Absolute NRBCs 2023 0.0  10e3/uL Final       Pertinent Radiology:  See prenatal record    OB History  OB History    Para Term  AB Living   1 0 0 0 0 0   SAB IAB Ectopic Multiple Live Births   0 0 0 0  "0      # Outcome Date GA Lbr Delvis/2nd Weight Sex Type Anes PTL Lv   1 Current                [facility]  [facility]un[facility]     Surgical History  She  has a past surgical history that includes [place] Tooth Extraction.       Social History  Reviewed, and she  reports that she has never smoked. She has never been exposed to tobacco smoke. She has never used smokeless tobacco. She reports that she does not currently use alcohol. She reports that she does not use drugs.        Family History  Reviewed, and family history includes Asthma in her mother; Cerebrovascular Disease in her paternal grandmother; Depression in her sister; Hypertension in her father, maternal grandmother, mother, and paternal grandmother; Obesity in her father, maternal grandmother, and mother; Other Cancer in her maternal grandfather and paternal grandmother; Thyroid Disease in her paternal grandmother.   Psychosocial Needs  Social History     Social History Narrative    Not on file     Additional psychosocial needs reviewed per nursing assessment.       Allergies   Allergen Reactions    Seasonal Allergies Headache      Medications Prior to Admission   Medication Sig Dispense Refill Last Dose    aspirin (ASPIRIN LOW DOSE) 81 MG EC tablet Take 81 mg by mouth daily   4/10/2024    Ferrous Sulfate (IRON PO) Take 1 tablet by mouth daily   Past Week    loratadine (CLARITIN) 10 MG tablet    4/10/2024    Prenatal Vit-Fe Fumarate-FA (PRENATAL PO)    4/10/2024        Review of Systems:  Pertinent items are noted in HPI.   Physical Exam:  Pulse:  [89] 89  BP: (132)/(74) 132/74    LMP 06/12/2023 (Exact Date)     Height: 5'3\"  General Appearance: No acute distress, working with contractions, breathing through them  Psych: A&O x 3  Skin: intact  CV: RRR, S1, S2, no extra sounds or murmurs  Resp: breath sounds CTA bilaterally, anterior and posterior  Abd: soft, non tender, gravid  Leopolds: Cephalic  EFW: 8 lbs      Membrane Status: intact   "   Cervical Exam: Per RN 4-5/50-60%/-2   Fetal Heart Rate: 140, moderate variability, accels present, no decels noted   Uterine Activity: Every 4 minutes, x 45 seconds, moderate to palpation               Notable AP/IP factors to date:  1.)BMI >40  2.)GBS bacteruria   3.)Desires epidural    Impression:  Indigo Bright is a 24 year old year old at 39w6d weeks  Early labor  GBS positive  Cat 1 FHT  No C/I to epidural use      Plan:  Admit to Birthplace.  Place IV and initiated GBS abx as well as IVF for placement of epidural ASAP. Reviewed birth plan with patient and .  Anticipate NSVB.  35 minutes on the date of the encounter doing chart review, review of test results, interpretation of tests, and patient visit, documentation.    Provider:ATUL Stringer CNM    Date: 2024  Time: 10:25 AM    Indigo Bright,  2000, MRN [id number]

## 2024-04-11 NOTE — PROVIDER NOTIFICATION
RN notified that she is taking over RN care for patient. Patient now in room 254. Patient has requested labor epidural and MDA has been notified. CNMW states will be up soon.

## 2024-04-11 NOTE — ANESTHESIA PROCEDURE NOTES
"Epidural catheter Procedure Note    Pre-Procedure   Staff -        Anesthesiologist:  Sanchez Ceron MD       Performed By: anesthesiologist       Location: OB       Procedure Start/Stop Times: 4/11/2024 12:00 PM and 4/11/2024 12:18 PM       Pre-Anesthestic Checklist: patient identified, IV checked, risks and benefits discussed, informed consent, monitors and equipment checked, pre-op evaluation, at physician/surgeon's request and post-op pain management  Timeout:       Correct Patient: Yes        Correct Procedure: Yes        Correct Site: Yes        Correct Position: Yes   Procedure Documentation  Procedure: epidural catheter       Patient Position: sitting       Patient Prep/Sterile Barriers: sterile gloves, patient draped       Skin prep: Chloraprep       Local skin infiltrated with mL of 1% lidocaine.        Insertion Site: L3-4. (midline approach).       Technique: LORT saline        DEBBIE at 8 cm.       Needle Type: ToFitnety needle       Needle Gauge: 18.        Needle Length (Inches): 3.5        Catheter: 20 G.          Catheter threaded easily.         5 cm epidural space.         Threaded 13 cm at skin.         # of attempts: 2 and  # of redirects:     Assessment/Narrative         Paresthesias: No.       Test dose of 3 mL lidocaine 1.5% w/ 1:200,000 epinephrine at 12:14 CDT.         Test dose negative, 3 minutes after injection, for signs of intravascular, subdural, or intrathecal injection.       Insertion/Infusion Method: LORT saline       No aspiration negative for Heme or CSF via Epidural Catheter.       Sensory Level Left: T7.       Sensory Level Right: T7.    Medication(s) Administered   Medication Administration Time: 4/11/2024 12:00 PM      FOR [facility] ([facility]) ONLY:   Pain Team Contact information: please page the Pain Team Via [employer]. Search \"Pain\". During daytime hours, please page the attending first. At night please page the resident first.      "

## 2024-04-11 NOTE — PLAN OF CARE
Goal Outcome Evaluation:      Plan of Care Reviewed With: patient    Overall Patient Progress: improvingOverall Patient Progress: improving       Patient resting comfortably with labor epidural. Is able to feel intermittent rectal pressure. No acute discomfort in her abdomen.

## 2024-04-11 NOTE — TELEPHONE ENCOUNTER
Ucs began last night.  Did have IC last night.  Ucs about 4 min x 55 sec x 40 minutes.  Has diarrhea. Not feeling a ton pelvic pressure.  Cramping in low back.  No bloody show.  GBS positive.  Feeling well at home.  Hoping for natural birth, without intervention.  Uc's have been increasing in intensity since this AM.  Feeling normal fetal movement.  Lives 5 minutes from hospital.  Wants to try tub or shower for pain relief.  Will call CNM back in 30 min if no changes, and will consider having her present to hospital at that point.  Danger s/sx reviewed, call back with any change in status.    ATUL Zamora, WALLACE

## 2024-04-11 NOTE — PROGRESS NOTES
"Labor Progress Note:    Patient Name:  Indigo Bright  :      2000  MRN:      [id number]      Assessment:    SIUP at 39 6/7 weeks  Cat 1 FHT  Epidural anesthesia  Early active labor    Plan:   Discussed recheck of cervix in 2-3 hours. If no change, would recommend IUPC and pitocin augmentation.  Reviewed r/b/a of pitocin, and patient agrees with plan of care.  Continue position changes Q 30 minutes with epidural in place.  Encouraged PO hydration. Anticipate NSVB.    Subjective:  Indigo Bright is coping well with labor.  Received epidural anesthesia for relief at 12:00 and is now feeling comfortable. Notes \"butt cramping\" with contractions, but denies consistent pelvic pressure or pain.  Able to relax without difficulty.  Small sips of clear liquids provided.   at bedside, providing great support, asking good questions.  Received consent for VE from Indigo after she was comfortable.  Cx 5-6/80%/0. Slight bulge in BOW noted.  Discussed AROM with Indigo and Austyn.  Discussed risks/benefits/alternatives with patient including but not limited to increased risk of infection, increased risk of cord prolapse, although with the position of her fetus, noted to be very small chance.  Indigo consents to AROM and it was performed without difficulty during UC with very small amount of fluid return.  Fluid appears clear in color.      Objective:  /58   Temp 98.2  F (36.8  C) (Oral)   Resp 18   Ht 1.6 m (5' 3\")   Wt 112.9 kg (249 lb)   LMP 2023 (Exact Date)   SpO2 100%   BMI 44.11 kg/m      FHR: 150, moderate variability, accels present, no decels noted    Uterine contractions: every 2-4 minutes, lasting 50-60 seconds, moderate to palpation.    SVE:  5-6/80%/0      Provider: ATUL Stringer CNM    Date:  2024  Time:  1:32 PM    25 minutes on the date of the encounter doing chart review, review of test results, interpretation of tests, patient visit, and " documentation

## 2024-04-12 PROBLEM — Z37.9 VACUUM-ASSISTED VAGINAL DELIVERY: Status: ACTIVE | Noted: 2024-04-11

## 2024-04-12 LAB — HGB BLD-MCNC: 9.4 G/DL (ref 11.7–15.7)

## 2024-04-12 PROCEDURE — 250N000013 HC RX MED GY IP 250 OP 250 PS 637: Performed by: ADVANCED PRACTICE MIDWIFE

## 2024-04-12 PROCEDURE — 36415 COLL VENOUS BLD VENIPUNCTURE: CPT | Performed by: MIDWIFE

## 2024-04-12 PROCEDURE — 85018 HEMOGLOBIN: CPT | Performed by: MIDWIFE

## 2024-04-12 PROCEDURE — 250N000013 HC RX MED GY IP 250 OP 250 PS 637: Performed by: MIDWIFE

## 2024-04-12 PROCEDURE — 250N000009 HC RX 250: Performed by: MIDWIFE

## 2024-04-12 PROCEDURE — 99231 SBSQ HOSP IP/OBS SF/LOW 25: CPT | Performed by: ADVANCED PRACTICE MIDWIFE

## 2024-04-12 PROCEDURE — 250N000011 HC RX IP 250 OP 636: Performed by: MIDWIFE

## 2024-04-12 PROCEDURE — 120N000001 HC R&B MED SURG/OB

## 2024-04-12 RX ORDER — FERROUS SULFATE 325(65) MG
325 TABLET ORAL DAILY
Status: DISCONTINUED | OUTPATIENT
Start: 2024-04-12 | End: 2024-04-13 | Stop reason: HOSPADM

## 2024-04-12 RX ADMIN — ENOXAPARIN SODIUM 40 MG: 100 INJECTION SUBCUTANEOUS at 09:50

## 2024-04-12 RX ADMIN — ACETAMINOPHEN 650 MG: 325 TABLET ORAL at 02:43

## 2024-04-12 RX ADMIN — ACETAMINOPHEN 650 MG: 325 TABLET ORAL at 06:35

## 2024-04-12 RX ADMIN — IBUPROFEN 800 MG: 800 TABLET ORAL at 08:49

## 2024-04-12 RX ADMIN — Medication 100 ML/HR: at 00:16

## 2024-04-12 RX ADMIN — IBUPROFEN 800 MG: 800 TABLET ORAL at 21:03

## 2024-04-12 RX ADMIN — IBUPROFEN 800 MG: 800 TABLET ORAL at 02:43

## 2024-04-12 RX ADMIN — ACETAMINOPHEN 650 MG: 325 TABLET ORAL at 23:52

## 2024-04-12 RX ADMIN — IBUPROFEN 800 MG: 800 TABLET ORAL at 14:55

## 2024-04-12 RX ADMIN — ENOXAPARIN SODIUM 40 MG: 100 INJECTION SUBCUTANEOUS at 21:03

## 2024-04-12 RX ADMIN — ACETAMINOPHEN 650 MG: 325 TABLET ORAL at 13:53

## 2024-04-12 RX ADMIN — FERROUS SULFATE TAB 325 MG (65 MG ELEMENTAL FE) 325 MG: 325 (65 FE) TAB at 21:03

## 2024-04-12 RX ADMIN — DOCUSATE SODIUM 100 MG: 100 CAPSULE, LIQUID FILLED ORAL at 08:49

## 2024-04-12 ASSESSMENT — ACTIVITIES OF DAILY LIVING (ADL)
ADLS_ACUITY_SCORE: 20

## 2024-04-12 NOTE — CONSULTS
[facility] LABOR & DELIVERY   [facility] CONSULTATION    NAME:Indigo Bright  : 2000   MRN: [id number]   GESTATIONAL AGE: 39w6d    ADMISSION DATE: 2024     PCP:  No Ref-Primary, Physician     CHIEF COMPLAINT: Arrest of descent    HPI: I have been requested by Darling Nassar CNM to evaluate Indigo Bright for arrest of descent. Patient is a 24 year old,  female AT 39w6d gestation.  Patient has been pushing for approximately 2 and half hours.  The assessment is that there has not been significant descent.  There are moderate variable decelerations with good xcwx-js-ubze variability.  Contractions appear inadequate.  Patient has an epidural in place    DIAGNOSIS COMPLICATING PREGNANCY  None    OBSTETRICAL HISTORY:         PAST MEDICAL HISTORY:  Past Medical History:   Diagnosis Date    Hypertension 08/15/21        PAST SURGICAL HISTORY:  Past Surgical History:   Procedure Laterality Date    WISDOM TOOTH EXTRACTION          SOCIAL HISTORY:   reports that she has never smoked. She has never been exposed to tobacco smoke. She has never used smokeless tobacco. She reports that she does not currently use alcohol. She reports that she does not use drugs.     MEDICATIONS:  Current Facility-Administered Medications   Medication Dose Route Frequency Provider Last Rate Last Admin    carboprost (HEMABATE) injection 250 mcg  250 mcg Intramuscular Q15 Min PRN Josi Elizondo CNM        And    loperamide (IMODIUM) capsule 4 mg  4 mg Oral Once PRN Josi Elizondo CNM        ePHEDrine injection 5 mg  5 mg Intravenous Q3 Min PRN Sanchez Ceron MD        fentaNYL (PF) (SUBLIMAZE) injection 100 mcg  100 mcg Intravenous Q1H PRN Josi Elizondo CNM        fentaNYL (SUBLIMAZE) 2 mcg/mL, ROPivacaine (NAROPIN) 0.1% in NaCl 0.9% for PIB + PCEA PREMIX   EPIDURAL PIB Sanchez Ceron MD   $New Syringe/Cartridge at 24 1218    ketorolac (TORADOL) injection 30 mg  30 mg  Intravenous Once PRN Josi Elizondo CNM        Or    ketorolac (TORADOL) injection 30 mg  30 mg Intramuscular Once PRN Josi Elizondo CNM        Or    ibuprofen (ADVIL/MOTRIN) tablet 800 mg  800 mg Oral Once PRN Josi Elizondo, CNM        lactated ringers BOLUS 1,000 mL  1,000 mL Intravenous Once PRN Josi Elizondo CNM        Or    lactated ringers BOLUS 500 mL  500 mL Intravenous Once PRN Josi Elizondo CNM        lactated ringers BOLUS 250 mL  250 mL Intravenous Once PRN Sanchez Ceron MD        lactated ringers infusion   Intravenous Continuous PRN Darling Nassar CNM        lidocaine (LMX4) cream   Topical Q1H PRN Darling Nassar CNM        lidocaine 1 % 0.1-1 mL  0.1-1 mL Other Q1H PRN Darling Nassar CNM        lidocaine 1 % 0.1-20 mL  0.1-20 mL Subcutaneous Once PRN Josi Elizondo CNM        lidocaine-EPINEPHrine 1.5 %-1:351984 injection 3 mL  3 mL EPIDURAL Once PRN Sanchez Ceron MD        loperamide (IMODIUM) capsule 2 mg  2 mg Oral Q2H PRN Josi Elizondo CNM        Medication Instructions - cervical ripening and induction medications   Does not apply Continuous PRN Darling Nassar CNM        methylergonovine (METHERGINE) injection 200 mcg  200 mcg Intramuscular Q2H PRN Josi Elizondo CNM        metoclopramide (REGLAN) injection 10 mg  10 mg Intravenous Q6H PRN Josi Elizondo CNM        Or    metoclopramide (REGLAN) tablet 10 mg  10 mg Oral Q6H PRN Josi Elizondo CNM        misoprostol (CYTOTEC) tablet 400 mcg  400 mcg Oral ONCE PRN REPEAT PER INSTRUCTIONS Josi Elizondo CNM        Or    misoprostol (CYTOTEC) tablet 800 mcg  800 mcg Rectal ONCE PRN REPEAT PER INSTRUCTIONS Josi Elizondo CNM        nalbuphine (NUBAIN) injection 2.6-5 mg  2.6-5 mg Intravenous Q6H PRN Sanchez Ceron MD        naloxone (NARCAN) injection 0.2 mg  0.2 mg Intravenous Q2 Min PRN Josi Elizondo CNM        Or    naloxone (NARCAN) injection 0.4 mg  0.4 mg Intravenous Q2 Min PRN  Josi Elizondo CNM        Or    naloxone (NARCAN) injection 0.2 mg  0.2 mg Intramuscular Q2 Min PRN Josi Elizondo CNM        Or    naloxone (NARCAN) injection 0.4 mg  0.4 mg Intramuscular Q2 Min PRN Josi Elizondo CNM        nitrous oxide/oxygen 50/50 blend   Inhalation Continuous PRN Josi Elizondo CNM        ondansetron (ZOFRAN ODT) ODT tab 4 mg  4 mg Oral Q6H PRN Josi Elizondo CNM        Or    ondansetron (ZOFRAN) injection 4 mg  4 mg Intravenous Q6H PRN Josi Elizondo CNM        oxytocin (PITOCIN) 30 units in 500 mL 0.9% NaCl infusion  100-340 mL/hr Intravenous Continuous PRN Josi Elizondo CNM        oxytocin (PITOCIN) 30 units in 500 mL 0.9% NaCl infusion  340 mL/hr Intravenous Continuous PRN Josi Elizondo CNM        oxytocin (PITOCIN) 30 units in 500 mL 0.9% NaCl infusion  1-24 giuliana-units/min Intravenous Continuous Darling Nassar CNM 2 mL/hr at 04/11/24 1914 2 giuliana-units/min at 04/11/24 1914    oxytocin (PITOCIN) injection 10 Units  10 Units Intramuscular Once PRN Josi Elizondo CNM        oxytocin (PITOCIN) injection 10 Units  10 Units Intramuscular Once PRN Josi Elizondo CNM        penicillin G potassium 3 Million Units in D5W 50 mL intermittent infusion  3 Million Units Intravenous Q4H Josi Elizondo  mL/hr at 04/11/24 1847 3 Million Units at 04/11/24 1847    prochlorperazine (COMPAZINE) injection 10 mg  10 mg Intravenous Q6H PRN Josi Elizondo CNM        Or    prochlorperazine (COMPAZINE) tablet 10 mg  10 mg Oral Q6H PRN Josi Elizondo CNM        Or    prochlorperazine (COMPAZINE) suppository 25 mg  25 mg Rectal Q12H PRN Josi Elizondo CNM        sodium chloride (PF) 0.9% PF flush 3 mL  3 mL Intracatheter Q8H Darling Nassar CNM        sodium chloride (PF) 0.9% PF flush 3 mL  3 mL Intracatheter q1 min prn Darling Nassar CNM        sodium chloride 0.9 % infusion   Intrauterine Continuous Darling Nassar CNM        sodium chloride 0.9% BOLUS  "250 mL  250 mL Intrauterine Once Darling Nassar CNM        sodium citrate-citric acid (BICITRA) solution 30 mL  30 mL Oral Once PRN Josi Elizondo CNM        terbutaline (BRETHINE) injection 0.25 mg  0.25 mg Subcutaneous Once PRN Darling Nassar CNM        tranexamic acid 1 g in 100 mL NS IV bag (premix)  1 g Intravenous Q30 Min PRN Josi Elizondo CNM         Facility-Administered Medications Ordered in Other Encounters   Medication Dose Route Frequency Provider Last Rate Last Admin    lidocaine (PF) (XYLOCAINE) 1 % injection   Other PRN Sanchez Ceron MD   2 mL at 04/11/24 1216    lidocaine-EPINEPHrine 1.5 %-1:[id number] injection   EPIDURAL PRN Sanchez Ceron MD   2 mL at 04/11/24 1216        ALLERGIES:  Allergies   Allergen Reactions    Seasonal Allergies Headache        REVIEW OF SYSTEMS   Negative except what is stated in the HPI    PHYSICAL EXAM:  /58   Temp 99.1  F (37.3  C) (Oral)   Resp 20   Ht 1.6 m (5' 3\")   Wt 112.9 kg (249 lb)   LMP 06/12/2023 (Exact Date)   SpO2 100%   BMI 44.11 kg/m    GEN: NAD; Alert and oriented, appropriate affect.  HEENT  negative  Heart     S1, S2 normal, no murmur, click, rub or gallop, regular rate and rhythm, chest is clear without rales or wheezing, no pedal edema, no JVD, no hepatosplenomegaly  Lungs    Clear to auscultation, no wheezes or crackles, normal breath sounds  Abdomen   Abdomen soft, non-tender. BS normal. No masses, organomegaly  Extremities  Normal  Vaginal exam: Complete +2 station  Membranes: Ruptured, reportedly with thick MEC    Fetal heart Rate Tracing: Category 2.  Good ombi-hn-uqwr variability.  Moderate D cells.  Contractions: IUPC    LABS:  .RESUFAST[hgb    Lab Results: personally reviewed    IMAGING:  US Fetal Biophys Prof w/o Non Stress Test  Narrative: EXAM: US OB FETAL BIOPHY PROFILE W/O NST SINGLE W/LTD  LOCATION: [facility]  DATE: 4/10/2024    INDICATION:  Morbid maternal obesity (H)  COMPARISON: " None.    FINDINGS:  Single living fetus, vertex, fetal spine at the left side presentation.    HEART RATE: 156 bpm.  SDP 4.2 cm.  PLACENTA: Posterior. No previa.  CERVIX: Obscured by fetal parts.     2/2 fetal breathing  2/2 fetal movements  2/2 fetal tone  2/2 amniotic fluid    Total biophysical profile   Impression: IMPRESSION:  1.  Single living intrauterine gestation in vertex presentation.  2.  Normal  biophysical profile.     I have reviewed the radiologists interpretation     IMPRESSION:  24 year old  @ 39w6d   Pregnancy complications include:   Arrest of descent  Category 2 tracing    RECOMMENDATIONS:  I pushed with the patient over 3 contractions.  She seemed to make good progress into.  There was definite descent of the head.    I discussed with the patient the fact that she was not quite to the point where I could offer her operative vaginal delivery.  We discussed having her continue to push for the next 20 to 30 minutes.  If she continues to make progress she may be a candidate for operative vaginal delivery at that point.    We also discussed going straight to  section.  At this point she is comfortable with the first option and we will continue to keep a close eye on her progress.      Thank you for allowing us to participate in the care of this patient.  Please contact us with any questions/concerns.      Yuri Hart MD on 2024 at 7:24 PM

## 2024-04-12 NOTE — L&D DELIVERY NOTE
Delivery Summary    Indigo Bright MRN# [id number]   Age: 24 year old YOB: 2000     ASSESSMENT & PLAN: Vacuum assited vaginal delivery at term       Heather Bright, Kamla-Indigo [[id number]]      Labor Event Times      Latent labor onset date/time: 4/10/2024 2200    Active labor onset date: 24 Onset time: 11:00 AM   Dilation complete date: 24 Complete time:  4:40 PM          Labor Events     labor?: No   steroids: None  Predominate monitoring during 1st stage: continuous electronic fetal monitoring     Antibiotics received during labor?: Yes  Reason for Antibiotics: GBS  Antibiotics received for GBS: Penicillin       Rupture date/time:     Fluid color: Clear, Meconium  Fluid odor: Normal     Augmentation: Oxytocin  Indications for augmentation: Ineffective Contraction Pattern       Delivery/Placenta Date and Time      Delivery Date: 24 Delivery Time:  7:49 PM   Placenta Date/Time: 2024  7:51 PM  Delivering clinician: Yuri Hart MD   Other personnel present at delivery:  Provider Role   Darling Nassar CNM Certified Nurse Midwife   Tiana Figueroa, RN Registered Nurse   Ivette Macias RN Registered Nurse             Vaginal Counts       Initial count performed by 2 team members:  Two Team Members   Opal Hart         Needles Suture Needles Sponges (RETIRED) Instruments   Initial counts 1 1 0    Added to count       Relief counts       Final counts               Placed during labor Accounted for at the end of labor   FSE Yes Yes   IUPC Yes Yes   Cervidil No NA                  Final count performed by 2 team members:  Two Team Members   Tiana Hart      Final count correct?: Yes  Pre-Birth Team Brief: Complete  Post-Birth Team Debrief: Complete       Apgars    Living status: Living   1 Minute 5 Minute 10 Minute 15 Minute 20 Minute   Skin color: 0  1       Heart rate: 2  2       Reflex irritability: 2  2      "  Muscle tone: 2  2       Respiratory effort: 2  2       Total: 8  9       Apgars assigned by: MINAL GOFF RN       Cord      Vessels: 3 Vessels    Cord Complications: None               Cord Blood Disposition: Discard    Gases Sent?: No    Delayed cord clamping?: Yes    Cord Clamping Delay (seconds):  seconds    Stem cell collection?: No           [place] [facility]    [place] [facility] at Delivery: Asked by Dr. Hart to attend delivery due to vacuum assisted delivery and thick meconium stained amniotic fluid.  Term female infant born with spontaneous respirations and placed on mother's abdomen.  Infant vigorous and crying, pink in room-air.  NNP did not evaluate infant and was excused from delivery room at 2 minutes of age.    CHEVY Becker APRN, CNP        Measurements      Weight: 7 lb 1 oz Length: 1' 7\"     Head circumference: 33 cm           Skin to Skin and Feeding Plan      Skin to skin initiation date/time: 1841    Skin to skin with: Mother  Skin to skin end date/time:            Labor Events and Shoulder Dystocia    Fetal Tracing Prior to Delivery: Category 2  Shoulder dystocia present?: Neg       Delivery (Maternal) (Provider to Complete) ([id number])    Episiotomy: None  Perineal lacerations: None      Sulcus laceration: left    Est. blood loss (mL): 600  Repair suture: 3-0 Vicryl  Number of repair packets: 1  Genital tract inspection done: Pos       Blood Loss  Mother: Indigo Lin N #[id number]     Start of Mother's Information      Delivery Blood Loss  24 1100 - 24 2133      EBL (mL) Hospital Encounter 600 mL    Delivery QBL (mL) Hospital Encounter 600 mL    Total  1200 mL               End of Mother's Information  Mother: Indigo Lin N #[id number]                Delivery - Provider to Complete ([id number])    Delivering clinician: Yrui Hart MD  Delivery Type (Choose the 1 that will go to the Birth History): Vaginal, Vacuum (Extractor)    Verbal " Informed Consent Obtained For Vacuum: Yes Alternate Labor Strategies Discussed (vacuum): Yes      Emergency Resources Available (vacuum): Charge Nurse/Team, Resuscitation Team     Type (vacuum): mity vac         # of Pop-Offs (vacuum): 0 # of Pulls/Contractions (vacuum): 4   Position (vacuum): OA Fetal Station (vacuum): +3        Indication for Operative Vaginal Delivery (vacuum): Prolonged 2nd Stage     Operative Vaginal Delivery Brief Note Vacuum: Active coaching was done over 2.5 hours with the patient. She made slow progress to +3 station but was unable to progress further. In addition, she began having more prolonged variable decels.                For  or operative vaginal delivery, labor was most recently managed by (if not delivering provider): Yuri Hart MD     Other personnel:  Provider Role   Darling Nassar CNM Certified Nurse Midwife   Tiana Figueroa, RN Registered Nurse   Ivette Macias RN Registered Nurse                    Placenta    Date/Time: 2024  7:51 PM  Removal: Spontaneous  Disposition: Hospital disposal             Anesthesia    Method: Epidural  Cervical dilation at placement: 4-7                    Presentation and Position    Presentation: Vertex    Position: Middle Occiput Anterior                     Yuri Hart MD

## 2024-04-12 NOTE — PROGRESS NOTES
"Labor Progress Note:    Patient Name:  Indigo Bright  :      2000  MRN:      [id number]    Assessment:    , at 39w 6d   second stage of labor  Category II   FHR tracing  ROM at 1310  IUPC with meconium fluid noted      Plan:   -IUPC  -amnioinfusion  -IV Pitocin augmentation  -consult with [facility] Dr Hart to evaluate for assisted delivery at 1915  -continue pushing in various positions  -Dr Hart returned to bedside at approx 1940 and assumed care of patient for delivery. Prepared for VAVD    Subjective:  Indigo Bright is feeling tailbone and hip pain. Pushing with every contraction when this writer assumed care at 1800. Minimal descent noted since pushing began at 1640. IUPC placed around 1845. Contractions noted to be Q2 min with peak at 40-50 mm. Palpate mild to moderate with pat positioned on right side. IV Ptiocin started for augmentation with maximum dose of 2 mu/min. Wade reinserted with minimal return. Pt received 500 ml IVF bolus. Amnio infusion was started due to repetitive variable decels with some late decelerations. Thick meconium fluid noted with insertion of IUPC.    Objective:  /58   Temp 99.1  F (37.3  C) (Oral)   Resp 20   Ht 1.6 m (5' 3\")   Wt 112.9 kg (249 lb)   LMP 2023 (Exact Date)   SpO2 98%   BMI 44.11 kg/m    FHR: see nursing notes  Uterine contractions: Q2min    TT with patient 100 mn >50% time spent in counseling    Provider:  Darling Nassar DNP,APRN,CNM    Date:  2024Time:  8:23 PM    "

## 2024-04-12 NOTE — PROGRESS NOTES
Updated Darling Nassar on 129 gram blood loss while getting up to the bathroom. Included in the 129 grams is a 60 gram baseball sized clot. Updated on total QBL of 1301 and second bag of pitocin running. No new orders at this time. Will continue to monitor.

## 2024-04-12 NOTE — PROGRESS NOTES
Updated by RN that BP elevated postpartum with new onset of headache. Will have pre eclampsia labs redrawn now, medicate with Tylenol for headache prn.

## 2024-04-12 NOTE — PROGRESS NOTES
"Vaginal Delivery Postpartum Day 1    Patient Name:  Indigo Bright  :      2000  MRN:      [id number]      Subjective:  Indigo is pleased with her care. Was able to get short stretch of sleep last night and again this morning. Reports difficulty ambulating to bathroom due to stiffness and difficulty getting out of bed, but denies any dizziness, shortness of breath, or feeling faint. Pain controlled with ibuprofen/acetaminophen. Voiding without issue. Passing flatus but has not had BM yet. Bleeding has slowed and is now less than height of period; passed one clot yesterday and has not seen any since. The baby is well and being fed by breast. She has been using the nipple shield and reports minimal pain with nursing sessions. Hoping to try to nurse without shield with IBCLC support this afternoon. Reports no history of depression or anxiety, though notes that her mother struggled with PPMD. Partner Austyn supportive at bedside. Planning to stay another night for breastfeeding support and wants to feel more independent with ambulating.     Objective:  /67 (BP Location: Left arm, Patient Position: Semi-Wood's, Cuff Size: Adult Regular)   Pulse 86   Temp 98.9  F (37.2  C) (Oral)   Resp 16   Ht 1.6 m (5' 3\")   Wt 112.9 kg (249 lb)   LMP 2023 (Exact Date)   SpO2 97%   Breastfeeding Unknown   BMI 44.11 kg/m      Breasts:soft, nipples intact, flat nipples noted that kasey with stimulation  Abdomen: FF, midline U/1, no tenderness   Perineum: approximated, small edema. Small lochia rubra without clots on pad  Extremities: no erythema, tenderness, or warmth; edema +1     HGB 24@0653: 9.4, down form 13.1 on admission    Immunization History   Administered Date(s) Administered    COVID-19 MONOVALENT 12+ (Pfizer) 2021, 2021    Influenza Vaccine >6 months,quad, PF 2023    TDAP (Adacel,Boostrix) 2024       Assessment:  Stable PPD #1 s/p " VAVB  Anemia  Breastfeeding    Plan:   1- Continue routine postpartum cares: encouraged warm baths, rest, bonding time with   2- Has breast pump at home; open to home visiting RN if insurance covers.  3- Verbally discussed postpartum mood changes and adjustments, postpartum blues vs. postpartum depression, sleep changes, required support, pain management, breastfeeding and breast care, bleeding, perineal care, and fluid shifts.    4- Encouraged to utilize IBCLC/RN support for breastfeeding.  5- Will start PO iron supplementation for asymptomatic anemia. Discussed the importance of rest and adequate hydration.   6- Discharge to home tomorrow. Follow-up at 2 and 6 weeks postpartum in clinic or sooner as needed. Patient instructed to call the [facility] scheduling number for appointment at [phone].    Provider:  ATUL Nunes CNM

## 2024-04-12 NOTE — PROGRESS NOTES
Spoke to Darling Nassar and updated on blood pressures of 150's-160's with a slight headache. Declined tylenol. No blurred vision or epigastric pain. New orders for labs and straight cath for urine. Will continue to monitor.

## 2024-04-12 NOTE — PLAN OF CARE
Problem: Postpartum (Vaginal Delivery)  Goal: Hemostasis  4/12/2024 1053 by Ce Chan, RN  Outcome: Progressing  4/12/2024 0742 by Ce Chan RN  Outcome: Progressing     Problem: Postpartum (Vaginal Delivery)  Goal: Successful Parent Role Transition  Intervention: Support Parent Role Transition  Recent Flowsheet Documentation  Taken 4/12/2024 0945 by Ce Chan, RN  Supportive Measures:   positive reinforcement provided   active listening utilized   self-care encouraged  Parent-Child Attachment Promotion:   positive reinforcement provided   caring behavior modeled   interaction encouraged   strengths emphasized     Problem: Postpartum (Vaginal Delivery)  Goal: Absence of Infection Signs and Symptoms  4/12/2024 1053 by Ce Chan, RN  Outcome: Progressing  4/12/2024 0742 by Ce Chan RN  Outcome: Progressing   Goal Outcome Evaluation:

## 2024-04-12 NOTE — PROGRESS NOTES
Spoke to Darling Nassar and confirmed labs WDL. Recommended to have patient take tylenol. Updated on increased bleeding, 300 ml since delivery. Hoping that draining the bladder with straight cath will help with bleeding. Will continue to monitor.

## 2024-04-12 NOTE — PROGRESS NOTES
Patient pushing since 1640.  Repetitive variable and late decelerations since about an hour prior to pushing. CNMW aware. RN/ midwife at bedside continuously during pushing efforts. CNMW aware of blood pressures. Labs unremarkable. Pressure normotensive in between pushing efforts.

## 2024-04-12 NOTE — PLAN OF CARE
Problem: Postpartum (Vaginal Delivery)  Goal: Hemostasis  Outcome: Progressing   Goal Outcome Evaluation:    VSS. Bleeding has been minimal since 0130. Has not passed any more clots. Will have hemoglobin check this morning. Will continue to monitor.

## 2024-04-12 NOTE — PROGRESS NOTES
"Labor Progress Note:    Patient Name:  Indigo Bright  :      2000  MRN:      [id number]      Assessment:    SIUP at 39 6/7 weeks  Second stage of labor  Pushing x 1:20 minutes  Cat 2 FHT  Epidural anesthesia- rebolus per MDA  Maternal pain in left buttock/back  Elevated BP's with normal labs    Plan:  MDA called to rebolus epidural due to maternal pain.  Continue position changes and pushing as fetus and patient tolerate.  IVF as needed.  Consider repeat of straight cath to empty bladder.  Reassess In 15-30 minutes for fetal descent.  Report to Darling Nassar CNM who assumes care at 18:00.    Subjective:  Indigo Bright is pushing with great effort.  She began feeling increasing pressure with strong urge to push.  Cervix found to be complete and +1 at 16:40.  Pushing initiated due to involuntary maternal pushing.  Pushing in right, left sidelying, as well as towel pull and birth bar.  There has been little to no fetal decent over the past hour.  Discussed with Indigo and Austyn the progress of fetal station, discussed finding of caput on fetal head.  Indigo has noted increased pain in her left buttock and low back, reports it is \"seizing up\" and very painful.  Deep tissue massage of buttock gives her some relief.  Position changes have not improved the pain.  Discussed calling MDA for rebolus to attempt to manage pain.        Objective:  /58   Temp 98.9  F (37.2  C) (Oral)   Resp 20   Ht 1.6 m (5' 3\")   Wt 112.9 kg (249 lb)   LMP 2023 (Exact Date)   SpO2 100%   BMI 44.11 kg/m      FHR: 150, moderate variability with variables with pushing to 100-110.  Some difficulty monitoring FHT externally due to fetal position and maternal body habitus.  IFSE placed at 17:45 after receiving maternal consent.    Uterine contractions: every 2 minutes, lasting 55-65 seconds.    SVE:  10/100%/+1, caput noted      Provider: ATUL Stringer CNM    Date:  2024  Time:  6:06 " PM    90 minutes on the date of the encounter doing chart review, review of outside records, review of test results, patient visit, documentation, and discussion with family

## 2024-04-12 NOTE — LACTATION NOTE
"This note was copied from a baby's chart.  Rounded on family for lactation support per lactation consult request. Azucena is the first born for Indigo and Austyn.  Indigo denies maternal issues affecting milk supply.  She has been using a nipple shield to assist with the latch.  Azucena delivered via VAVD with significant bruising.    This LC assessed baby's tone and suck with gloved hands.  Muscle tension appreciated through tongue retraction, biting and shoulder raises as well as Azucena showing a straight upright sitting position.  Educated/demonstrated gentle massage to baby's back, shoulders, neck and jaws to ease muscle tension and facilitate a wider gape for latch.    Educated/reviewed hand expression using a C Hold and \"press, compress and release\".  Mom had great success with deep breast compression. Educated/reviewed importance of achieving an asymmetrical pain free latch with breastfeeding parent's nipple pointed toward baby's nose.  Assisted the dyad to achieve a deep asymmetrical latch in a football position with vertical maternal pillow support to allow for full arm and baby ROM. Latch achieved after repeated attempt however the latch was shallow and painful for Indigo.  Educated on application and use of a 20 mm nipple shield to facilitate a latch.  Assisted the dyad to achieve a pain free latch.  Breast compression encouraged to optimize milk transfer. Audible swallows were present.  Following the feeding, Indigo reported that her fed breast was softer and lighter.    Encouraged Indigo to attempt a latch without the shield to start for 5-15 min as able and then to initiate the nipple shield to achieve a successful feeding.    Educated/reviewed milk production of supply and demand.  Encouraged mom to breastfeed on demand with a goal of 8 feedings per day to help milk production. Reviewed expectation of transitional milk arriving by 3-5 days of life and mature milk by 2 weeks of life.  Educated on " importance of frequent breastfeeding or milk expression to establish a healthy milk supply.    Educated/reviewed signs of milk transfer with gentle tug at the breast, audible swallows and wet and soiled diapers per the education folder I & O.     Reviewed use of education folder for self learning, lactation and community support, indicators to call MD and maternal/family well being.    Provided education and a resource/teaching sheet with QR codes for video support/education for:  Hand expressing and storing breastmilk  Achieving a Deep Asymmetrical Latch  Breastfeeding Positions  How to Choose a breast pump flange size   Side Lying paced bottle feeding if supplementation is needed.  Application of a nipple shield  Use of the Spectra breastpump.    Questions encouraged and addressed.    Comfort Fonseca RNC, IBCLC

## 2024-04-12 NOTE — PROGRESS NOTES
Updated on last 2 blood pressures of 120's/60's. Okay to do Q4 vitals. Moderate bleeding. QBL of 1172. Orders to run second bag of pitocin. Will continue to monitor.

## 2024-04-12 NOTE — ANESTHESIA POSTPROCEDURE EVALUATION
Patient: Indigo Bright    Procedure: * No procedures listed *       Anesthesia Type:  Epidural    Note:     Postop Pain Control: Uneventful            Sign Out: Well controlled pain   PONV: No   Neuro/Psych: Uneventful            Sign Out: Acceptable/Baseline neuro status   Airway/Respiratory: Uneventful            Sign Out: Acceptable/Baseline resp. status   CV/Hemodynamics: Uneventful            Sign Out: Acceptable CV status; No obvious hypovolemia; No obvious fluid overload   Other NRE: NONE   DID A NON-ROUTINE EVENT OCCUR?            Last vitals:  Vitals:    04/11/24 2300 04/12/24 0100 04/12/24 0545   BP: 128/65 125/65 133/77   Pulse:  92 72   Resp:  16 16   Temp:  37  C (98.6  F)    SpO2:          Electronically Signed By: Sanchez Ceron MD  April 12, 2024  8:21 AM

## 2024-04-12 NOTE — PROGRESS NOTES
Helped patient up to bathroom. Passed a baseball sized clot weighing 60 grams. Total QBL of 1301. Patient denies dizziness and lightheadedness. Will continue to monitor.

## 2024-04-13 VITALS
OXYGEN SATURATION: 98 % | BODY MASS INDEX: 42.42 KG/M2 | HEIGHT: 63 IN | HEART RATE: 81 BPM | SYSTOLIC BLOOD PRESSURE: 130 MMHG | TEMPERATURE: 98.7 F | WEIGHT: 239.4 LBS | DIASTOLIC BLOOD PRESSURE: 79 MMHG | RESPIRATION RATE: 16 BRPM

## 2024-04-13 PROCEDURE — 250N000011 HC RX IP 250 OP 636: Performed by: MIDWIFE

## 2024-04-13 PROCEDURE — 250N000013 HC RX MED GY IP 250 OP 250 PS 637: Performed by: MIDWIFE

## 2024-04-13 RX ADMIN — IBUPROFEN 800 MG: 800 TABLET ORAL at 03:03

## 2024-04-13 RX ADMIN — IBUPROFEN 800 MG: 800 TABLET ORAL at 08:42

## 2024-04-13 RX ADMIN — ENOXAPARIN SODIUM 40 MG: 100 INJECTION SUBCUTANEOUS at 08:42

## 2024-04-13 RX ADMIN — DOCUSATE SODIUM 100 MG: 100 CAPSULE, LIQUID FILLED ORAL at 08:42

## 2024-04-13 ASSESSMENT — ACTIVITIES OF DAILY LIVING (ADL)
ADLS_ACUITY_SCORE: 20

## 2024-04-13 NOTE — PLAN OF CARE
Goal Outcome Evaluation:           Problem: Postpartum (Vaginal Delivery)  Goal: Successful Parent Role Transition  Outcome: Progressing     Problem: Postpartum (Vaginal Delivery)  Goal: Absence of Infection Signs and Symptoms  Outcome: Progressing     Problem: Postpartum (Vaginal Delivery)  Goal: Optimal Pain Control and Function  Outcome: Progressing       Baby breastfeeding cluster feeding all throughout the night. Pt is using nipple shield, which seems to be going well with breastfeeding. Pain controlled with ibuprofen and tylenol. Up independently, voiding without difficulty. Postpartum assessment WNL, see flowsheets for more information. Austyn at bedside, supportive. Caregiver education and support on-going.    Ebony Gibson RN

## 2024-04-13 NOTE — DISCHARGE SUMMARY
"OB DISCHARGE SUMMARY    POSTPARTUM VAGINAL DELIVERY NOTE        Date:  2024    Name:  Indigo Bright  :  2000  MRN:  [id number]      Admission Date:  2024  Delivery Date:  2024  Postpartum Day: 2: Vacuum-assisted vaginal birth  Indication for Induction:  N/A  Gestational Age at Delivery:  39w6d  Discharge Date:  2024    Principal Diagnosis:    Problem List Items Addressed This Visit          Other    * (Principal) Vacuum-assisted vaginal delivery    Relevant Orders    Postpartum Home Care Referral     Other Visit Diagnoses       Postpartum care and examination of lactating mother    -  Primary    Relevant Orders    Breast pump - Manual/Electric          Other Diagnosis:      Conditions complicating Pregnancy:  Patient Active Problem List   Diagnosis    Morbid obesity (H)    Sleep disorder    Allergic rhinitis, unspecified seasonality, unspecified trigger    Primary narcolepsy without cataplexy    Supervision of high risk pregnancy in third trimester    Acute left-sided low back pain without sciatica    GBS bacteriuria    Encounter for triage in pregnant patient    Pregnancy    Uterine contractions    Vacuum-assisted vaginal delivery      Condition at Discharge:  stable    Discharge Medications:      Review of your medicines        CONTINUE these medicines which have NOT CHANGED        Dose / Directions   loratadine 10 MG tablet  Commonly known as: CLARITIN      Refills: 0     PRENATAL PO      Refills: 0            STOP taking      ASPIRIN LOW DOSE 81 MG EC tablet  Generic drug: aspirin        IRON PO                  SUBJECTIVE:  Indigo Bright feels well and ready for discharge.  Reports feeling positively about birth experience.  Denies emotional concerns. Pain is well controlled with current medications: Tylenol and Ibuprofen.  The baby is a female, \"Azucena Smith\".   is breastfeeding on cue, with the assistance of the nursing staff as needed.  Tolerating " "regular diet.  Urinary output adequate and voiding without difficulty.  Has had BM.  Small amount of rubra lochia without clots.  Ambulating well.  Postpartum contraception plans include undecided: considering condoms/mini pill.  Postpartum support at home: MIL available to help as needed.  Patient will have 11 weeks off from work, and plans to return part time.  Patient denies history of depression/anxiety.       OBJECTIVE:  /79 (BP Location: Right arm, Patient Position: Semi-Wood's, Cuff Size: Adult Regular)   Pulse 81   Temp 98.7  F (37.1  C) (Oral)   Resp 16   Ht 1.6 m (5' 3\")   Wt 108.6 kg (239 lb 6.4 oz)   LMP 06/12/2023 (Exact Date)   SpO2 98%   Breastfeeding Unknown   BMI 42.41 kg/m    Patient Vitals for the past 24 hrs:   BP Temp Temp src Pulse Resp SpO2 Weight   04/13/24 0824 130/79 98.7  F (37.1  C) Oral 81 16 -- --   04/13/24 0652 -- -- -- -- -- -- 108.6 kg (239 lb 6.4 oz)   04/13/24 0310 137/56 97.6  F (36.4  C) Oral 84 18 -- --   04/12/24 2025 129/61 98.1  F (36.7  C) Oral 80 16 -- --   04/12/24 1800 128/74 98.1  F (36.7  C) Oral 81 16 98 % --   04/12/24 1400 137/69 -- -- -- -- -- --   04/12/24 1345 (!) 148/69 98.2  F (36.8  C) Oral 80 18 98 % --       General Appearance: Alert, Oriented, NAD  Respiratory: respirations unablored  Cardiovascular: well perfused  Abdomen: Soft, non-tender, Fundus firm @ 1 fb above U, midline, no unusual tenderness  Perineum: Tissue well-approximated, no edema.    Lochia: Minimal, rubra, non-malodorous, no clots.  Legs: +1 BLE edema, no calf tenderness    Recent Results (from the past 168 hour(s))   Hemoglobin   Result Value Ref Range Status    Hemoglobin 13.1 11.7 - 15.7 g/dL Final   Treponema Abs w Reflex to RPR and Titer   Result Value Ref Range Status    Treponema Antibody Total Nonreactive Nonreactive Final   Adult Type and Screen   Result Value Ref Range Status    ABO/RH(D) A POS  Final    Antibody Screen Negative Negative Final    SPECIMEN " EXPIRATION DATE [id number]  Final   Comprehensive metabolic panel   Result Value Ref Range Status    Sodium 135 135 - 145 mmol/L Final    Potassium 4.4 3.4 - 5.3 mmol/L Final    Carbon Dioxide (CO2) 20 (L) 22 - 29 mmol/L Final    Anion Gap 13 7 - 15 mmol/L Final    Urea Nitrogen 8.0 6.0 - 20.0 mg/dL Final    Creatinine 0.63 0.51 - 0.95 mg/dL Final    GFR Estimate >90 >60 mL/min/1.73m2 Final    Calcium 9.7 8.6 - 10.0 mg/dL Final    Chloride 102 98 - 107 mmol/L Final    Glucose 89 70 - 99 mg/dL Final    Alkaline Phosphatase 715 (H) 40 - 150 U/L Final    AST 14 0 - 45 U/L Final    ALT 8 0 - 50 U/L Final    Protein Total 6.8 6.4 - 8.3 g/dL Final    Albumin 3.5 3.5 - 5.2 g/dL Final    Bilirubin Total 0.2 <=1.2 mg/dL Final     *Note: Due to a large number of results and/or encounters for the requested time period, some results have not been displayed. A complete set of results can be found in Results Review.        Rubella: Immune   Blood Type: A POS    Recent labs:  Hemoglobin 9.4 ( 0653)     Immunization History   Administered Date(s) Administered    COVID-19 MONOVALENT 12+ (Pfizer) 2021, 2021    Influenza Vaccine >6 months,quad, PF 2023    TDAP (Adacel,Boostrix) 2024       ASSESSMENT:   Postpartum Day #2 , VAVB.  Breastfeeding  Stable Postpartum Course.      PLAN:    1- Continue Routine Postpartum Cares: encouraged warm baths, rest, bonding time with  and family.  2- Discharge to Home Today. Follow-up PRN at 2 weeks PP and for routine PP visit at 6 weeks postpartum in clinic or sooner as needed. Patient instructed to call the [facility] scheduling number for appointment at [phone].  3- Discharge Teaching: Verbally discussed postpartum mood changes and adjustments, postpartum blues vs. postpartum depression, sleep changes, required support, pain management, breastfeeding and breast care, engorgement, cramping, bleeding, perineal care, and fluid shifts.  Discussed ideally nothing per  vagina for next 6 weeks (tampons, intercourse) to reduce risk of infection and assist healing: birth control if resuming intercourse prior to that time and not wanting pregnancy.  Encouraged to call on-call CNM with any signs/symptoms of concern, including but not limited to: inability to cope (signs of postpartum anxiety/depression), infections (fever, chills, abdominal tenderness, breast pain or redness), pelvic pain, excessive perineal pain, bleeding (heavy bleeding/large clots), DVTs (one leg with redness/warmth/swelling, feeling like a leg cramp that will not go away), and s/s of Pre-eclampsia (severe headache unrelieved with medication/RUQ or right lower rib pain/vision changes/feeling generally unwell or something wrong).  Written discharge instructions also included with discharge orders.  4- Off work through 6 week postpartum visit, encouraged rest and anticipatory guidance given on this life transition.   5- Outpatient lactation resources reviewed including [facility] and CNMs who are also IBCLCs.  6- Postpartum home visit by RN offered.  Accepted and ordered if insurance allows.   7- Has breast pump.      Total time spent on the date of this encounter doing: chart review, review of test results, patient visit, the physical exam & procedure, education, counseling, developing this plan of care, and documenting = 30 minutes.    Provider:  ATUL Cheung CNM    Date of Service:  4/13/2024  Time:  11:45 AM

## 2024-04-13 NOTE — PLAN OF CARE
Problem: Adult Inpatient Plan of Care  Goal: Plan of Care Review  Outcome: Met  Goal: Patient-Specific Goal (Individualized)  Outcome: Met  Goal: Absence of Hospital-Acquired Illness or Injury  Outcome: Met  Goal: Optimal Comfort and Wellbeing  Outcome: Met  Goal: Readiness for Transition of Care  Outcome: Met     Problem: Postpartum (Vaginal Delivery)  Goal: Successful Parent Role Transition  Outcome: Met  Goal: Hemostasis  Outcome: Met  Goal: Absence of Infection Signs and Symptoms  Outcome: Met  Goal: Anesthesia/Sedation Recovery  Outcome: Met  Goal: Optimal Pain Control and Function  Outcome: Met     Problem: Skin Injury Risk Increased  Goal: Skin Health and Integrity  Outcome: Met  Intervention: Plan: Nurse Driven Intervention: Moisture Management  Recent Flowsheet Documentation  Taken 4/13/2024 0755 by Smita Diggs, RN  Bathing/Skin Care: other (see comments)  Taken 4/13/2024 0754 by Smita Diggs, RN  Moisture Interventions: Encourage regular toileting   Goal Outcome Evaluation:

## 2024-04-17 ENCOUNTER — LACTATION ENCOUNTER (OUTPATIENT)
Dept: PEDIATRICS | Facility: CLINIC | Age: 24
End: 2024-04-17

## 2024-04-17 ENCOUNTER — TELEPHONE (OUTPATIENT)
Dept: MIDWIFE SERVICES | Facility: CLINIC | Age: 24
End: 2024-04-17
Payer: COMMERCIAL

## 2024-04-17 DIAGNOSIS — Z37.9 VACUUM-ASSISTED VAGINAL DELIVERY: ICD-10-CM

## 2024-04-17 DIAGNOSIS — O09.93 SUPERVISION OF HIGH RISK PREGNANCY IN THIRD TRIMESTER: ICD-10-CM

## 2024-04-17 DIAGNOSIS — R03.0 ELEVATED BLOOD PRESSURE READING WITHOUT DIAGNOSIS OF HYPERTENSION: Primary | ICD-10-CM

## 2024-04-17 NOTE — LACTATION NOTE
This note was copied from a baby's chart.  Lactation Initial visit    Reason for visit/ call/ message:  Discuss feeding plan    Supply:  4-5 ounces with pumping, pumped 3 times in last 16 hours    Significant changes (medications, equipment, comfort, etc):  Milk came in yesterday    Skin to skin/ nuzzling/ latching:  On orders to recover from lumbar puncture first before lifting to feed at breast (can have feeding at breast     Education given:  engorgement    Plan:  Pump every 3 hours in maintain mode for 15 minutes        Tavia Preston RN, IBCLC   Lactation Consultant  [place]: [facility] [phone]  Office: [phone]

## 2024-04-17 NOTE — TELEPHONE ENCOUNTER
TC: spoke with Indigo who is 6 days PP of VAVD with PPH this morning who reports a episode of dizziness and an elevated blood pressure reading of 147/92.   Shares that she is currently at [facility] with her 6 day old daughter Azucena who is being evaluated for infection. They have been at the hospital for the past 2 days and she shares she has had very little sleep and no more than 2 hours of continuous sleep during that time.   She reports only dizziness, BP was checked by RN per her request at that time. Reports no HA, vision change, RUQ/epigastric pain or swelling.    Review of chart reveals pregnancy blood pressures ranged 116-130s/70s, inpatient blood pressures all wnl except for 16 hours PP reading of 148/69 and Preeclampsia labs were negative at that time. She experienced a PPH of 1300, simon hgb 9.4 asymptomatic at time of discharge.    Reviewed pt with DAMIEN Kenny who recommends close clinic follow up within 1 week for repeat blood pressure monitoring, labs if indicated.     Discussed recommendation with pt and advised self care, reviewed warning s/sx and when to call prior to planned clinic appointment for follow up. Offered to transfer to scheduling now and declined but will call back to schedule at her convenience.

## 2024-04-18 ENCOUNTER — TELEPHONE (OUTPATIENT)
Dept: MIDWIFE SERVICES | Facility: CLINIC | Age: 24
End: 2024-04-18
Payer: COMMERCIAL

## 2024-04-18 ENCOUNTER — LACTATION ENCOUNTER (OUTPATIENT)
Dept: PEDIATRICS | Facility: CLINIC | Age: 24
End: 2024-04-18

## 2024-04-18 ENCOUNTER — HOSPITAL ENCOUNTER (INPATIENT)
Facility: CLINIC | Age: 24
LOS: 2 days | Discharge: HOME HEALTH | End: 2024-04-20
Attending: OBSTETRICS & GYNECOLOGY | Admitting: OBSTETRICS & GYNECOLOGY
Payer: COMMERCIAL

## 2024-04-18 DIAGNOSIS — R00.0 TACHYCARDIA: ICD-10-CM

## 2024-04-18 DIAGNOSIS — R51.9 NONINTRACTABLE HEADACHE, UNSPECIFIED CHRONICITY PATTERN, UNSPECIFIED HEADACHE TYPE: ICD-10-CM

## 2024-04-18 DIAGNOSIS — R42 DIZZINESS: ICD-10-CM

## 2024-04-18 DIAGNOSIS — O14.10 SEVERE PRE-ECLAMPSIA, ANTEPARTUM: Primary | ICD-10-CM

## 2024-04-18 DIAGNOSIS — H53.9 VISION CHANGES: ICD-10-CM

## 2024-04-18 DIAGNOSIS — R51.9 HEADACHE: ICD-10-CM

## 2024-04-18 LAB
ALBUMIN SERPL BCG-MCNC: 3.7 G/DL (ref 3.5–5.2)
ALP SERPL-CCNC: 252 U/L (ref 40–150)
ALT SERPL W P-5'-P-CCNC: 14 U/L (ref 0–50)
ANION GAP SERPL CALCULATED.3IONS-SCNC: 12 MMOL/L (ref 7–15)
AST SERPL W P-5'-P-CCNC: 17 U/L (ref 0–45)
ATRIAL RATE - MUSE: 113 BPM
BASOPHILS # BLD AUTO: 0 10E3/UL (ref 0–0.2)
BASOPHILS NFR BLD AUTO: 0 %
BILIRUB SERPL-MCNC: 0.3 MG/DL
BUN SERPL-MCNC: 8.6 MG/DL (ref 6–20)
CALCIUM SERPL-MCNC: 8.7 MG/DL (ref 8.6–10)
CHLORIDE SERPL-SCNC: 105 MMOL/L (ref 98–107)
CREAT SERPL-MCNC: 0.65 MG/DL (ref 0.51–0.95)
DEPRECATED HCO3 PLAS-SCNC: 24 MMOL/L (ref 22–29)
DIASTOLIC BLOOD PRESSURE - MUSE: NORMAL MMHG
EGFRCR SERPLBLD CKD-EPI 2021: >90 ML/MIN/1.73M2
EOSINOPHIL # BLD AUTO: 0.1 10E3/UL (ref 0–0.7)
EOSINOPHIL NFR BLD AUTO: 2 %
ERYTHROCYTE [DISTWIDTH] IN BLOOD BY AUTOMATED COUNT: 13.9 % (ref 10–15)
GLUCOSE SERPL-MCNC: 94 MG/DL (ref 70–99)
HCT VFR BLD AUTO: 32.3 % (ref 35–47)
HGB BLD-MCNC: 10.8 G/DL (ref 11.7–15.7)
HOLD SPECIMEN: NORMAL
IMM GRANULOCYTES # BLD: 0.1 10E3/UL
IMM GRANULOCYTES NFR BLD: 1 %
INTERPRETATION ECG - MUSE: NORMAL
LACTATE SERPL-SCNC: 1 MMOL/L (ref 0.7–2)
LYMPHOCYTES # BLD AUTO: 1 10E3/UL (ref 0.8–5.3)
LYMPHOCYTES NFR BLD AUTO: 14 %
MAGNESIUM SERPL-MCNC: 1.9 MG/DL (ref 1.7–2.3)
MCH RBC QN AUTO: 29.4 PG (ref 26.5–33)
MCHC RBC AUTO-ENTMCNC: 33.4 G/DL (ref 31.5–36.5)
MCV RBC AUTO: 88 FL (ref 78–100)
MONOCYTES # BLD AUTO: 0.6 10E3/UL (ref 0–1.3)
MONOCYTES NFR BLD AUTO: 8 %
NEUTROPHILS # BLD AUTO: 5.5 10E3/UL (ref 1.6–8.3)
NEUTROPHILS NFR BLD AUTO: 75 %
NRBC # BLD AUTO: 0 10E3/UL
NRBC BLD AUTO-RTO: 0 /100
P AXIS - MUSE: 57 DEGREES
PLATELET # BLD AUTO: 295 10E3/UL (ref 150–450)
POTASSIUM SERPL-SCNC: 3.8 MMOL/L (ref 3.4–5.3)
PR INTERVAL - MUSE: 148 MS
PROT SERPL-MCNC: 6.7 G/DL (ref 6.4–8.3)
QRS DURATION - MUSE: 82 MS
QT - MUSE: 316 MS
QTC - MUSE: 433 MS
R AXIS - MUSE: 51 DEGREES
RBC # BLD AUTO: 3.67 10E6/UL (ref 3.8–5.2)
SODIUM SERPL-SCNC: 141 MMOL/L (ref 135–145)
SYSTOLIC BLOOD PRESSURE - MUSE: NORMAL MMHG
T AXIS - MUSE: 42 DEGREES
TROPONIN T SERPL HS-MCNC: 7 NG/L
VENTRICULAR RATE- MUSE: 113 BPM
WBC # BLD AUTO: 7.3 10E3/UL (ref 4–11)

## 2024-04-18 PROCEDURE — 99285 EMERGENCY DEPT VISIT HI MDM: CPT | Mod: 25 | Performed by: FAMILY MEDICINE

## 2024-04-18 PROCEDURE — 120N000002 HC R&B MED SURG/OB UMMC

## 2024-04-18 PROCEDURE — 250N000013 HC RX MED GY IP 250 OP 250 PS 637

## 2024-04-18 PROCEDURE — 93010 ELECTROCARDIOGRAM REPORT: CPT | Performed by: FAMILY MEDICINE

## 2024-04-18 PROCEDURE — 80053 COMPREHEN METABOLIC PANEL: CPT | Performed by: OBSTETRICS & GYNECOLOGY

## 2024-04-18 PROCEDURE — 83735 ASSAY OF MAGNESIUM: CPT | Performed by: OBSTETRICS & GYNECOLOGY

## 2024-04-18 PROCEDURE — 83605 ASSAY OF LACTIC ACID: CPT | Performed by: NURSE PRACTITIONER

## 2024-04-18 PROCEDURE — 93005 ELECTROCARDIOGRAM TRACING: CPT | Performed by: FAMILY MEDICINE

## 2024-04-18 PROCEDURE — 85004 AUTOMATED DIFF WBC COUNT: CPT | Performed by: NURSE PRACTITIONER

## 2024-04-18 PROCEDURE — 36415 COLL VENOUS BLD VENIPUNCTURE: CPT | Performed by: NURSE PRACTITIONER

## 2024-04-18 PROCEDURE — 85025 COMPLETE CBC W/AUTO DIFF WBC: CPT | Performed by: OBSTETRICS & GYNECOLOGY

## 2024-04-18 PROCEDURE — 250N000011 HC RX IP 250 OP 636: Mod: JZ | Performed by: NURSE PRACTITIONER

## 2024-04-18 PROCEDURE — 258N000003 HC RX IP 258 OP 636: Performed by: NURSE PRACTITIONER

## 2024-04-18 PROCEDURE — 84484 ASSAY OF TROPONIN QUANT: CPT | Performed by: OBSTETRICS & GYNECOLOGY

## 2024-04-18 RX ORDER — MAGNESIUM SULFATE IN WATER 40 MG/ML
2 INJECTION, SOLUTION INTRAVENOUS CONTINUOUS
Status: DISPENSED | OUTPATIENT
Start: 2024-04-18 | End: 2024-04-19

## 2024-04-18 RX ORDER — LABETALOL HYDROCHLORIDE 5 MG/ML
20-80 INJECTION, SOLUTION INTRAVENOUS EVERY 10 MIN PRN
Status: DISCONTINUED | OUTPATIENT
Start: 2024-04-18 | End: 2024-04-20 | Stop reason: HOSPADM

## 2024-04-18 RX ORDER — HYDRALAZINE HYDROCHLORIDE 20 MG/ML
10 INJECTION INTRAMUSCULAR; INTRAVENOUS
Status: DISCONTINUED | OUTPATIENT
Start: 2024-04-18 | End: 2024-04-20 | Stop reason: HOSPADM

## 2024-04-18 RX ORDER — NIFEDIPINE 30 MG/1
30 TABLET, EXTENDED RELEASE ORAL DAILY
Status: DISCONTINUED | OUTPATIENT
Start: 2024-04-18 | End: 2024-04-19

## 2024-04-18 RX ORDER — ACETAMINOPHEN 325 MG/10.15ML
650 LIQUID ORAL ONCE
Status: DISCONTINUED | OUTPATIENT
Start: 2024-04-18 | End: 2024-04-20 | Stop reason: HOSPADM

## 2024-04-18 RX ORDER — SODIUM CHLORIDE, SODIUM LACTATE, POTASSIUM CHLORIDE, CALCIUM CHLORIDE 600; 310; 30; 20 MG/100ML; MG/100ML; MG/100ML; MG/100ML
INJECTION, SOLUTION INTRAVENOUS CONTINUOUS
Status: DISCONTINUED | OUTPATIENT
Start: 2024-04-18 | End: 2024-04-20 | Stop reason: HOSPADM

## 2024-04-18 RX ORDER — MAGNESIUM SULFATE HEPTAHYDRATE 40 MG/ML
4 INJECTION, SOLUTION INTRAVENOUS ONCE
Status: COMPLETED | OUTPATIENT
Start: 2024-04-18 | End: 2024-04-18

## 2024-04-18 RX ORDER — METOCLOPRAMIDE HYDROCHLORIDE 5 MG/ML
10 INJECTION INTRAMUSCULAR; INTRAVENOUS ONCE
Status: COMPLETED | OUTPATIENT
Start: 2024-04-18 | End: 2024-04-18

## 2024-04-18 RX ORDER — CALCIUM GLUCONATE 94 MG/ML
1 INJECTION, SOLUTION INTRAVENOUS
Status: DISCONTINUED | OUTPATIENT
Start: 2024-04-18 | End: 2024-04-20 | Stop reason: HOSPADM

## 2024-04-18 RX ORDER — DIPHENHYDRAMINE HYDROCHLORIDE 50 MG/ML
25 INJECTION INTRAMUSCULAR; INTRAVENOUS ONCE
Status: COMPLETED | OUTPATIENT
Start: 2024-04-18 | End: 2024-04-18

## 2024-04-18 RX ADMIN — LABETALOL HYDROCHLORIDE 20 MG: 5 INJECTION, SOLUTION INTRAVENOUS at 16:51

## 2024-04-18 RX ADMIN — LABETALOL HYDROCHLORIDE 20 MG: 5 INJECTION, SOLUTION INTRAVENOUS at 20:16

## 2024-04-18 RX ADMIN — MAGNESIUM SULFATE HEPTAHYDRATE 4 G: 40 INJECTION, SOLUTION INTRAVENOUS at 17:04

## 2024-04-18 RX ADMIN — MAGNESIUM SULFATE HEPTAHYDRATE 2 G/HR: 40 INJECTION, SOLUTION INTRAVENOUS at 17:53

## 2024-04-18 RX ADMIN — NIFEDIPINE 30 MG: 30 TABLET, FILM COATED, EXTENDED RELEASE ORAL at 18:10

## 2024-04-18 RX ADMIN — DIPHENHYDRAMINE HYDROCHLORIDE 25 MG: 50 INJECTION, SOLUTION INTRAMUSCULAR; INTRAVENOUS at 17:44

## 2024-04-18 RX ADMIN — METOCLOPRAMIDE HYDROCHLORIDE 10 MG: 5 INJECTION INTRAMUSCULAR; INTRAVENOUS at 17:44

## 2024-04-18 RX ADMIN — SODIUM CHLORIDE, POTASSIUM CHLORIDE, SODIUM LACTATE AND CALCIUM CHLORIDE: 600; 310; 30; 20 INJECTION, SOLUTION INTRAVENOUS at 17:53

## 2024-04-18 ASSESSMENT — COLUMBIA-SUICIDE SEVERITY RATING SCALE - C-SSRS
6. HAVE YOU EVER DONE ANYTHING, STARTED TO DO ANYTHING, OR PREPARED TO DO ANYTHING TO END YOUR LIFE?: NO
1. IN THE PAST MONTH, HAVE YOU WISHED YOU WERE DEAD OR WISHED YOU COULD GO TO SLEEP AND NOT WAKE UP?: NO
2. HAVE YOU ACTUALLY HAD ANY THOUGHTS OF KILLING YOURSELF IN THE PAST MONTH?: NO

## 2024-04-18 ASSESSMENT — ACTIVITIES OF DAILY LIVING (ADL)
ADLS_ACUITY_SCORE: 35
ADLS_ACUITY_SCORE: 35
ADLS_ACUITY_SCORE: 36
ADLS_ACUITY_SCORE: 36
ADLS_ACUITY_SCORE: 35

## 2024-04-18 NOTE — LACTATION NOTE
This note was copied from a baby's chart.  Brief Lactation Consult    Received message from bedside RN requesting assistance with next feeding at 1500/1530. LC let RN know this is at change of shift but LC team will try to be there. If unable to join for this feeding could possibly assist with feeding at 1800. RN requests nipple shield to assist with latch. LC tubes 20 mm and 24 mm to unit. Encouraged to start with 20 mm based on infant age and size.    LC team will continue to assist.      Ce Ross RN, IBCLC   Lactation Consultant  [place]: [facility] [phone]  Office: [phone]

## 2024-04-18 NOTE — LACTATION NOTE
This note was copied from a baby's chart.  Consult For: Admit to Unit 6 Peds Med Surg    Medical History:  Indigo reports excessive sleepiness and inability to feed effectively. Azucena taken to Pediatric ED, bacteremia noted on blood cultures, Azucena admitted for abx.     Maternal Health History:  Maternal past medical history, problem list and prior to admission medications reviewed and notable for.   Information for the patient's mother:  Indigo Lin N [[id number]]     Past Medical History:   Diagnosis Date    Hypertension 08/15/21    ,   Information for the patient's mother:  Indigo Lin N [[id number]]     Patient Active Problem List   Diagnosis    Morbid obesity (H)    Sleep disorder    Allergic rhinitis, unspecified seasonality, unspecified trigger    Primary narcolepsy without cataplexy    Supervision of high risk pregnancy in third trimester    Acute left-sided low back pain without sciatica    GBS bacteriuria    Encounter for triage in pregnant patient    Pregnancy    Uterine contractions    Vacuum-assisted vaginal delivery    Elevated blood pressure reading without diagnosis of hypertension    , and   Information for the patient's mother:  Indigo Lin N [[id number]]     No medications prior to admission.    Indigo also reports hypertension and is monitoring via home BP monitor and reporting to providers.    Maternal Breast Exam/Breastfeeding History:  Indigo noted breast growth and sensitivity in early pregnancy. She denies any history of breast/chest injury or surgery. Her breasts are soft and symmetrical with bilateral intact nipples. She has been able to hand express colostrum and reports pumping 4-5oz of milk at one session, reports pumping 3 times in the last day.    Feeding History:  Indigo reports that Azucena showed excessive sleepiness and difficulty waking for feeds. When Azucena was able to latch, she would frequently fall back asleep. Indigo has begun pumping and  "Austyn (father) is bottle feeding Azucena. Azucena ok to attempt feeding at the breast per provider team.    Feeding Assessment:  Azucena just finished a bottle feed at the time of LC visit.     Education:  Hands on pumping, breast lymph drainage massage, treating and preventing engorgement, frequency of pumping, tracking output. Skin to skin.    Plan:      Pumping frequency of q3 hours with one 4 hour gap between sessions x1 overnight while Azucena is unable to latch or transfer milk effectively. Plan to use symphony breast pump while Azucena is hospitalized, Indigo has a spectra pump for use at home. Option of symphony pump rental as needed discussed. Indigo plans to begin tracking milk output over the next few days. Indigo expressed need to balance care for Azucena with own care due to blood pressure concerns, plan to \"Divide and conquer\" with cares and feedings as needed normalized while Azucena is hospitalized. Indigo to contact lactation with assistance for latching and positioning when Azucena is showing signs of readiness and with any other concerns.    Sana Salcedo RN, IBCLC   Lactation Consultant  [place]: [facility] [phone]  Office: [phone]            "

## 2024-04-18 NOTE — TELEPHONE ENCOUNTER
"PHONE NOTE: Rima calls from [facility].  Patient is 7 days postpartum after VAVD on 2024.  She has a concern for another elevated blood pressure reading.    Patient is at the [facility] because her 7-day-old  is having some issues (bacteria in her blood, but probably being discharged today).    Patient states that today the nurses were willing to take her blood pressure reading and it was 153/87.  She is feeling \"a little bit achy and like she is starting to get sick\" today but no fever.  She has small achy headache (no aura) but it is on the top of her head and more general (not sharp).  She has no nausea or vomiting, no increase in swelling in her legs face or hands, but she says that she did have quite a bit of swelling at the end of her pregnancy anyway.  Nothing new and nothing worsening.  No epigastric pain.  No visual changes.  No dizziness today.  Patient states that she called yesterday for 1724 after she had an episode where she was sitting having an intense discussion with the doctor regarding her daughter, she was stressed, and then she started to see stars and felt a bit dizzy.  Her blood pressure at the time on 2024 147/92. (See phone note below)    Since this is the second phone call regarding the same issue, and she is still having ongoing blood pressure readings, I recommended that she be seen in the nearest ER for evaluation and treatment.  Patient believes that this is due to stress and lack of sleep, but it would be better to be on the safe side.  Recommend immediate evaluation of serial blood pressures, HELLP labs as appropriate, and assessment of swelling and reflexes etc. in the ER.  Patient is understanding.  She is sitting with her  at the hospital.  Patient agrees with plan and will go to the ER now where she is for evaluation.        Here are Belinda Nielson's notes from the phone call yesterday on 24:    TC: spoke with " Indigo who is 6 days PP of VAVD with PPH this morning who reports a episode of dizziness and an elevated blood pressure reading of 147/92.   Shares that she is currently at [facility] with her 6 day old daughter Azucena who is being evaluated for infection. They have been at the hospital for the past 2 days and she shares she has had very little sleep and no more than 2 hours of continuous sleep during that time.   She reports only dizziness, BP was checked by RN per her request at that time. Reports no HA, vision change, RUQ/epigastric pain or swelling.     Review of chart reveals pregnancy blood pressures ranged 116-130s/70s, inpatient blood pressures all wnl except for 16 hours PP reading of 148/69 and Preeclampsia labs were negative at that time. She experienced a PPH of 1300, simon hgb 9.4 asymptomatic at time of discharge.     Reviewed pt with DAMIEN Kenny who recommends close clinic follow up within 1 week for repeat blood pressure monitoring, labs if indicated.

## 2024-04-18 NOTE — ED TRIAGE NOTES
Patient reports that she gave birth last Thursday. Patient's daughter is currently admitted. Patient reports she has been having high blood pressure symptoms with dizziness and seeing stars. Patient reports she has been feeling sick and achy. Patient had a vaginal birth, vacuum assisted.   Patient was advised to come by the midwife following high blood pressure readings.

## 2024-04-18 NOTE — H&P
[facility] History and Physical    Indigo Bright MRN# [id number]   Age: 24 year old YOB: 2000     Date of Admission:  2024    Primary care provider: No Ref-Primary, Physician    CED  Indigo Bright is a 24 year old  who is PPD#8 from [facility] now presenting with severe range pressures, headaches, and vision changes. Patient notably has infant admitted to the NICU at this time.     Reports since last night she has felt generalized malaise. She reports she initially checked her blood pressures and had systolic of 140s and this persisted over several checks. Her symptoms progressed and she noted a mild headache as well as 'dazed' vision. She had a bedside recheck her pressures this morning and was noted to have systolic of 170s. She presents to the ED for further evaluation. On admission she was found to have sustained severe range blood pressures.     She rates her headache as a 2/10 at this point. Primarily located in the right temple. Has a history of headaches as feels like this is not as bad. She has not tried any medical interventions on her own or in the ED yet. She or her partner have not noticed  any neurological deficits or mental fog. She otherwise denies chest pain, shortness of breath, RUQ/epigastric pain, LE swelling, or subjectively low UOP. Per patient report, had isolated elevated blood pressure postpartum, however never met criteria for hypertensive disorder of pregnancy.     ED Course   - Sustained severe range pressures > IV labetalol 20  - HELLP labs  - Trop, EKG   - Magnesium prophylaxis initiated     PMH:   Past Medical History:   Diagnosis Date    Hypertension 08/15/21       PSHx:   Past Surgical History:   Procedure Laterality Date    WISDOM TOOTH EXTRACTION         Medications:   Current Facility-Administered Medications   Medication Dose Route Frequency Provider Last Rate Last Admin    acetaminophen (TYLENOL) oral liquid 650 mg  650 mg Oral  Once Nathalia Champagne APRN CNP        calcium gluconate 10 % injection 1 g  1 g Intravenous Once PRN Nathalia Champagne APRN CNP        diphenhydrAMINE (BENADRYL) injection 25 mg  25 mg Intravenous Once Nathalia Champagne APRN CNP        hydrALAZINE (APRESOLINE) injection 10 mg  10 mg Intravenous Q20 Min PRN Nathalia Champagne APRN CNP        labetalol (NORMODYNE/TRANDATE) injection 20-80 mg  20-80 mg Intravenous Q10 Min PRN Nathalia Champagne APRN CNP   20 mg at 04/18/24 1651    lactated ringers infusion   Intravenous Continuous Nathalia Champagne APRN  mL/hr at 04/18/24 1651 New Bag at 04/18/24 1651    magnesium sulfate 4 g in 100 mL sterile water intermittent infusion  4 g Intravenous Once Nathalia Champagne APRN  mL/hr at 04/18/24 1704 4 g at 04/18/24 1704    Followed by    magnesium sulfate infusion  2 g/hr Intravenous Continuous Nathalia Champagne APRN CNP        metoclopramide (REGLAN) injection 10 mg  10 mg Intravenous Once Nathalia Champagne APRN CNP         Current Outpatient Medications   Medication Sig Dispense Refill    loratadine (CLARITIN) 10 MG tablet       Prenatal Vit-Fe Fumarate-FA (PRENATAL PO)        Current Facility-Administered Medications   Medication Dose Route Frequency Provider Last Rate Last Admin    acetaminophen (TYLENOL) oral liquid 650 mg  650 mg Oral Once Nathalia Champagne APRN CNP        calcium gluconate 10 % injection 1 g  1 g Intravenous Once PRN Nathalia Champagne APRN CNP        diphenhydrAMINE (BENADRYL) injection 25 mg  25 mg Intravenous Once Nathalia Champagne APRN CNP        hydrALAZINE (APRESOLINE) injection 10 mg  10 mg Intravenous Q20 Min PRN Nathalia Champagne APRN CNP        labetalol (NORMODYNE/TRANDATE) injection 20-80 mg  20-80 mg Intravenous Q10 Min PRN Nathalia Champagne APRN CNP   20 mg at 04/18/24 1651    lactated ringers infusion   Intravenous Continuous Nathalia Champagne APRN  mL/hr at 04/18/24 1651 New Bag at 04/18/24 1651    magnesium sulfate 4 g  in 100 mL sterile water intermittent infusion  4 g Intravenous Once Nathalia Champagne APRN  mL/hr at 04/18/24 1704 4 g at 04/18/24 1704    Followed by    magnesium sulfate infusion  2 g/hr Intravenous Continuous Nathalia Champagne APRN CNP        metoclopramide (REGLAN) injection 10 mg  10 mg Intravenous Once Nathalia Champagne APRN CNP         Current Outpatient Medications   Medication Sig Dispense Refill    loratadine (CLARITIN) 10 MG tablet       Prenatal Vit-Fe Fumarate-FA (PRENATAL PO)          Allergies:      Allergies   Allergen Reactions    Seasonal Allergies Headache       Social History:   Social History     Socioeconomic History    Marital status:      Spouse name: Austyn    Number of children: 0    Years of education: Not on file    Highest education level: Bachelor's degree (e.g., BA, AB, BS)   Occupational History    Occupation: [employer]   Tobacco Use    Smoking status: Never     Passive exposure: Never    Smokeless tobacco: Never   Substance and Sexual Activity    Alcohol use: Not Currently     Comment: Occasional drink with dinner but not more than once a month.    Drug use: Never    Sexual activity: Yes     Partners: Male     Birth control/protection: Condom     Comment: I am  and have only ever had this current partner.   Other Topics Concern    Parent/sibling w/ CABG, MI or angioplasty before 65F 55M? No   Social History Narrative    Not on file     Social Determinants of Health     Financial Resource Strain: Not on file   Food Insecurity: Not on file   Transportation Needs: Not on file   Physical Activity: Not on file   Stress: Not on file   Social Connections: Not on file   Interpersonal Safety: Low Risk  (3/27/2024)    Interpersonal Safety     Do you feel physically and emotionally safe where you currently live?: Yes     Within the past 12 months, have you been hit, slapped, kicked or otherwise physically hurt by someone?: No     Within the past 12 months, have  you been humiliated or emotionally abused in other ways by your partner or ex-partner?: No   Housing Stability: Not on file     Social History     Socioeconomic History    Marital status:      Spouse name: Austyn    Number of children: 0    Highest education level: Bachelor's degree (e.g., BA, AB, BS)   Occupational History    Occupation: [employer]   Tobacco Use    Smoking status: Never     Passive exposure: Never    Smokeless tobacco: Never   Substance and Sexual Activity    Alcohol use: Not Currently     Comment: Occasional drink with dinner but not more than once a month.    Drug use: Never    Sexual activity: Yes     Partners: Male     Birth control/protection: Condom     Comment: I am  and have only ever had this current partner.   Other Topics Concern    Parent/sibling w/ CABG, MI or angioplasty before 65F 55M? No     Social Determinants of Health     Interpersonal Safety: Low Risk  (3/27/2024)    Interpersonal Safety     Do you feel physically and emotionally safe where you currently live?: Yes     Within the past 12 months, have you been hit, slapped, kicked or otherwise physically hurt by someone?: No     Within the past 12 months, have you been humiliated or emotionally abused in other ways by your partner or ex-partner?: No       Physical Exam:   Vitals:    04/18/24 1628 04/18/24 1640 04/18/24 1655 04/18/24 1710   BP:  (!) 160/100 (!) 152/88 (!) 157/86   Pulse:  112 113 105   Resp:       Temp:       TempSrc:       SpO2: 100% 100% 100% 100%   Weight:       Height:          Gen:  HEENT: Neck supple, no cervical lymphadenopathy; oral MMM  CV: RRR, no murmurs/rubs/gallops; peripheral pulses 2+ bilaterally  Pulm: CTAB, no increased work of breathing, no wheezing/rhonchi/crackles  Abd: non-tender, non-distended, +BS  Extremities: non-tender, no erythema; trace bilateral lower extremity edema    Labs: hgb 10.8, plt 295, cr .65, AST 17, ALT 14, Trop 7(normal)    A&P:  Indigo Romero  Deangelo is a 24 year old  who is PPD#8 from [facility] who now presents with postpartum severe preeclampsia. Will plan to admit to the postpartum unit for further care.     # Preeclampsia with severe features   Meets criteria by blood pressure, headaches  - Sustained severe range pressures in ED. S/p IV labetalol 20 mg. Following blood pressures mild range   - Continue serial blood pressure monitoring. Short acting IV antihypertensives for blood pressures systolic > 160 and/or diastolic > 110   - D#1 Nifedipine 30 mg. Titrate as indicated   - Labs: HELLP labs nl. Repeat AM HELLP labs   - Mg 4g loading dose > Mg 2g maintenance. Plan for 24hr magnesium. Collect Mg level as clinically indicated   - Symptoms:     -- Headache: low-grade per patient report. Has not tried any medical interventions yet. Hesitant to have head MRI at this time (as initially planned by ED), prefers to try medical therapy before escalating to imaging. Will plan for reglan and benadryl. If headache persists, would recommend further assessment with imaging.   - Q4hr Mag checks. Next  at 2130     # Postpartum Care   - Routine postpartum care   - Feeding: pumping for infant     Staffed with Dr. Dipika Callahan MS3  [employer]    Resident/Fellow Attestation   I, Alfreda Mac MD, was present with the medical/YVONNE student who participated in the service and in the documentation of the note.  I have verified the history and personally performed the physical exam and medical decision making.  I agree with the assessment and plan of care as documented in the note.        Alfreda Mac MD   Obstetrics and Gynecology, PGY-2  2024, 5:27 PM  Pager [phone]     Appreciate Dr. Mac and student doctor London's note above, patient also seen and examined by me separately from the resident team.  On my exam BP was in mild range and headache was stable. I agree with the note and plan of care detailed above.    Darling Bar MD

## 2024-04-18 NOTE — ED PROVIDER NOTES
"ED Provider Note  [facility]      History     Chief Complaint   Patient presents with     Pre-Eclampsia     High blood pressure in the setting of immediate postpartum period      HPI  Indigo Bright is a 24 year old female who presents for evaluation of dizziness and hypertension.  Patient is 1 week postpartum from a vacuum-assisted vaginal delivery.  She reports baby has been in the hospital for the past week, she has been sleeping very little.  Denies any history of preeclampsia during her pregnancy, although states immediately after delivery she was found to be hypertensive on one reading.  States her symptoms of dizziness slight headache started yesterday, checked her blood pressure and it was elevated.  Her symptoms worsened today and included \"seeing stars\", she checked her blood pressure again, this time initial readings were in the 170s systolic and after discussion with her OB team she was advised to present to the emergency department.  She has some bilateral lower extremity swelling however she is uncertain if this is new.  She also reports some right-sided abdominal discomfort.  She reports she generally feels very unwell but is uncertain if that was due to her sleep deprivation from having a new baby.  She denies any fever, chills, URI symptoms, generalized bodyaches or other infectious symptoms.  She reports her bleeding has significantly subsided.  She denies any urinary symptoms, nausea vomiting or diarrhea.    Past Medical History  Past Medical History:   Diagnosis Date     Hypertension 08/15/21     Past Surgical History:   Procedure Laterality Date     WISDOM TOOTH EXTRACTION       No current outpatient medications on file.    Allergies   Allergen Reactions     Seasonal Allergies Headache     Family History  Family History   Problem Relation Age of Onset     Hypertension Mother      Asthma Mother      Obesity Mother      Hypertension Father         High blood " "pressure is related to heavy weight     Obesity Father      Other Cancer Maternal Grandfather          of smoking related lung cancer     Hypertension Maternal Grandmother      Obesity Maternal Grandmother      Hypertension Paternal Grandmother      Cerebrovascular Disease Paternal Grandmother      Other Cancer Paternal Grandmother          of thyroid cancer     Thyroid Disease Paternal Grandmother      Depression Sister      Social History   Social History     Tobacco Use     Smoking status: Never     Passive exposure: Never     Smokeless tobacco: Never   Substance Use Topics     Alcohol use: Not Currently     Comment: Occasional drink with dinner but not more than once a month.     Drug use: Never         A medically appropriate review of systems was performed with pertinent positives and negatives noted in the HPI, and all other systems negative.    Physical Exam   BP: (!) 157/103  Pulse: 109  Temp: 99.4  F (37.4  C)  Resp: 18  Height: 160 cm (5' 3\")  Weight: 107 kg (236 lb)  SpO2: 100 %  Physical Exam  Vitals and nursing note reviewed.   Constitutional:       General: She is not in acute distress.     Appearance: Normal appearance. She is well-developed. She is ill-appearing. She is not toxic-appearing.   HENT:      Head: Normocephalic and atraumatic.   Eyes:      General: No scleral icterus.     Conjunctiva/sclera: Conjunctivae normal.   Cardiovascular:      Rate and Rhythm: Tachycardia present.      Pulses: Normal pulses.      Heart sounds: Normal heart sounds.      Comments: Hypertensive, mildly tachycardic  Pulmonary:      Effort: Pulmonary effort is normal. No respiratory distress.      Breath sounds: Normal breath sounds.   Abdominal:      General: Abdomen is flat.      Palpations: Abdomen is soft.      Tenderness: There is no abdominal tenderness. There is no guarding.   Musculoskeletal:         General: Normal range of motion.      Cervical back: Normal range of motion and neck supple.      Right " lower leg: Edema present.      Left lower leg: Edema present.   Skin:     General: Skin is warm and dry.      Capillary Refill: Capillary refill takes less than 2 seconds.      Findings: No rash.   Neurological:      General: No focal deficit present.      Mental Status: She is alert and oriented to person, place, and time. Mental status is at baseline.   Psychiatric:         Mood and Affect: Mood normal.         Behavior: Behavior normal.         Thought Content: Thought content normal.         Judgment: Judgment normal.           ED Course, Procedures, & Data     ED Course as of 04/19/24 0221   Thu Apr 18, 2024   1640      EKG Interpretation:     EKG Number: 1  Interpreted by ATUL Coombs CNP  Symptoms at time of EKG: dizziness, headache, HTN   Rhythm: sinus tachycardia  Rate: 110-120  Axis: NORMAL  Ectopy: none  Conduction: normal  ST Segments/ T Waves: No ST-T wave changes  Q Waves: none  Comparison to prior: No old EKG available    Clinical Impression: sinus tachycardia, otherwise normal EKG       Procedures          Results for orders placed or performed during the hospital encounter of 04/18/24   [place] Draw     Status: None    Narrative    The following orders were created for panel order [place] Draw.  Procedure                               Abnormality         Status                     ---------                               -----------         ------                     Extra Blue Top Tube[[id number]]                              Final result               Extra Red Top Tube[[id number]]                               Final result               Extra Green Top (Lithium...[[id number]]                      Final result               Extra Green Top (Lithium...[[id number]]                      Final result               Extra Purple Top Tube[[id number]]                            Final result               Extra Purple Top Tube[[id number]]                            Final result                 Please  view results for these tests on the individual orders.   Extra Blue Top Tube     Status: None   Result Value Ref Range    Hold Specimen JIC    Extra Red Top Tube     Status: None   Result Value Ref Range    Hold Specimen JIC    Extra Green Top (Lithium Heparin) Tube     Status: None   Result Value Ref Range    Hold Specimen     Extra Green Top (Lithium Heparin) Tube     Status: None   Result Value Ref Range    Hold Specimen     Extra Purple Top Tube     Status: None   Result Value Ref Range    Hold Specimen JIC    Extra Purple Top Tube     Status: None   Result Value Ref Range    Hold Specimen JIC    Lactic acid whole blood     Status: Normal   Result Value Ref Range    Lactic Acid 1.0 0.7 - 2.0 mmol/L   Comprehensive metabolic panel     Status: Abnormal   Result Value Ref Range    Sodium 141 135 - 145 mmol/L    Potassium 3.8 3.4 - 5.3 mmol/L    Carbon Dioxide (CO2) 24 22 - 29 mmol/L    Anion Gap 12 7 - 15 mmol/L    Urea Nitrogen 8.6 6.0 - 20.0 mg/dL    Creatinine 0.65 0.51 - 0.95 mg/dL    GFR Estimate >90 >60 mL/min/1.73m2    Calcium 8.7 8.6 - 10.0 mg/dL    Chloride 105 98 - 107 mmol/L    Glucose 94 70 - 99 mg/dL    Alkaline Phosphatase 252 (H) 40 - 150 U/L    AST 17 0 - 45 U/L    ALT 14 0 - 50 U/L    Protein Total 6.7 6.4 - 8.3 g/dL    Albumin 3.7 3.5 - 5.2 g/dL    Bilirubin Total 0.3 <=1.2 mg/dL   Magnesium     Status: Normal   Result Value Ref Range    Magnesium 1.9 1.7 - 2.3 mg/dL   Troponin T, High Sensitivity     Status: Normal   Result Value Ref Range    Troponin T, High Sensitivity 7 <=14 ng/L   CBC with platelets and differential     Status: Abnormal   Result Value Ref Range    WBC Count 7.3 4.0 - 11.0 10e3/uL    RBC Count 3.67 (L) 3.80 - 5.20 10e6/uL    Hemoglobin 10.8 (L) 11.7 - 15.7 g/dL    Hematocrit 32.3 (L) 35.0 - 47.0 %    MCV 88 78 - 100 fL    MCH 29.4 26.5 - 33.0 pg    MCHC 33.4 31.5 - 36.5 g/dL    RDW 13.9 10.0 - 15.0 %    Platelet Count 295 150 - 450 10e3/uL    % Neutrophils 75 %    %  Lymphocytes 14 %    % Monocytes 8 %    % Eosinophils 2 %    % Basophils 0 %    % Immature Granulocytes 1 %    NRBCs per 100 WBC 0 <1 /100    Absolute Neutrophils 5.5 1.6 - 8.3 10e3/uL    Absolute Lymphocytes 1.0 0.8 - 5.3 10e3/uL    Absolute Monocytes 0.6 0.0 - 1.3 10e3/uL    Absolute Eosinophils 0.1 0.0 - 0.7 10e3/uL    Absolute Basophils 0.0 0.0 - 0.2 10e3/uL    Absolute Immature Granulocytes 0.1 <=0.4 10e3/uL    Absolute NRBCs 0.0 10e3/uL   EKG 12-lead, tracing only     Status: None   Result Value Ref Range    Systolic Blood Pressure  mmHg    Diastolic Blood Pressure  mmHg    Ventricular Rate 113 BPM    Atrial Rate 113 BPM    MN Interval 148 ms    QRS Duration 82 ms     ms    QTc 433 ms    P Axis 57 degrees    R AXIS 51 degrees    T Axis 42 degrees    Interpretation ECG       Sinus tachycardia  Otherwise normal ECG  Unconfirmed report - interpretation of this ECG is computer generated - see medical record for final interpretation  Confirmed by - EMERGENCY ROOM, PHYSICIAN ([id number]),  DEB LEMA ([id number]) on 4/18/2024 4:49:03 PM     CBC with platelets differential     Status: Abnormal    Narrative    The following orders were created for panel order CBC with platelets differential.  Procedure                               Abnormality         Status                     ---------                               -----------         ------                     CBC with platelets and d...[[id number]]  Abnormal            Final result                 Please view results for these tests on the individual orders.     Medications   labetalol (NORMODYNE/TRANDATE) injection 20-80 mg (20 mg Intravenous $Given 4/18/24 2016)   hydrALAZINE (APRESOLINE) injection 10 mg (has no administration in time range)   lactated ringers infusion ( Intravenous $New Bag 4/18/24 9627)   calcium gluconate 10 % injection 1 g (has no administration in time range)   magnesium sulfate 4 g in 100 mL sterile water intermittent infusion (0 g  Intravenous Stopped 4/18/24 1734)     Followed by   magnesium sulfate infusion (2 g/hr Intravenous Rate/Dose Verify 4/18/24 1920)   acetaminophen (TYLENOL) oral liquid 650 mg (has no administration in time range)   NIFEdipine ER OSMOTIC (PROCARDIA XL) 24 hr tablet 60 mg (has no administration in time range)   ibuprofen (ADVIL/MOTRIN) tablet 600 mg (600 mg Oral $Given 4/19/24 0204)   acetaminophen (TYLENOL) tablet 650 mg (650 mg Oral $Given 4/19/24 0204)   metoclopramide (REGLAN) injection 10 mg (10 mg Intravenous $Given 4/18/24 1744)   diphenhydrAMINE (BENADRYL) injection 25 mg (25 mg Intravenous $Given 4/18/24 1744)   NIFEdipine ER OSMOTIC (PROCARDIA XL) 24 hr tablet 30 mg (30 mg Oral $Given 4/19/24 0205)     Labs Ordered and Resulted from Time of ED Arrival to Time of ED Departure   COMPREHENSIVE METABOLIC PANEL - Abnormal       Result Value    Sodium 141      Potassium 3.8      Carbon Dioxide (CO2) 24      Anion Gap 12      Urea Nitrogen 8.6      Creatinine 0.65      GFR Estimate >90      Calcium 8.7      Chloride 105      Glucose 94      Alkaline Phosphatase 252 (*)     AST 17      ALT 14      Protein Total 6.7      Albumin 3.7      Bilirubin Total 0.3     CBC WITH PLATELETS AND DIFFERENTIAL - Abnormal    WBC Count 7.3      RBC Count 3.67 (*)     Hemoglobin 10.8 (*)     Hematocrit 32.3 (*)     MCV 88      MCH 29.4      MCHC 33.4      RDW 13.9      Platelet Count 295      % Neutrophils 75      % Lymphocytes 14      % Monocytes 8      % Eosinophils 2      % Basophils 0      % Immature Granulocytes 1      NRBCs per 100 WBC 0      Absolute Neutrophils 5.5      Absolute Lymphocytes 1.0      Absolute Monocytes 0.6      Absolute Eosinophils 0.1      Absolute Basophils 0.0      Absolute Immature Granulocytes 0.1      Absolute NRBCs 0.0     LACTIC ACID WHOLE BLOOD - Normal    Lactic Acid 1.0     MAGNESIUM - Normal    Magnesium 1.9     TROPONIN T, HIGH SENSITIVITY - Normal    Troponin T, High Sensitivity 7     ROUTINE UA  WITH MICROSCOPIC REFLEX TO CULTURE   PROTEIN RANDOM URINE   INFLUENZA A/B, RSV, & SARS-COV2 PCR     No orders to display          Critical care was not performed.     Medical Decision Making  The patient's presentation was of high complexity (an acute health issue posing potential threat to life or bodily function).    The patient's evaluation involved:  review of external note(s) from 3+ sources (see separate area of note for details)  strong consideration of a test (MRI brain with and without contrast) that was ultimately deferred  ordering and/or review of 3+ test(s) in this encounter (see separate area of note for details)  discussion of management or test interpretation with another health professional (OB)    The patient's management necessitated high risk (a decision regarding hospitalization).    Assessment & Plan    Indigo Bright is a 24 year old female who presents for evaluation of dizziness and hypertension.  Upon arrival to the emergency department patient is unwell appearing but nontoxic.     Discussed with OB who evaluated patient in the emergency department and will admit patient to the hospital under their service.  They recommended starting blood pressure lowering medication with 20 of labetalol.  Remainder of protocol including hydralazine as needed if labetalol is not effective and magnesium 4 mg bolus followed by drip was ordered.  They also recommended MRI imaging to rule out PRES.    On reassessment patient's blood pressure did improve to 147/88 with a heart rate of 10 1:05 dose of IV labetalol 20mg and bolus of magnesium sulfate.  Patient given oral nifedipine.  Headache treated with Reglan and Benadryl per OB recommendation.  Patient preferred to hold off on MRI due to concern for over imaging, she stated that her headache was not that severe at this time.  After discussion again with OB they did recommend trying to treat the headache and if that does not work then sending for MRI  brain to rule out PRES.    EKG shows sinus tachycardia, no ectopy or arrhythmias, no ST or T wave changes, no QT prolongation.  Troponin normal.  Patient's labs remarkable for no leukocytosis, normal lactate, anemia has improved since delivery, hemoglobin currently 10.8.  She does have an elevation in alk phos, however this is also improved from 1 week ago.  No significant electrolyte or metabolic abnormalities.  UA and urine protein are pending at time of admission.    Patient was closely monitored during her time in the emergency department while receiving medication to treat her postpartum preeclampsia. will plan to admit the patient to the hospital under the OB service.    I have reviewed the nursing notes. I have reviewed the findings, diagnosis, plan and need for follow up with the patient.    Current Discharge Medication List          Final diagnoses:   Hypertension in pregnancy, preeclampsia, severe, postpartum condition   Headache   Vision changes   Dizziness       ATUL Coombs CNP   [facility] EMERGENCY DEPARTMENT  4/18/2024     Nathalia Champagne APRN CNP  04/19/24 0013  --    ED Attending Physician Attestation    I Yuri Ulrich MD, cared for this patient with the Advanced Practice Provider (YVONNE). I have performed a history and physical examination of the patient independent of the YVONNE. I reviewed the YVONNE's documentation above and agree with the documented findings and plan of care. I personally provided a substantive portion of the care for this patient, including the complete Medical Decision Making. Please see the YVONNE's documentation for full details.    Summary of HPI, PE, ED Course   Patient is a 24 year old female evaluated in the emergency department for elevated blood pressure with preeclamptic sx. Exam and ED course notable for htn noted with labs stable. Seen by OB also admit with Mag IV started immediately in the ER. patient stable. After the completion of care in the  emergency department, the patient was admitted to inpatient.            Yuri Ulrich MD  Emergency Medicine          Yuri Ulrich MD  04/19/24 0220

## 2024-04-19 LAB
ALBUMIN SERPL BCG-MCNC: 3.2 G/DL (ref 3.5–5.2)
ALP SERPL-CCNC: 212 U/L (ref 40–150)
ALT SERPL W P-5'-P-CCNC: 16 U/L (ref 0–50)
ANION GAP SERPL CALCULATED.3IONS-SCNC: 11 MMOL/L (ref 7–15)
AST SERPL W P-5'-P-CCNC: 20 U/L (ref 0–45)
BILIRUB SERPL-MCNC: 0.2 MG/DL
BUN SERPL-MCNC: 5.4 MG/DL (ref 6–20)
CALCIUM SERPL-MCNC: 7.1 MG/DL (ref 8.6–10)
CHLORIDE SERPL-SCNC: 104 MMOL/L (ref 98–107)
CREAT SERPL-MCNC: 0.58 MG/DL (ref 0.51–0.95)
DEPRECATED HCO3 PLAS-SCNC: 24 MMOL/L (ref 22–29)
EGFRCR SERPLBLD CKD-EPI 2021: >90 ML/MIN/1.73M2
ERYTHROCYTE [DISTWIDTH] IN BLOOD BY AUTOMATED COUNT: 14 % (ref 10–15)
GLUCOSE SERPL-MCNC: 96 MG/DL (ref 70–99)
HCT VFR BLD AUTO: 29.3 % (ref 35–47)
HGB BLD-MCNC: 9.5 G/DL (ref 11.7–15.7)
MCH RBC QN AUTO: 28.6 PG (ref 26.5–33)
MCHC RBC AUTO-ENTMCNC: 32.4 G/DL (ref 31.5–36.5)
MCV RBC AUTO: 88 FL (ref 78–100)
PLATELET # BLD AUTO: 287 10E3/UL (ref 150–450)
POTASSIUM SERPL-SCNC: 3.8 MMOL/L (ref 3.4–5.3)
PROT SERPL-MCNC: 6.2 G/DL (ref 6.4–8.3)
RBC # BLD AUTO: 3.32 10E6/UL (ref 3.8–5.2)
SODIUM SERPL-SCNC: 139 MMOL/L (ref 135–145)
WBC # BLD AUTO: 8 10E3/UL (ref 4–11)

## 2024-04-19 PROCEDURE — 258N000003 HC RX IP 258 OP 636: Performed by: NURSE PRACTITIONER

## 2024-04-19 PROCEDURE — 85048 AUTOMATED LEUKOCYTE COUNT: CPT

## 2024-04-19 PROCEDURE — 250N000013 HC RX MED GY IP 250 OP 250 PS 637

## 2024-04-19 PROCEDURE — 250N000013 HC RX MED GY IP 250 OP 250 PS 637: Performed by: STUDENT IN AN ORGANIZED HEALTH CARE EDUCATION/TRAINING PROGRAM

## 2024-04-19 PROCEDURE — 120N000002 HC R&B MED SURG/OB UMMC

## 2024-04-19 PROCEDURE — 99232 SBSQ HOSP IP/OBS MODERATE 35: CPT | Mod: GC | Performed by: STUDENT IN AN ORGANIZED HEALTH CARE EDUCATION/TRAINING PROGRAM

## 2024-04-19 PROCEDURE — 80053 COMPREHEN METABOLIC PANEL: CPT

## 2024-04-19 PROCEDURE — 36415 COLL VENOUS BLD VENIPUNCTURE: CPT

## 2024-04-19 RX ORDER — NIFEDIPINE 30 MG/1
60 TABLET, EXTENDED RELEASE ORAL EVERY 24 HOURS
Status: DISCONTINUED | OUTPATIENT
Start: 2024-04-19 | End: 2024-04-20 | Stop reason: HOSPADM

## 2024-04-19 RX ORDER — HYDROCORTISONE 25 MG/G
CREAM TOPICAL 3 TIMES DAILY PRN
Status: DISCONTINUED | OUTPATIENT
Start: 2024-04-19 | End: 2024-04-20 | Stop reason: HOSPADM

## 2024-04-19 RX ORDER — FUROSEMIDE 20 MG
20 TABLET ORAL DAILY
Status: DISCONTINUED | OUTPATIENT
Start: 2024-04-19 | End: 2024-04-20 | Stop reason: HOSPADM

## 2024-04-19 RX ORDER — SODIUM CHLORIDE, SODIUM LACTATE, POTASSIUM CHLORIDE, CALCIUM CHLORIDE 600; 310; 30; 20 MG/100ML; MG/100ML; MG/100ML; MG/100ML
INJECTION, SOLUTION INTRAVENOUS
Status: DISPENSED
Start: 2024-04-19 | End: 2024-04-19

## 2024-04-19 RX ORDER — ACETAMINOPHEN 325 MG/1
650 TABLET ORAL EVERY 4 HOURS PRN
Status: DISCONTINUED | OUTPATIENT
Start: 2024-04-19 | End: 2024-04-19

## 2024-04-19 RX ORDER — NIFEDIPINE 30 MG/1
30 TABLET, EXTENDED RELEASE ORAL ONCE
Status: COMPLETED | OUTPATIENT
Start: 2024-04-19 | End: 2024-04-19

## 2024-04-19 RX ORDER — LORATADINE 10 MG/1
10 TABLET ORAL DAILY
Status: DISCONTINUED | OUTPATIENT
Start: 2024-04-19 | End: 2024-04-20 | Stop reason: HOSPADM

## 2024-04-19 RX ORDER — DOCUSATE SODIUM 100 MG/1
100 CAPSULE, LIQUID FILLED ORAL DAILY PRN
Status: DISCONTINUED | OUTPATIENT
Start: 2024-04-19 | End: 2024-04-20 | Stop reason: HOSPADM

## 2024-04-19 RX ORDER — MODIFIED LANOLIN
OINTMENT (GRAM) TOPICAL
Status: DISCONTINUED | OUTPATIENT
Start: 2024-04-19 | End: 2024-04-20 | Stop reason: HOSPADM

## 2024-04-19 RX ORDER — ACETAMINOPHEN 325 MG/1
650 TABLET ORAL EVERY 6 HOURS PRN
Status: DISCONTINUED | OUTPATIENT
Start: 2024-04-19 | End: 2024-04-20 | Stop reason: HOSPADM

## 2024-04-19 RX ORDER — IBUPROFEN 800 MG/1
800 TABLET, FILM COATED ORAL EVERY 6 HOURS PRN
Status: DISCONTINUED | OUTPATIENT
Start: 2024-04-19 | End: 2024-04-19

## 2024-04-19 RX ORDER — PRENATAL VIT/IRON FUM/FOLIC AC 27MG-0.8MG
1 TABLET ORAL DAILY
Status: DISCONTINUED | OUTPATIENT
Start: 2024-04-19 | End: 2024-04-20 | Stop reason: HOSPADM

## 2024-04-19 RX ADMIN — ACETAMINOPHEN 650 MG: 325 TABLET, FILM COATED ORAL at 02:04

## 2024-04-19 RX ADMIN — IBUPROFEN 600 MG: 200 TABLET, FILM COATED ORAL at 02:04

## 2024-04-19 RX ADMIN — FUROSEMIDE 20 MG: 20 TABLET ORAL at 10:22

## 2024-04-19 RX ADMIN — SODIUM CHLORIDE, POTASSIUM CHLORIDE, SODIUM LACTATE AND CALCIUM CHLORIDE: 600; 310; 30; 20 INJECTION, SOLUTION INTRAVENOUS at 05:18

## 2024-04-19 RX ADMIN — NIFEDIPINE 60 MG: 30 TABLET, FILM COATED, EXTENDED RELEASE ORAL at 18:14

## 2024-04-19 RX ADMIN — IBUPROFEN 600 MG: 200 TABLET, FILM COATED ORAL at 09:13

## 2024-04-19 RX ADMIN — NIFEDIPINE 30 MG: 30 TABLET, FILM COATED, EXTENDED RELEASE ORAL at 02:05

## 2024-04-19 RX ADMIN — IBUPROFEN 600 MG: 200 TABLET, FILM COATED ORAL at 16:31

## 2024-04-19 ASSESSMENT — ACTIVITIES OF DAILY LIVING (ADL)
ADLS_ACUITY_SCORE: 35
ADLS_ACUITY_SCORE: 20
ADLS_ACUITY_SCORE: 35
ADLS_ACUITY_SCORE: 20
ADLS_ACUITY_SCORE: 20
ADLS_ACUITY_SCORE: 35
ADLS_ACUITY_SCORE: 36
ADLS_ACUITY_SCORE: 20
ADLS_ACUITY_SCORE: 20
ADLS_ACUITY_SCORE: 36
ADLS_ACUITY_SCORE: 36
ADLS_ACUITY_SCORE: 35
ADLS_ACUITY_SCORE: 35
ADLS_ACUITY_SCORE: 20
ADLS_ACUITY_SCORE: 35
ADLS_ACUITY_SCORE: 36
ADLS_ACUITY_SCORE: 35
ADLS_ACUITY_SCORE: 20
ADLS_ACUITY_SCORE: 35

## 2024-04-19 NOTE — PROVIDER NOTIFICATION
04/19/24 1523   Provider Notification   Provider Name/Title dr vega   Method of Notification Electronic Page     Pt requesting to take docusate 100 mg po which she brought in from home.  MD asked to place an order.  Also, pt expecting a greater urine output after lasix po given at 1015.  MD asked to review I/o.      Pharm will print pt label after colace is brought to central pharm for check.  Pt updated, that urine output is as expected after Lasix.  Pt encouraged to hydrate orally - per MDs.

## 2024-04-19 NOTE — PLAN OF CARE
VSS, 141/88 on admission to [facility], reports only headache 5/10 after receiving benadryl/reglan in ED.  States she 'fuzzy' during the loading dose of magnesium in the ED and that has since resolved.  Pt still encouraged to call for assistance with ambulation to the bathroom.  , Austyn, supportive at bedside.  Infant to be discharged this evening, from 6th floor peds, and will join couple in patient's room.  Patient denies questions or concerns at thie time, continue with plan of care.  MILENA Coto now assumes care of this patient.

## 2024-04-19 NOTE — PROVIDER NOTIFICATION
04/19/24 0149   Provider Notification   Provider Name/Title Purnima Tadeo   Method of Notification Phone   Request Evaluate-Remote   Notification Reason Medication Request     Patient is requesting ibuprofen for bilateral pain in engorged breast, pumping every few hours but filling quickly. BP still slightly elevated, no other s/s.     Provider responded- give tylenol and ibuprofen, one time dose nifed 30mg now and increased morning dose to 60mg, continue to monitor.

## 2024-04-19 NOTE — PROGRESS NOTES
Summary:  received report from Chica Melvin RN.  Pt  at [facility] on  - post partum day 8.  PreE.  On 2 gm magnesium sulfate started  at 1704 - readmit on .  Pt reports no s/s of PreE.  Edema +2 bilat lower legs/ankles/feet.  +2 reflexes.  Voiding - maintaining strict I/O.  VSS.  Pt c/o breast fullness/possible engorgement.  Breast feeding and pumping after.  Azucena had been a recent inpt here per parents.  Austyn is caring for Azucena - as not a pt.  Clarified orders with Dr Rodriguez/Dr Tanner.  Tolerating magnesium sulfate well.  Assistance provided as needed with ambulation to BR.  Pt declines SCDs.  Extra expressed breast milk is labeled with Azucena Deangelo labels (had been pt on 6th floor) or Indigo Bright and stored in nursery fridge.  LC consult released.  Pt updated on motrin/tylenol.  Call light is within reach and frequent roundings.      At noon, magnesium sulfate d/c'd as ordered.  IV s/L'd.  Pt reports no headache, vision changes, epigastric pain/SOB.  Continuing I/O - pt participating.  Lasix given as ordered at 1015.  Pt given ice for breast fullness.  Pumping frequently q 3 hrs.  LC to see today.  Pt tolerated magnesium sulfate well.  Seen by Dr ERICA Rodriguez this morning.  Encouraged expressed milk be sent home and placed in freezer if not used.  Indigo will update Austyn to bring cooler when he returns.

## 2024-04-19 NOTE — PROVIDER NOTIFICATION
04/19/24 0900   Provider Notification   Provider Name/Title Dr Tanner   Method of Notification Electronic Page     0900 /92  FYI

## 2024-04-19 NOTE — PLAN OF CARE
Postpartum assessments within normal limits. VSS.  Pt denies headache, vision changes, SOB, RUQ pain. Reflexes 2+ average, no clonus. Magnesium Sulfate infusing at 2g/hr, no s/s toxicity. Pt is pumping independently. Reports good pain management with tylenol and ibuprofen. Educated patient to pump frequently to relieve engorgement pain. Support person present. Plan of care ongoing, no concerns as of present.      Goal Outcome Evaluation:  Problem: Hypertensive Disorders in Pregnancy  Goal: Patient-Fetal Stabilization  Outcome: Progressing     Problem: Adult Inpatient Plan of Care  Goal: Optimal Comfort and Wellbeing  Outcome: Progressing  Intervention: Monitor Pain and Promote Comfort  Recent Flowsheet Documentation  Taken 4/19/2024 0304 by Chica Melvin RN  Pain Management Interventions:   medication (see MAR)   heat applied  Taken 4/19/2024 0204 by Chica Melvin RN  Pain Management Interventions:   medication (see MAR)   heat applied  Intervention: Provide Person-Centered Care  Recent Flowsheet Documentation  Taken 4/18/2024 2044 by Chica Melvin RN  Trust Relationship/Rapport:   care explained   choices provided   questions answered   questions encouraged   thoughts/feelings acknowledged

## 2024-04-19 NOTE — PROGRESS NOTES
Gynecology Progress Note  Magnesium Check    Note entry delayed by other patient cares - patient seen at 0700.     S: Doing well this AM, just feeling a little woozy with Mg. HA has resolved. No vision changes, CP, SOB, RUQ pain. Edema stable. Otherwise doing well from post partum perspective. Agreeable with plan for Mg for 24 hours and then monitoring for 24h after that.     O:  Patient Vitals for the past 24 hrs:   BP Temp Temp src Pulse Resp SpO2 Height Weight   04/19/24 1157 126/83 -- -- 93 16 99 % -- --   04/19/24 1059 129/81 -- -- 92 16 99 % -- --   04/19/24 1001 139/89 -- -- 92 16 99 % -- --   04/19/24 0900 (!) 131/92 -- -- 88 16 99 % -- --   04/19/24 0758 132/80 97.9  F (36.6  C) Oral 92 16 100 % -- --   04/19/24 0652 125/74 -- -- 78 14 97 % -- 103.5 kg (228 lb 2.8 oz)   04/19/24 0550 126/75 -- -- 82 -- 97 % -- --   04/19/24 0450 123/61 -- -- 86 -- 98 % -- --   04/19/24 0354 129/80 -- -- 91 -- 98 % -- --   04/19/24 0254 137/79 98.3  F (36.8  C) Oral 101 16 100 % -- --   04/19/24 0147 (!) 151/90 -- -- 105 -- 100 % -- --   04/19/24 0045 (!) 143/89 -- -- 110 -- 100 % -- --   04/18/24 2322 132/84 -- -- 99 -- 100 % -- --   04/18/24 2252 136/83 -- -- 100 -- 100 % -- --   04/18/24 2222 137/78 -- -- 106 -- 100 % -- --   04/18/24 2207 (!) 150/86 -- -- 110 -- 100 % -- --   04/18/24 2141 (!) 140/87 98.6  F (37  C) Oral 107 16 100 % -- --   04/18/24 2126 134/82 -- -- 106 -- 100 % -- --   04/18/24 2116 (!) 151/89 -- -- -- -- 100 % -- --   04/18/24 2106 (!) 146/85 -- -- 111 -- -- -- --   04/18/24 2056 (!) 144/85 -- -- 103 -- 99 % -- --   04/18/24 2046 (!) 155/84 -- -- -- -- 100 % -- --   04/18/24 2036 (!) 151/92 -- -- 109 -- 99 % -- --   04/18/24 2026 (!) 162/97 -- -- 112 -- 99 % -- --   04/18/24 2005 (!) 162/95 -- -- 117 18 99 % -- --   04/18/24 1949 (!) 160/94 -- -- 115 16 99 % -- --   04/18/24 1847 (!) 141/88 99.4  F (37.4  C) Oral 105 16 99 % -- --   04/18/24 1830 (!) 140/88 -- -- -- 14 99 % -- --   04/18/24 1810 (!)  "141/87 -- -- 104 -- 100 % -- --   24 1755 (!) 147/88 -- -- 105 -- 98 % -- --   24 1725 (!) 144/84 -- -- 104 16 100 % -- --   24 1710 (!) 157/86 -- -- 105 -- 100 % -- --   24 1655 (!) 152/88 -- -- 113 -- 100 % -- --   24 1640 (!) 160/100 -- -- 112 -- 100 % -- --   24 1628 -- -- -- -- -- 100 % -- --   24 1625 (!) 166/97 -- -- -- -- -- -- --   24 1617 (!) 157/103 99.4  F (37.4  C) Oral 109 18 100 % 1.6 m (5' 3\") 107 kg (236 lb)     Wt Readings from Last 4 Encounters:   24 103.5 kg (228 lb 2.8 oz)   24 108.6 kg (239 lb 6.4 oz)   04/10/24 112.9 kg (249 lb)   24 112 kg (247 lb)     Gen:  Resting comfortably, NAD  CV:  RRR  Pulm:  NWOB, CTAB  Abd:  Soft, non-tender   Ext:  Non-tender, 2+ LE edema b/l    UOP:   Intake/Output Summary (Last 24 hours) at 2024 1445  Last data filed at 2024 1025  Gross per 24 hour   Intake 3552 ml   Output 4700 ml   Net -1148 ml     PreE Labs:  PreE labs remain normal this AM  Hgb 9.5    A/P:  Indigo Bright is a 24 year old  who is PPD#8 s/p VAVD. Admitted for PP pre-e w/ SF by BP criteria.    PreE w/ SF (BP):  - BPs mild range, Nifedipine increased to 60mg; Continue serial BP monitoring  - Immediate acting antihypertensives PRN  - Asymptomatic, headache resolved  - HELLP labs wnl  - S/p Mag 4g load> 2g/hr;  No s/sx of mag toxicity  - Strict I&O; UOP adequate  - Continue q4h clinical mag checks, next at 1000.     Chica Tanner MD  OB/GYN Resident PGY-4        "

## 2024-04-19 NOTE — PROVIDER NOTIFICATION
04/18/24 2034   Provider Notification   Provider Name/Title Purnima Tadeo   Method of Notification Phone   Request Evaluate-Remote   Notification Reason Status Update     Elevated BP x2 hypertensive algorithm started first dose of labetolol given.   Provider responded- continue to monitor and follow protocol.

## 2024-04-19 NOTE — PROVIDER NOTIFICATION
04/19/24 0842   Provider Notification   Provider Name/Title Dr ERICA Rodriguez   Method of Notification Electronic Page     Sent message requesting diet order and lactation consultant orders.  Requested clarification of covid/urine orders - d/c'd per Dr Rodriguez.

## 2024-04-19 NOTE — LACTATION NOTE
"Lactation Follow Up Note    Reason for visit/ call/ message:  Indigo admitted with preeclampsia after ER visit last evening.     Supply:  Getting about 5 to 6 ounces per pump (two full bottles plus 20-30 mL more) every 3 hours.     Significant changes (medications, equipment, comfort, etc):  Indigo was trying to work out the maharaj areas in breasts thus taking a little longer than 15 minutes, but reports milk flows quite easily with pumping.     Skin to skin/ nuzzling/ latching:  They had successful feeding yesterday afternoon with nipple shield though between baby's hospitalization and mom's readmission they've not had much opportunity to work on latching together.     Education given:  Reviewed importance of pump frequency though suggest \"capping\" supply no more than 5 ounces per pump as long as comfortably softened. Encouraged only using light touch massage, avoiding anything vigorous to decrease inflammation. Reviewed benefit of cold packs to breasts PRN (15-20 minutes at a time helpful) and ibuprofen as ordered by provider to help minimize swelling and inflammation, & avoid heat to breasts for now throughout this initial engorgement.   Discussed long term milk supply goals, she is already at a one month excellent supply level. Reviewed that cutting back much too early could inhibit longer term production, so suggest maintaining supply she has though try to avoid increasing it further.     Plan:  Continue pumping 8 times daily, try capping off when comfortably softened suggest no more than 5 ounces per pump as long as that is not uncomfortable. Use cold packs 15-20 minutes at a time and continue taking ibuprofen as ordered for comfort and anti inflammatory. Oncoming LC to come in and check fit of pump.   Encouraged to call when baby here for feeding support any time 7am-11pm LC here all weekend. RN's here at other times can also support with latching.   Continue to follow for lactation support throughout this " admission.         Angelica Marie RN, IBCLC   Lactation Consultant  Vocera: [facility] [phone]  Office: [phone]

## 2024-04-19 NOTE — PROGRESS NOTES
"Magnesium Check  S:  Patient reports feeling fine. Feels a little \"off\" on the mag. Headache has resolved. Denies vision changes, chest pain, shortness of breath, RUQ pain..    O:  Patient Vitals for the past 24 hrs:   BP Temp Temp src Pulse Resp SpO2 Height Weight   04/19/24 0900 (!) 131/92 -- -- 88 16 99 % -- --   04/19/24 0758 132/80 97.9  F (36.6  C) Oral 92 16 100 % -- --   04/19/24 0652 125/74 -- -- 78 14 97 % -- 103.5 kg (228 lb 2.8 oz)   04/19/24 0550 126/75 -- -- 82 -- 97 % -- --   04/19/24 0450 123/61 -- -- 86 -- 98 % -- --   04/19/24 0354 129/80 -- -- 91 -- 98 % -- --   04/19/24 0254 137/79 98.3  F (36.8  C) Oral 101 16 100 % -- --   04/19/24 0147 (!) 151/90 -- -- 105 -- 100 % -- --   04/19/24 0045 (!) 143/89 -- -- 110 -- 100 % -- --   04/18/24 2322 132/84 -- -- 99 -- 100 % -- --   04/18/24 2252 136/83 -- -- 100 -- 100 % -- --   04/18/24 2222 137/78 -- -- 106 -- 100 % -- --   04/18/24 2207 (!) 150/86 -- -- 110 -- 100 % -- --   04/18/24 2141 (!) 140/87 98.6  F (37  C) Oral 107 16 100 % -- --   04/18/24 2126 134/82 -- -- 106 -- 100 % -- --   04/18/24 2116 (!) 151/89 -- -- -- -- 100 % -- --   04/18/24 2106 (!) 146/85 -- -- 111 -- -- -- --   04/18/24 2056 (!) 144/85 -- -- 103 -- 99 % -- --   04/18/24 2046 (!) 155/84 -- -- -- -- 100 % -- --   04/18/24 2036 (!) 151/92 -- -- 109 -- 99 % -- --   04/18/24 2026 (!) 162/97 -- -- 112 -- 99 % -- --   04/18/24 2005 (!) 162/95 -- -- 117 18 99 % -- --   04/18/24 1949 (!) 160/94 -- -- 115 16 99 % -- --   04/18/24 1847 (!) 141/88 99.4  F (37.4  C) Oral 105 16 99 % -- --   04/18/24 1830 (!) 140/88 -- -- -- 14 99 % -- --   04/18/24 1810 (!) 141/87 -- -- 104 -- 100 % -- --   04/18/24 1755 (!) 147/88 -- -- 105 -- 98 % -- --   04/18/24 1725 (!) 144/84 -- -- 104 16 100 % -- --   04/18/24 1710 (!) 157/86 -- -- 105 -- 100 % -- --   04/18/24 1655 (!) 152/88 -- -- 113 -- 100 % -- --   04/18/24 1640 (!) 160/100 -- -- 112 -- 100 % -- --   04/18/24 1628 -- -- -- -- -- 100 % -- -- " "  24 1625 (!) 166/97 -- -- -- -- -- -- --   24 1617 (!) 157/103 99.4  F (37.4  C) Oral 109 18 100 % 1.6 m (5' 3\") 107 kg (236 lb)     Wt Readings from Last 4 Encounters:   24 103.5 kg (228 lb 2.8 oz)   24 108.6 kg (239 lb 6.4 oz)   04/10/24 112.9 kg (249 lb)   24 112 kg (247 lb)     Gen:  Resting comfortably, NAD  CV:  RRR  Pulm:  NWOB, CTAB  Abd:  Soft, non-tender   Ext:  Non-tender, 2+ LE edema b/l  Neuro: 2+ patellar reflexes, no beats clonus    UOP:     Intake/Output Summary (Last 24 hours) at 2024 1000  Last data filed at 2024 0800  Gross per 24 hour   Intake 2376 ml   Output 3550 ml   Net -1174 ml       PreE Labs:  PreE labs remain normal this AM  Hgb 9.5    A/P:  Indigo Bright is a 24 year old  who is PPD#8 s/p VAVD. Admitted for PP pre-e w/ SF by Bp criteria.    PreE w/ SF (BP):  - BPs mild range, Nifedipine increased to 60mg; Continue serial BP monitoring  - Immediate acting antihypertensives PRN  - Asymptomatic, headache resolved  - HELLP labs wnl  - S/p Mag 4g load> 2g/hr;  No s/sx of mag toxicity  - Strict I&O; UOP adequate  - Continue q4h clinical mag checks, next at 0700.     Misti Holly MD  Obgyn Resident   PGY-3    I personally examined and evaluated Indigo Bright on 2024.  I discussed the patient with Dr. Holly and agree with the presentation, exam and plan of care documented in this note with edits by me.   Indigo is overall doing ok. Discussed mixed data on timing of magnesium infusions especially >7 days postpartum and agreed to stop at noon today. Significant edema present and will give lasix x1. Has milk oversupply currently, would like visit from lactation today. preE labs normal this AM. Nifedipine at 60mg but can increase if >140/90. Anticipate discharge tomorrow or .  Alice Rodriguez MD       "

## 2024-04-19 NOTE — PROVIDER NOTIFICATION
"   04/19/24 1627   Provider Notification   Provider Name/Title dr vega   Method of Notification Electronic Page     Updated on /92 and P 99.  Pt reports HA as \"2\" - motrin just given. Per MD, give nifedipine at scheduled 1800 time. Updated that per pharm, pt needs to use hospital colace not her own unless extenuating circumstances.  Pt updated.     "

## 2024-04-19 NOTE — PROVIDER NOTIFICATION
04/19/24 0756   Provider Notification   Provider Name/Title Dr Tanner   Method of Notification Electronic Page     Pt requesting to order breakfast.  Needs diet orders.  Pt reports feeling engorged.  Sent message requesting lactation consultant order.

## 2024-04-20 ENCOUNTER — QUESTIONNAIRE SERIES SUBMISSION (OUTPATIENT)
Dept: OTHER | Age: 24
End: 2024-04-20

## 2024-04-20 ENCOUNTER — HOSPITAL ENCOUNTER (EMERGENCY)
Facility: CLINIC | Age: 24
Discharge: HOME | End: 2024-04-20
Attending: EMERGENCY MEDICINE | Admitting: EMERGENCY MEDICINE
Payer: COMMERCIAL

## 2024-04-20 VITALS
BODY MASS INDEX: 40.2 KG/M2 | OXYGEN SATURATION: 99 % | WEIGHT: 226.85 LBS | HEIGHT: 63 IN | SYSTOLIC BLOOD PRESSURE: 134 MMHG | TEMPERATURE: 98.1 F | HEART RATE: 80 BPM | RESPIRATION RATE: 16 BRPM | DIASTOLIC BLOOD PRESSURE: 82 MMHG

## 2024-04-20 VITALS
OXYGEN SATURATION: 98 % | RESPIRATION RATE: 23 BRPM | DIASTOLIC BLOOD PRESSURE: 78 MMHG | TEMPERATURE: 98.4 F | SYSTOLIC BLOOD PRESSURE: 145 MMHG | HEART RATE: 71 BPM

## 2024-04-20 DIAGNOSIS — Z87.59 HISTORY OF PRE-ECLAMPSIA: ICD-10-CM

## 2024-04-20 PROBLEM — O14.10 PREECLAMPSIA, SEVERE: Status: ACTIVE | Noted: 2024-04-20

## 2024-04-20 LAB
ALBUMIN SERPL BCG-MCNC: 3.8 G/DL (ref 3.5–5.2)
ALP SERPL-CCNC: 202 U/L (ref 40–150)
ALT SERPL W P-5'-P-CCNC: 15 U/L (ref 0–50)
ANION GAP SERPL CALCULATED.3IONS-SCNC: 13 MMOL/L (ref 7–15)
AST SERPL W P-5'-P-CCNC: 17 U/L (ref 0–45)
BASOPHILS # BLD AUTO: 0.1 10E3/UL (ref 0–0.2)
BASOPHILS NFR BLD AUTO: 0 %
BILIRUB DIRECT SERPL-MCNC: <0.2 MG/DL (ref 0–0.3)
BILIRUB SERPL-MCNC: 0.2 MG/DL
BUN SERPL-MCNC: 10.4 MG/DL (ref 6–20)
CALCIUM SERPL-MCNC: 9.4 MG/DL (ref 8.6–10)
CHLORIDE SERPL-SCNC: 105 MMOL/L (ref 98–107)
CREAT SERPL-MCNC: 0.63 MG/DL (ref 0.51–0.95)
DEPRECATED HCO3 PLAS-SCNC: 22 MMOL/L (ref 22–29)
EGFRCR SERPLBLD CKD-EPI 2021: >90 ML/MIN/1.73M2
EOSINOPHIL # BLD AUTO: 0.2 10E3/UL (ref 0–0.7)
EOSINOPHIL NFR BLD AUTO: 1 %
ERYTHROCYTE [DISTWIDTH] IN BLOOD BY AUTOMATED COUNT: 13.7 % (ref 10–15)
GLUCOSE SERPL-MCNC: 87 MG/DL (ref 70–99)
HCT VFR BLD AUTO: 30.2 % (ref 35–47)
HGB BLD-MCNC: 10.3 G/DL (ref 11.7–15.7)
IMM GRANULOCYTES # BLD: 0.1 10E3/UL
IMM GRANULOCYTES NFR BLD: 1 %
LYMPHOCYTES # BLD AUTO: 2.2 10E3/UL (ref 0.8–5.3)
LYMPHOCYTES NFR BLD AUTO: 18 %
MCH RBC QN AUTO: 29.6 PG (ref 26.5–33)
MCHC RBC AUTO-ENTMCNC: 34.1 G/DL (ref 31.5–36.5)
MCV RBC AUTO: 87 FL (ref 78–100)
MONOCYTES # BLD AUTO: 0.8 10E3/UL (ref 0–1.3)
MONOCYTES NFR BLD AUTO: 6 %
NEUTROPHILS # BLD AUTO: 8.8 10E3/UL (ref 1.6–8.3)
NEUTROPHILS NFR BLD AUTO: 73 %
NRBC # BLD AUTO: 0 10E3/UL
NRBC BLD AUTO-RTO: 0 /100
PLATELET # BLD AUTO: 347 10E3/UL (ref 150–450)
POTASSIUM SERPL-SCNC: 4.1 MMOL/L (ref 3.4–5.3)
PROT SERPL-MCNC: 6.8 G/DL (ref 6.4–8.3)
RBC # BLD AUTO: 3.48 10E6/UL (ref 3.8–5.2)
SODIUM SERPL-SCNC: 140 MMOL/L (ref 135–145)
WBC # BLD AUTO: 12 10E3/UL (ref 4–11)

## 2024-04-20 PROCEDURE — 36415 COLL VENOUS BLD VENIPUNCTURE: CPT | Performed by: EMERGENCY MEDICINE

## 2024-04-20 PROCEDURE — 96374 THER/PROPH/DIAG INJ IV PUSH: CPT

## 2024-04-20 PROCEDURE — 93005 ELECTROCARDIOGRAM TRACING: CPT | Performed by: EMERGENCY MEDICINE

## 2024-04-20 PROCEDURE — 250N000013 HC RX MED GY IP 250 OP 250 PS 637

## 2024-04-20 PROCEDURE — 99284 EMERGENCY DEPT VISIT MOD MDM: CPT | Mod: 25

## 2024-04-20 PROCEDURE — 250N000013 HC RX MED GY IP 250 OP 250 PS 637: Performed by: STUDENT IN AN ORGANIZED HEALTH CARE EDUCATION/TRAINING PROGRAM

## 2024-04-20 PROCEDURE — 82248 BILIRUBIN DIRECT: CPT | Performed by: EMERGENCY MEDICINE

## 2024-04-20 PROCEDURE — 250N000011 HC RX IP 250 OP 636: Performed by: EMERGENCY MEDICINE

## 2024-04-20 PROCEDURE — 85025 COMPLETE CBC W/AUTO DIFF WBC: CPT | Performed by: EMERGENCY MEDICINE

## 2024-04-20 PROCEDURE — 80053 COMPREHEN METABOLIC PANEL: CPT | Performed by: EMERGENCY MEDICINE

## 2024-04-20 PROCEDURE — 250N000013 HC RX MED GY IP 250 OP 250 PS 637: Performed by: EMERGENCY MEDICINE

## 2024-04-20 RX ORDER — LABETALOL 100 MG/1
200 TABLET, FILM COATED ORAL ONCE
Status: COMPLETED | OUTPATIENT
Start: 2024-04-20 | End: 2024-04-20

## 2024-04-20 RX ORDER — LABETALOL 200 MG/1
200 TABLET, FILM COATED ORAL 3 TIMES DAILY
Qty: 30 TABLET | Refills: 0 | Status: SHIPPED | OUTPATIENT
Start: 2024-04-20 | End: 2024-04-25

## 2024-04-20 RX ORDER — ONDANSETRON 2 MG/ML
4 INJECTION INTRAMUSCULAR; INTRAVENOUS ONCE
Status: COMPLETED | OUTPATIENT
Start: 2024-04-20 | End: 2024-04-20

## 2024-04-20 RX ORDER — ONDANSETRON 4 MG/1
4 TABLET, ORALLY DISINTEGRATING ORAL EVERY 8 HOURS PRN
Qty: 12 TABLET | Refills: 0 | Status: SHIPPED | OUTPATIENT
Start: 2024-04-20 | End: 2024-04-25

## 2024-04-20 RX ADMIN — PRENATAL VIT W/ FE FUMARATE-FA TAB 27-0.8 MG 1 TABLET: 27-0.8 TAB at 08:02

## 2024-04-20 RX ADMIN — LABETALOL HYDROCHLORIDE 200 MG: 100 TABLET, FILM COATED ORAL at 22:13

## 2024-04-20 RX ADMIN — FUROSEMIDE 20 MG: 20 TABLET ORAL at 08:02

## 2024-04-20 RX ADMIN — ONDANSETRON 4 MG: 2 INJECTION INTRAMUSCULAR; INTRAVENOUS at 21:57

## 2024-04-20 RX ADMIN — IBUPROFEN 600 MG: 200 TABLET, FILM COATED ORAL at 07:00

## 2024-04-20 ASSESSMENT — ACTIVITIES OF DAILY LIVING (ADL)
ADLS_ACUITY_SCORE: 20
ADLS_ACUITY_SCORE: 35
ADLS_ACUITY_SCORE: 20
ADLS_ACUITY_SCORE: 20
ADLS_ACUITY_SCORE: 35
ADLS_ACUITY_SCORE: 20
ADLS_ACUITY_SCORE: 35
ADLS_ACUITY_SCORE: 20

## 2024-04-20 ASSESSMENT — COLUMBIA-SUICIDE SEVERITY RATING SCALE - C-SSRS
1. IN THE PAST MONTH, HAVE YOU WISHED YOU WERE DEAD OR WISHED YOU COULD GO TO SLEEP AND NOT WAKE UP?: NO
6. HAVE YOU EVER DONE ANYTHING, STARTED TO DO ANYTHING, OR PREPARED TO DO ANYTHING TO END YOUR LIFE?: NO
2. HAVE YOU ACTUALLY HAD ANY THOUGHTS OF KILLING YOURSELF IN THE PAST MONTH?: NO

## 2024-04-20 NOTE — PROGRESS NOTES
"Summary:  received bedside report from Antoinette KUMAR RN.  Pt rounded on by Dr Preston this morning.  VSS.  Assessments WNL.  Reports dull, intermittent HA as \"2\".  Had motrin at 0700.  Pt not requesting further meds.  Dr ROCHE Updated via [employer].  Pt reports no dizziness when ambulating in room.  Participating in I/O.  Azucena and Austyn are present.  Indigo is reporting less breast fullness/discomfort.  States NB able to Br fed overnight.  Doing less pumping.  Parents encouraged to take expressed milk home to place in freezer.  Expressed milk has either Azucena's label - as recent peds pt or mother's label - all in nursery fridge.  Pt would like to discharge to home today.  Reports has BP cuff at home already.  Magnesium sulfate was d/c'd yesterday at 1200 and last scheduled nifidepine yesterday at 1814.  Scheduled lasix given this morning.  Edema bilat lower legs/ankles/feet and hands. Po hydration encouraged. Call light is within reach.    "

## 2024-04-20 NOTE — PROVIDER NOTIFICATION
04/20/24 1224   Provider Notification   Provider Name/Title Dr Brink   Method of Notification Electronic Page     Updated on noon VS.  Pt will enroll in [facility].  Dr ACOSTA Placed discharge to home order.

## 2024-04-20 NOTE — PROVIDER NOTIFICATION
"   04/20/24 0829   Provider Notification   Provider Name/Title Dr Carter   Method of Notification Electronic Page     FYI pt reporting dull, intermittent HA \"2\".  Given motrin at 0700.    "

## 2024-04-20 NOTE — PROGRESS NOTES
Summary:  pt discharged to home at 1342 in stable and comfortable condition.  Pt has enrolled in the [facility] BP program.  Verified prior to discharge.  Reviewed [facility] program - given written info.  States understanding on the pamphlets/written instructions.  Teach back used.  States has BP cuff at home and has used during the pregnancy.  States no further education needed.  Reviewed follow up with either the clinic on Monday or with home care - pt states understanding.  Given and reviewed nifedipine Rx.  States understanding.  Indigo states understanding of when to seek care, warning signs, resources, follow up and the [facility] program. Verified with parents that each expressed bottle of breast milk matches Indigo and/or Azucena full names.  Pt states all belongings with her at time of discharge.  Pt requested to ambulate from hospital and not use transport.  Indigo home with Miya - in stable condition and comfortable.

## 2024-04-20 NOTE — LACTATION NOTE
Lactation Follow Up Note    Reason for visit/ call/ message:  Follow up on engorgement, offer support with latching    Supply:  Breastfeeding more often now so not as sure, though 4 ounces was comfortable to remove with last pump.     Significant changes (medications, equipment, comfort, etc):  Indigo reports breast fullness improving, and feeds much improved. Azucena latches readily now with nipple shield and empties breast quite well. One side felt fully relieved, second side she took less so mom did pump for a few minutes as they were visibly different sizes.     Skin to skin/ nuzzling/ latching:  Breastfeeding well independently this morning, mom relieved she's transferring well and infant relaxed with arms at sides, sleeping soundly after feeding with no rooting response when lips touched for oral exam.     Education given:  Reviewed that supply will down-regulate naturally with breastfeeding instead of pumping. Normal filling & emptying with exclusive breastfeeding, that will be ok to leave one side slightly maharaj if she takes less (as long as it's not uncomfortable).   Offer to support with latch practice without nipple shield today before they go home so they have tools to use intermittently this week trying direct latching. Also normalize with Azucena establishing bottles first, and now consistently feeding at breast using shield it's fully ok to continue using shield until breastfeeding is also well established. Recommend follow up with  outpatient at their [facility] location within a week to support in weaning from nipple shield.     Plan:  Breastfeeding on cue when baby here, left our Rubina number for support to latch without shield if desired while here today.         Angelica Marie RN, IBCLC   Lactation Consultant  Rubina: [facility] [phone]  Office: [phone]

## 2024-04-20 NOTE — PLAN OF CARE
Goal Outcome Evaluation:      Plan of Care Reviewed With: patient    Overall Patient Progress: improvingOverall Patient Progress: improving    Patient is doing well this evening.  She has been vitally stable.  No elevated BPs. No pre-e symptoms. Intake and output charted.  Baby and spouse were visiting [facility].  She  baby once otherwise has been pumping, she has great milk supply. Edema unchanged.

## 2024-04-20 NOTE — PROGRESS NOTES
Gynecology Progress Note      S: doing well, feeling much improved from admit.  Denies HA, SOB/CP, n/v/d, RUQ pain, changes in edema  Lochia lessening. BF.    O:  Patient Vitals for the past 24 hrs:   BP Temp Temp src Pulse Resp SpO2 Weight   24 0754 123/75 98.1  F (36.7  C) Oral 90 16 -- --   24 0700 127/77 98.1  F (36.7  C) Oral 93 18 -- --   24 0500 -- -- -- -- -- -- 102.9 kg (226 lb 13.7 oz)   24 0258 125/77 98.1  F (36.7  C) Oral 87 18 -- --   24 2221 125/71 98.3  F (36.8  C) Oral 100 18 -- --   24 1811 135/84 -- -- 100 16 -- --   24 1611 (!) 129/92 98.2  F (36.8  C) Oral 99 16 -- --   24 1157 126/83 -- -- 93 16 99 % --   24 1059 129/81 -- -- 92 16 99 % --   24 1001 139/89 -- -- 92 16 99 % --     Wt Readings from Last 4 Encounters:   24 102.9 kg (226 lb 13.7 oz)   24 108.6 kg (239 lb 6.4 oz)   04/10/24 112.9 kg (249 lb)   24 112 kg (247 lb)     Gen:  Standing up, NAD  CV:  RRR  Pulm:  NWOB, CTAB  Abd:  Soft, non-tender   Ext:  Non-tender, 2+ LE edema b/l    UOP:   Intake/Output Summary (Last 24 hours) at 2024 1445  Last data filed at 2024 1025  Gross per 24 hour   Intake 3552 ml   Output 4700 ml   Net -1148 ml     PreE Labs:  PreE labs normal x 2 days on admit  Hgb 9.5    A/P:  Indigo Bright is a 24 year old  who is PPD#9 s/p VAVD. Re-admitted for PP pre-e w/ SF by BP criteria.    PreE w/ SF (BP):  - BPs mild range, Nifedipine 60mg qPM; Continue serial BP monitoring  - Immediate acting antihypertensives PRN  - Asymptomatic, headache resolved  - HELLP labs wnl  - S/p Mag 4g load> 2g/hr;  No s/sx of mag toxicity  - Strict I&O; UOP adequate  - s/p Lasix x 2    If BP stable throughout AM today, possible discharge later today with [facility] BP    Maylin Brink MD MPH

## 2024-04-20 NOTE — DISCHARGE INSTRUCTIONS
Warning Signs after Having a Baby    Keep this paper on your fridge or somewhere else where you can see it.    Call your provider if you have any of these symptoms up to 12 weeks after having your baby.    Thoughts of hurting yourself or your baby  Pain in your chest or trouble breathing  Severe headache not helped by pain medicine  Eyesight concerns (blurry vision, seeing spots or flashes of light, other changes to eyesight)  Fainting, shaking or other signs of a seizure    Call 9-1-1 if you feel that it is an emergency.     The symptoms below can happen to anyone after giving birth. They can be very serious. Call your provider if you have any of these warning signs.    My provider s phone number: _______________________    Losing too much blood (hemorrhage)    Call your provider if you soak through a pad in less than an hour or pass blood clots bigger than a golf ball. These may be signs that you are bleeding too much.    Blood clots in the legs or lungs    After you give birth, your body naturally clots its blood to help prevent blood loss. Sometimes this increased clotting can happen in other areas of the body, like the legs or lungs. This can block your blood flow and be very dangerous.     Call your provider if you:  Have a red, swollen spot on the back of your leg that is warm or painful when you touch it.   Are coughing up blood.     Infection    Call your provider if you have any of these symptoms:  Fever of 100.4 F (38 C) or higher.  Pain or redness around your stitches if you had an incision.   Any yellow, white, or green fluid coming from places where you had stitches or surgery.    Mood Problems (postpartum depression)    Many people feel sad or have mood changes after having a baby. But for some people, these mood swings are worse.     Call your provider right away if you feel so anxious or nervous that you can't care for yourself or your baby.    Preeclampsia (high blood pressure)    Even if you  didn't have high blood pressure when you were pregnant, you are at risk for the high blood pressure disease called preeclampsia. This risk can last up to 12 weeks after giving birth.     Call your provider if you have:   Pain on your right side under your rib cage  Sudden swelling in the hands and face    Remember: You know your body. If something doesn't feel right, get medical help.       Checking Your Blood Pressure at Home  During and after pregnancy  How do I measure my blood pressure?  It s important to take the readings at the same time each day, such as morning and evening. Take your blood pressure before taking any morning medications.  How to get the most accurate reading  30 minutes before checking your blood pressure, avoid the following:  Drinking caffeine  Drinking alcohol  Eating  Smoking  Exercising  5 minutes before checking your blood pressure:  Use the bathroom and urinate so you have an empty bladder.  Sit still in a chair for around 5 minutes. Stay calm and relaxed and do not talk if possible.  To check your blood pressure:     Sit up straight in a chair.  Place your feet on the floor. Don t cross your ankles or legs.  Rest your arm at the level of your heart on a table or desk or on the arm of a chair. Use the same arm every day.  Pull up your shirt sleeve. Don t take the measurement over clothes.  Wrap the blood pressure cuff around the upper part of your left arm, 1 inch (2.5 cm) above your elbow.  Fit the cuff snugly around your arm. You should be able to place only one finger between the cuff and your arm.  Position the cord so that it rests in the bend of your elbow.  Press the power button.  Sit quietly while the cuff inflates and deflates.  Read the digital reading on the monitor screen and write the numbers down (record them) in a notebook.  Wait 2-3 minutes, then repeat the steps, starting at step 1.  Which features do you need?  Arm cuff monitors give the most exact readings.  Wrist  "and finger blood pressure monitors are often less exact.  Pick a blood pressure monitor that has passed tests to show they measure exactly. Blood pressure cuffs for sale in the [place] that have passed tests are listed on the website www.validatebp.org.  Some monitors that have passed tests are:  Omron 3 Series Upper Arm Blood Pressure Monitor (Model UI9433)  Omron 5 Series Upper Arm Blood Pressure Monitor (Model HA0936)  Omron 7 Series Upper Arm Blood Pressure Monitor (Model HEM-7320)  [employer] Upper Arm Blood Pressure Monitor with Talking Function (UA 1030T)  Don t use smartphone apps. There are many smartphone apps that claim to check your blood pressure using the pulse in your wrist or finger. These don t work. They haven t passed any tests. Don t give your clinic a blood pressure reading from a smartphone santhosh.  If you have a flexible spending account (FSA) or health savings account (HSA), you may wish to pay yourself back (reimburse) for the machine and cuff. A blood pressure monitor is an allowed over-the- counter (OTC) item to pay yourself back from these accounts.  Cuff size  The size of the arm cuff is a key feature. Make sure the cuff is the right size for your arm. If the cuff isn t the right size, readings will either be too high or low.  To know what size cuff to buy, measure the distance around your bicep (upper arm).  Use a flexible measuring tape or . Place the measuring tape shelter between your armpit and elbow. Measure the distance around your arm in inches.  You may need to buy a cuff apart from the machine to get the right size.  Cuff sizes and arm measurements  Small adult: 22 to 26 cm (8.7 to 10.2 inches)  Adult: 27 to 34 cm (10.6 to 13.4 inches)  Large adult: 35 to 44 cm (13.8 to 17.3 inches)  Adult thigh: 45 to 52 cm (17.7 to 20.5 inches)    Copyright statement\" content=\"For informational purposes only. Not to replace the advice of your health care provider. Photo: ID [id number]   " Jennifer  [employer]. Text copyright   2023 [facility]. All rights reserved. Clinically reviewed by [facility]. [employer] [id number] - REV 03/23.    For informational purposes only. Not to replace the advice of your health care provider. Copyright 2020 [employer] [facility]. All rights reserved. Clinically reviewed by Daily Lala RNC-OB, MSN. [employer] [id number] - Rev 02/23.    Please reference [facility]-BP handouts.

## 2024-04-20 NOTE — PLAN OF CARE
Postpartum assessments WDL. VSS. Blood pressures have been WNL. Pt post Magnesium Sulfate. Denies headache, vision changes, SOB, RUQ pain. Reflexes normal, no clonus. Pt is Breastfeeding/Pumping with no  assistance, and is attentive to infant cues. Reports good pain management with PO medications.   0300 Pt denies any pain. Support person present. Plan of care ongoing, no concerns as of present.

## 2024-04-21 LAB
ATRIAL RATE - MUSE: 69 BPM
DIASTOLIC BLOOD PRESSURE - MUSE: 80 MMHG
INTERPRETATION ECG - MUSE: NORMAL
P AXIS - MUSE: 47 DEGREES
PR INTERVAL - MUSE: 130 MS
QRS DURATION - MUSE: 76 MS
QT - MUSE: 380 MS
QTC - MUSE: 407 MS
R AXIS - MUSE: 104 DEGREES
SYSTOLIC BLOOD PRESSURE - MUSE: 132 MMHG
T AXIS - MUSE: 53 DEGREES
VENTRICULAR RATE- MUSE: 69 BPM

## 2024-04-21 NOTE — ED PROVIDER NOTES
EMERGENCY DEPARTMENT NOTE     Name: Indigo Bright    Age/Sex: 24 year old female   MRN: [id number]   Evaluation Date & Time:  4/20/2024  8:47 PM    PCP:    No Ref-Primary, Physician   ED Provider: Brendan Talavera D.O.       CHIEF COMPLAINT    Tingling and Dizziness       DIAGNOSIS & DISPOSITION/MEDICAL DECISION MAKING     1. History of pre-eclampsia        Indigo Bright is a 24 year old female with relevant past history of preeclampsia who presents to the emergency department for evaluation of light headedness and tingling.    EKG interpretation: Sinus rhythm     Differential diagnosis considered included but not limited to preeclampsia,PIH    Medical Decision Making  Patient was noted to be mildly hypertensive otherwise stable vital signs.  HEENT: No papilledema  Cardiac: Regular rate and rhythm S1-S2 without murmur rub  Pulmonary: Lungs are clear to ascultation bilaterally with good breath sounds  Abdomen: Soft nontender, positive bowel sounds.  No organomegaly or mass  Lab: CBC/platelets, comprehensive metabolic profile within normal limits  Patient eyes blood pressure noted was 159 systolic.  Discussed case with Dr. Gregory and for OB/GYN will add labetalol 3 times daily to nifedipine XR for blood pressure management.  Patient will follow-up with [facility] early next week..  She will continue to monitor blood pressure at home.  If recurrent elevation of blood pressure greater than 150 systolic sustained or has systemic symptoms including headache, visual changes, abdominal pain will return to the emergency department.    Interventions:oral labetalol    Discharge Vital Signs:BP (!) 145/78   Pulse 71   Temp 98.4  F (36.9  C) (Oral)   Resp 23   LMP 06/12/2023 (Exact Date)   SpO2 98%      DISPOSITION: Home    Diagnostic studies:  Imaging:  No orders to display      Lab:  Labs Ordered and Resulted from Time of ED Arrival to Time of ED Departure   HEPATIC FUNCTION PANEL - Abnormal        "Result Value    Protein Total 6.8      Albumin 3.8      Bilirubin Total 0.2      Alkaline Phosphatase 202 (*)     AST 17      ALT 15      Bilirubin Direct <0.20     CBC WITH PLATELETS AND DIFFERENTIAL - Abnormal    WBC Count 12.0 (*)     RBC Count 3.48 (*)     Hemoglobin 10.3 (*)     Hematocrit 30.2 (*)     MCV 87      MCH 29.6      MCHC 34.1      RDW 13.7      Platelet Count 347      % Neutrophils 73      % Lymphocytes 18      % Monocytes 6      % Eosinophils 1      % Basophils 0      % Immature Granulocytes 1      NRBCs per 100 WBC 0      Absolute Neutrophils 8.8 (*)     Absolute Lymphocytes 2.2      Absolute Monocytes 0.8      Absolute Eosinophils 0.2      Absolute Basophils 0.1      Absolute Immature Granulocytes 0.1      Absolute NRBCs 0.0     BASIC METABOLIC PANEL - Normal    Sodium 140      Potassium 4.1      Chloride 105      Carbon Dioxide (CO2) 22      Anion Gap 13      Urea Nitrogen 10.4      Creatinine 0.63      GFR Estimate >90      Calcium 9.4      Glucose 87                 Triage note reviewed:Pt c/o tingling and lightheadedness x2 days. ~10 days postpartum, was discharged 2 days ago from an inpatient stay for preeclampsia. Discharged with nifedipine daily. Pt states her BP's were stable until she started feeling symptomatic again. Pt states she feels \"skin crawling\" and like \"rushes of adrenaline.\"     Triage Assessment (Adult)       Row Name 04/20/24 2056          Triage Assessment    Airway WDL WDL        Respiratory WDL    Respiratory WDL WDL        Skin Circulation/Temperature WDL    Skin Circulation/Temperature WDL WDL        Cardiac WDL    Cardiac WDL WDL        Peripheral/Neurovascular WDL    Peripheral Neurovascular WDL WDL        Cognitive/Neuro/Behavioral WDL    Cognitive/Neuro/Behavioral WDL WDL                         History:  Supplemental history from: Family Member/Significant Other  External Record(s) reviewed: Discharge summary April 18, 2024    Work Up:  Chart documentation " includes differential considered and any EKGs or imaging independently interpreted by provider, where specified.  In additional to work up documented, I considered the following work up: N/A    External consultation:  Discussion of management with another provider: OB-Gyn    Complicating factors:  Care impacted by chronic illness: N/A  Care affected by social determinants of health: N/A    Disposition considerations: Discharge. I prescribed additional prescription strength medication(s) as charted. N/A.    At the conclusion of the encounter I discussed the results of all of the tests and the disposition. The questions were answered. The patient or family acknowledged understanding and was agreeable with the care plan.    TOTAL CRITICAL CARE TIME (EXCLUDING PROCEDURES): Not applicable    PROCEDURES:   None    EMERGENCY DEPARTMENT COURSE   9:20 PM I met with the patient to gather history and to perform my initial exam.  We discussed treatment options and the plan for care while in the Emergency Department.    ED INTERVENTIONS     Medications   ondansetron (ZOFRAN) injection 4 mg (4 mg Intravenous $Given 4/20/24 8179)   labetalol (NORMODYNE) tablet 200 mg (200 mg Oral $Given 4/20/24 7471)       DISCHARGE MEDICATIONS        Review of your medicines        UNREVIEWED medicines. Ask your doctor about these medicines        Dose / Directions   loratadine 10 MG tablet  Commonly known as: CLARITIN      Refills: 0     NIFEdipine ER 60 MG 24 hr tablet  Commonly known as: ADALAT CC  Used for: Severe pre-eclampsia, antepartum      Dose: 60 mg  Take 1 tablet (60 mg) by mouth every 24 hours  Quantity: 30 tablet  Refills: 0     PRENATAL PO      Refills: 0            START taking        Dose / Directions   labetalol 200 MG tablet  Commonly known as: NORMODYNE      Dose: 200 mg  Take 1 tablet (200 mg) by mouth 3 times daily for 10 days  Quantity: 30 tablet  Refills: 0     ondansetron 4 MG ODT tab  Commonly known as: ZOFRAN ODT       "Dose: 4 mg  Take 1 tablet (4 mg) by mouth every 8 hours as needed for nausea  Quantity: 12 tablet  Refills: 0               Where to get your medicines        Some of these will need a paper prescription and others can be bought over the counter. Ask your nurse if you have questions.    Bring a paper prescription for each of these medications  labetalol 200 MG tablet  ondansetron 4 MG ODT tab           INFORMATION SOURCE AND LIMITATIONS    History/Exam limitations: N/A  Patient information was obtained from: patient and   Use of : N/A    HISTORY OF PRESENT ILLNESS   Indigo Bright is a 24 year old year old female with a relevant past history of Preeclampsia and obesity, who presents to this ED by private car for evaluation of light headedness and tingling.    Per chart review, patient gave birth on 4/11 [facility]. She had a hospital admission from 4/18-4/20 at [facility]. Patient was initiated on IV Magnesium on HD#1, which was discontinued after 18hrs due to patient discomfort. Blood pressure was monitored and was normalized with nifedipine 60mg. On HD#3 patient was meeting goals for discharge. Lochia was minimal, pain was well controlled and BP was well controlled.     Patient reports onset of light headedness and tingling upon returning home today from the hospital. Patient states she was discharged this afternoon [facility] after being found to have Preeclampsia while at the hospital for her daughter's hospital stay. Patient states she checked her blood pressure at home prior to arrival in the Emergency Department today and it was \"all over the place.\" Patient also reports feeling \"jittery.\" Of note, this is the patient's first pregnancy and birth. She is 9 days postpartum. Patient was discharged home with nifedipine and instructed to take it daily for Preeclampsia.    REVIEW OF SYSTEMS:   All other systems reviewed and are " negative except as noted above in HPI.    PATIENT HISTORY     Past Medical History:   Diagnosis Date     Hypertension 08/15/21     Patient Active Problem List   Diagnosis     Morbid obesity (H)     Sleep disorder     Allergic rhinitis, unspecified seasonality, unspecified trigger     Primary narcolepsy without cataplexy     Supervision of high risk pregnancy in third trimester     Acute left-sided low back pain without sciatica     GBS bacteriuria     Encounter for triage in pregnant patient     Pregnancy     Uterine contractions     Vacuum-assisted vaginal delivery     Elevated blood pressure reading without diagnosis of hypertension     Dizziness     Hypertension in pregnancy, preeclampsia, severe, postpartum condition     Vision changes     Headache     Preeclampsia, severe     Past Surgical History:   Procedure Laterality Date     WISDOM TOOTH EXTRACTION         Allergies   Allergen Reactions     Seasonal Allergies Headache       OUTPATIENT MEDICATIONS     Discharge Medication List as of 4/20/2024 11:13 PM        START taking these medications    Details   labetalol (NORMODYNE) 200 MG tablet Take 1 tablet (200 mg) by mouth 3 times daily for 10 days, Disp-30 tablet, R-0, Local Print      ondansetron (ZOFRAN ODT) 4 MG ODT tab Take 1 tablet (4 mg) by mouth every 8 hours as needed for nausea, Disp-12 tablet, R-0, Local Print            Vitals:    04/20/24 2115 04/20/24 2126 04/20/24 2200 04/20/24 2230   BP: (!) 141/86 (!) 147/92 132/80 (!) 145/78   Pulse: 83 91 73 71   Resp: 16  16 23   Temp:       TempSrc:       SpO2: 98% 98% 97% 98%       Physical Exam   Constitutional: Oriented to person, place, and time. Appears well-developed and well-nourished.   Cardiovascular: Normal rate, regular rhythm and normal heart sounds.    Pulmonary/Chest: Normal effort  and breath sounds normal.   Abdominal: Soft. Bowel sounds are normal.   Neurological: Alert and oriented to person, place, and time. Normal strength. No sensory  deficit. No cranial nerve deficit.  Psychiatric: Normal mood and affect. Behavior is normal. Thought content normal.     DIAGNOSTICS    LABORATORY FINDINGS (REVIEWED AND INTERPRETED):  Labs Ordered and Resulted from Time of ED Arrival to Time of ED Departure   HEPATIC FUNCTION PANEL - Abnormal       Result Value    Protein Total 6.8      Albumin 3.8      Bilirubin Total 0.2      Alkaline Phosphatase 202 (*)     AST 17      ALT 15      Bilirubin Direct <0.20     CBC WITH PLATELETS AND DIFFERENTIAL - Abnormal    WBC Count 12.0 (*)     RBC Count 3.48 (*)     Hemoglobin 10.3 (*)     Hematocrit 30.2 (*)     MCV 87      MCH 29.6      MCHC 34.1      RDW 13.7      Platelet Count 347      % Neutrophils 73      % Lymphocytes 18      % Monocytes 6      % Eosinophils 1      % Basophils 0      % Immature Granulocytes 1      NRBCs per 100 WBC 0      Absolute Neutrophils 8.8 (*)     Absolute Lymphocytes 2.2      Absolute Monocytes 0.8      Absolute Eosinophils 0.2      Absolute Basophils 0.1      Absolute Immature Granulocytes 0.1      Absolute NRBCs 0.0     BASIC METABOLIC PANEL - Normal    Sodium 140      Potassium 4.1      Chloride 105      Carbon Dioxide (CO2) 22      Anion Gap 13      Urea Nitrogen 10.4      Creatinine 0.63      GFR Estimate >90      Calcium 9.4      Glucose 87           IMAGING (REVIEWED AND INTERPRETED):  No orders to display     ECG:   Performed at: 22:13  HR:  69 bpm  Rhythm: Sinus  Axis: 104  QRS duration: 76  QTC: 407  ST changes: No ST segment elevation or depression, no T wave inversion, No Q wave  Interpretation: Normal sinus rhythm  Compared to most recent ECG from: 4/18/24 -Sinus rythm replaces sinus tachycardia, ventricular rate decreased by 44 BPM.    I have reviewed the patient's ECG, with comments made as listed above. Please see scanned image for full interpretation.     I, Anjana Griffiths, am serving as a scribe to document services personally performed by Brendan Talavera D.O., based on my  observation and the provider s statements to me.    I, Brendan Talavera D.O., attest that Anjana Griffiths is acting in a scribe capacity, has observed my performance of the services and has documented them in accordance with my direction.    Brendan Talavera D.O.  EMERGENCY MEDICINE   04/20/24  [facility] EMERGENCY ROOM  [address] [address]  [phone]  Dept: [phone]       Brendan Talavera,   04/21/24 0359

## 2024-04-21 NOTE — PROVIDER NOTIFICATION
04/20/24 2002   Provider Notification   Provider Name/Title Dr. Brink   Method of Notification Electronic Page   Request Evaluate - Remote   Notification Reason Other (Comment)     Pt called not feeling well, doesn't remember last BP. Dr. Brink paged with pt phone number to call back for [place] [facility].    Patient call back delayed by acute patient care needs. Upon opening of chart, patient is being evaluated in the Emergency department.    Maylin Brink MD MPH

## 2024-04-21 NOTE — DISCHARGE INSTRUCTIONS
Continue nifedipine as previously prescribed and start labetalol 3 times daily.  Continue Zofran as needed for nausea.  Follow-up with [facility] early next week for reassessment.  Continue to monitor your blood pressure at home and if you have consistently elevated blood pressures greater than 150 systolic (top number) despite increases in your blood pressure medication or you develop other symptoms including headache, visual changes, vomiting, abdominal pain, leg swelling return to the emergency department.

## 2024-04-21 NOTE — ED TRIAGE NOTES
"Pt c/o tingling and lightheadedness x2 days. ~10 days postpartum, was discharged 2 days ago from an inpatient stay for preeclampsia. Discharged with nifedipine daily. Pt states her BP's were stable until she started feeling symptomatic again. Pt states she feels \"skin crawling\" and like \"rushes of adrenaline.\"     Triage Assessment (Adult)       Row Name 04/20/24 4472          Triage Assessment    Airway WDL WDL        Respiratory WDL    Respiratory WDL WDL        Skin Circulation/Temperature WDL    Skin Circulation/Temperature WDL WDL        Cardiac WDL    Cardiac WDL WDL        Peripheral/Neurovascular WDL    Peripheral Neurovascular WDL WDL        Cognitive/Neuro/Behavioral WDL    Cognitive/Neuro/Behavioral WDL WDL                     "

## 2024-04-22 ENCOUNTER — TELEPHONE (OUTPATIENT)
Dept: OBGYN | Facility: CLINIC | Age: 24
End: 2024-04-22
Payer: COMMERCIAL

## 2024-04-22 ENCOUNTER — TELEPHONE (OUTPATIENT)
Dept: MATERNAL FETAL MEDICINE | Facility: CLINIC | Age: 24
End: 2024-04-22
Payer: COMMERCIAL

## 2024-04-22 ENCOUNTER — PATIENT OUTREACH (OUTPATIENT)
Dept: CARE COORDINATION | Facility: CLINIC | Age: 24
End: 2024-04-22
Payer: COMMERCIAL

## 2024-04-22 NOTE — PROGRESS NOTES
"Clinic Care Coordination Contact  [facility]: Post-Discharge Note  SITUATION                                                      Admission:    Admission Date: 04/20/24   Reason for Admission: Tingling  Dizziness  Discharge:   Discharge Date: 04/20/24  Discharge Diagnosis: Tingling  Dizziness    BACKGROUND                                                      Per hospital discharge summary and inpatient provider notes:  Indigo Bright is a 24 year old female with relevant past history of preeclampsia who presents to the emergency department for evaluation of light headedness and tingling.     ASSESSMENT           Discharge Assessment  How are you doing now that you are home?: \" Feel abit better \"  How are your symptoms? (Red Flag symptoms escalate to triage hotline per guidelines): Unchanged;Improved  Do you feel your condition is stable enough to be safe at home until your provider visit?: Yes  Does the patient have their discharge instructions? : Yes  Does the patient have questions regarding their discharge instructions? : No  Were you started on any new medications or were there changes to any of your previous medications? : Yes  Does the patient have all of their medications?: Yes  Do you have questions regarding any of your medications? : No  Do you have all of your needed medical supplies or equipment (DME)?  (i.e. oxygen tank, CPAP, cane, etc.): Yes  Discharge follow-up appointment scheduled within 14 calendar days? : Yes  Discharge Follow Up Appointment Date: 04/24/24  Discharge Follow Up Appointment Scheduled with?: Specialty Care Provider    Post-op ([facility] Only)  If the patient had a surgery or procedure, do they have any questions for a nurse?: No             PLAN                                                      Outpatient Plan:  Follow up with Kandice Kenny DO in 2 days    Future Appointments   Date Time Provider Department Center   4/24/2024  1:00 PM Gina Granados, " [facility]         For any urgent concerns, please contact our 24 hour nurse triage line: [phone] ([phone])         Tavia Mcmillan MA

## 2024-04-22 NOTE — TELEPHONE ENCOUNTER
Pt called BP with complaints of jaw pain.     Took a nap and woke up with new jaw pain this afternoon. Has not tried medication.     BP today. Is followed by [place] [facility].   134/86  136/84    Denies chest pain, but is having some chest discomfort likely due to full breasts. No shortness of breath. No headache or vision changes. No RUQ or epigastric pain. No nausea. Pt read online that jaw pain can be a sign of MI. Discussed low suspicion for MI and most likely muscular pain. Advised to take ibuprofen, try warm pack and/or bath, and stretching jaw/neck area. If any new symptoms arise seek care or call back. Pt agreeable.     ATUL Hernández CNM

## 2024-04-22 NOTE — TELEPHONE ENCOUNTER
Phone call to Indigo introducing [facility] and following up on ED visits. Pt feeling well this morning. Reviewed medications and phone numbers for [facility].     Tiana Ricks RN

## 2024-04-23 PROBLEM — Z36.89 ENCOUNTER FOR TRIAGE IN PREGNANT PATIENT: Status: RESOLVED | Noted: 2024-04-11 | Resolved: 2024-04-23

## 2024-04-23 PROBLEM — Z34.90 PREGNANCY: Status: RESOLVED | Noted: 2024-04-11 | Resolved: 2024-04-23

## 2024-04-23 NOTE — PROGRESS NOTES
"SUBJECTIVE:   Indigo Bright is here for a 2-week postpartum checkup following VAVD.    Indigo shares she has had a taxing postpartum period thus far. She is feeling fairly positive about her labor and birth experience however shortly after discharge, concerns for herself and her baby developed. Baby dari Zeng was admitted to NICU at [facility] following discharge after birth at [facility]. The baby was thought to be lethargic and had positive blood cultures and underwent procedures that were ultimately not necessary after it was determined that the blood cultures were contaminated specimens. This was very anxiety producing for Indigo and she began to experience what she feels is increased anxiety and panic attacks. With her sxs she phoned into the CNM service and eventually was readmitted to the hospital at 8 days postpartum due to severe range blood pressures and dx of preeclampsia. She was discharged on 4/20/24 from [facility]. Per discharge note: \"Patient was initiated on IV Magnesium on HD#1, which was discontinued after 18hrs due to patient discomfort. Blood pressure was monitored and was normalized with nifedipine 60mg. On HD#3 patient was meeting goals for discharge. Lochia was minimal, pain was well controlled and BP was well controlled. Patient would like to follow up with [facility] clinic.\"   She was then started on Labetalol TID in addition to the Nifedipine after what she describes as a panic attack and subsequent ER visit on the evening of 4/20/24 with severe range BP. While on Labetalol, she developed unpleasant SE of severe drowsiness and jaw and joint pain so she stopped taking this. Her last dose was on the evening of 4/22.  She is also followed by [facility]. She states this will be a 6-week program.BP readings on 4/22/24 per chart review through [facility] show: 134/86, 136/84. She states her readings are in this range at home but she was anxious it would be high in " "clinic today due to anxiety. She is thankful her her BP today is WN despite the anxiety.  Currently she is only taking Nifedipine XR once daily. She denies sxs of preeclampsia at this time.      Since delivery, she has been breast feeding and pumping - she voices challenges with this due to all of the hospital visits and stress over her and her baby's well-being. Interested in  support.  Baby Tere is home now.  Her bleeding is appropriate for the duration.  Bowel and bladder function is WNL.   Resumed intercourse: no  Denies pain.  We discussed contraceptions and she has chosen condoms/NFP.      OBJECTIVE:  /76 (BP Location: Right arm, Patient Position: Sitting, Cuff Size: Adult Large)   Pulse 88   Ht 1.6 m (5' 3\")   Wt 99.6 kg (219 lb 8 oz)   LMP 06/12/2023 (Exact Date)   Breastfeeding Yes   BMI 38.88 kg/m        EPDS: 14/\"never\" #10. Shares elevated score is due to situational stress.    EXAM:  HEENT: grossly normal.  LUNGS: Breathing unlabored.  HEART: well-perfused.  PELVIC: deferred.  EXTREMITIES: Moves all extremities equally.  NEUROLOGIC: grossly normal.  EXTREMITIES: Warm to touch, good pulses, no ankle edema or calf tenderness.  NEUROLOGIC: grossly normal.      ASSESSMENT:   2-week postpartum exam after vaginal delivery with vacuum assist.  S/p readmission to [facility] for severe postpartum preeclampsia, followed by [facility].  Normotensive on PO Nifedipine ER 60 mg daily  Elevated EPDS, consistent with situational stress/medical concerns  Lactating mother  Body mass index is 38.88 kg/m ., Weight loss of 30 lb since birth.      PLAN:  -Advised continuation of Nifedipine at this time and continued involvement/reporting of BP and symptoms to the [facility] as well as our CNM team if concerns arise. She is feeling strongly as well about continuing the medication at this time. This provider will also look into specifics about the program as it is not part of our practice yet at " [facility].  -Therapeutic listening provided and offered therapy referral if needed and she states she has a connection through her  if needed. She does feel well supported at home by family  -Discussed I would send her the Patient Relations phone number through [facility] so that she can voice her concerns over her 's care while in the NICU.  -Encouraged self cares at home with tub soaks, nutritious foods, hydration, and rest as best she can now that she and baby are home.  -Advised follow up in 1 week in clinic for BP check (could combine with lactation consult as she has been interested in this as well). Determine need for continued medication at that time - likely consult with OBGYN.  -Also encouraged scheduling 6 week PP follow up.      20 minutes spent on BP discussion and plan making in addition to routine 2-week postpartum check up on the date of the encounter doing chart review, review of outside records, review of test results, interpretation of tests, patient visit, and documentation  Gina Granados CNM

## 2024-04-24 ENCOUNTER — PRENATAL OFFICE VISIT (OUTPATIENT)
Dept: MIDWIFE SERVICES | Facility: CLINIC | Age: 24
End: 2024-04-24
Payer: COMMERCIAL

## 2024-04-24 VITALS
WEIGHT: 219.5 LBS | HEIGHT: 63 IN | BODY MASS INDEX: 38.89 KG/M2 | SYSTOLIC BLOOD PRESSURE: 138 MMHG | DIASTOLIC BLOOD PRESSURE: 76 MMHG | HEART RATE: 88 BPM

## 2024-04-24 DIAGNOSIS — Z37.9 VACUUM-ASSISTED VAGINAL DELIVERY: ICD-10-CM

## 2024-04-24 PROCEDURE — 99213 OFFICE O/P EST LOW 20 MIN: CPT | Performed by: ADVANCED PRACTICE MIDWIFE

## 2024-04-24 ASSESSMENT — EDINBURGH POSTNATAL DEPRESSION SCALE (EPDS)
THE THOUGHT OF HARMING MYSELF HAS OCCURRED TO ME: NEVER
THINGS HAVE BEEN GETTING ON TOP OF ME: YES, SOMETIMES I HAVEN'T BEEN COPING AS WELL AS USUAL
I HAVE FELT SAD OR MISERABLE: YES, QUITE OFTEN
TOTAL SCORE: 14
I HAVE BLAMED MYSELF UNNECESSARILY WHEN THINGS WENT WRONG: YES, SOME OF THE TIME
I HAVE BEEN SO UNHAPPY THAT I HAVE HAD DIFFICULTY SLEEPING: NOT VERY OFTEN
I HAVE BEEN ANXIOUS OR WORRIED FOR NO GOOD REASON: YES, SOMETIMES
I HAVE BEEN SO UNHAPPY THAT I HAVE BEEN CRYING: ONLY OCCASIONALLY
I HAVE BEEN ABLE TO LAUGH AND SEE THE FUNNY SIDE OF THINGS: NOT QUITE SO MUCH NOW
I HAVE FELT SCARED OR PANICKY FOR NO GOOD REASON: YES, SOMETIMES
I HAVE LOOKED FORWARD WITH ENJOYMENT TO THINGS: RATHER LESS THAN I USED TO

## 2024-04-25 PROBLEM — O47.9 UTERINE CONTRACTIONS: Status: RESOLVED | Noted: 2024-04-11 | Resolved: 2024-04-25

## 2024-04-25 PROBLEM — R82.71 GBS BACTERIURIA: Status: RESOLVED | Noted: 2024-02-15 | Resolved: 2024-04-25

## 2024-04-25 NOTE — PATIENT INSTRUCTIONS
"\"We hope you had a positive experience and that you can definitely recommend [facility] to your family and friends. You ll be receiving a survey soon, and we look forward to hearing your feedback\".    POSTPARTUM INFORMATION AND RESOURCES:     Congratulations on the birth of your baby. We have gathered together some information on staying healthy in the postpartum time including information on exercise, nutrition and mental health. We have also listed some local and national resources for lactation support and mental health support.     If you have questions or concerns and need to speak with a midwife immediately, please call the on-call midwife at [phone].     If you have a non-emergent question or would like to schedule a follow up appointment, please call the clinic midwife at [phone].     Thank you for sharing your birth experience with the [facility].  Congratulations on the birth of your baby!     ---------------------------------------------------------------------------------------------------------  EXERCISE & NUTRITION:      Getting and Staying Active: A Healthy You!   -Why should I exercise? Exercise, being physically active, is very important for all women. Being active can help you:   Lose or maintain weight   Have more energy   Sleep better   Enjoy sex more   Relieve stress and think better   Lower the chance you will have heart disease, high blood pressure, and diabetes   Strengthen your bones and muscles   Have fewer hot flashes if you are older   -How active do I have to be to get the bene?ts of exercise?  Studies show that as little as 15 minutes of moderate exercise--like fast walking or dancing--3 times a week can improve the health of your heart. Ifyou want to get the best benefits from exercise, try to increase your physical activity to at least 30 minutes, 5 times a week. If you have a serious health problem,be sure to talk " with your health care provider before starting an exercise program.   Https://Zykis/  Http://www.Hang w/.com/     [email or web address]  [email or web address]  [email or web address]     Taking Care of your Health: Health Care Maintenance Screening recommendations   In all the things women do, taking care of everything and everybody else, they sometimes forget to take care of themselves. This handout reviews the health screenings and vaccines that are recommended for women of all ages. Talk with yourhealth care provider about which tests and vaccines you need. The chart below lists the screening tests and vaccinations recommended for healthy women who do not have a high risk for most diseases.   The recommendations in thischart are guidelines only. For some tests and vaccines, the chart says you should talk to your health care provider.This is because the best recommendations for you depend on your personal health history. Your health care provider may suggest more frequent testing or additional tests ifyou havea higher chance of getting some diseases      Planning Your Family: Developing a Reproductive Life Plan   Planning ahead can help you avoid getting pregnant when you don t want to be pregnant and also be in good health if and when you do decide to become pregnant. Many women have at least one pregnancy in their lives, even if it was not planned. In fact, about half of all pregnancies are not planned. Getting pregnant when you did not plan it can be a problem, or it can turn into a happy event. Planning pregnancy leads to healthier pregnancies, healthier mothers, and healthier families.   Although many women want to have a family, not everyone wants to have children. More and more women are childless by choice (also known as childfree). Whether to have children is a personal choice that only you can make. It s okay not to want children! If you never want to get pregnant, it is important to make sure you  "always use very effective birth control, such as an intrauterine device, the birth control implant, female sterilization (having your tubes tied), or your partner having a vasectomy.      Reliable resources:   ?   American College of Nurse-Midwives (ACNM) http://www.midwife.org/; look at the informational handouts at http://www.midwife.org/Share-With-Women   ??   Women's Health.gov:   http://www.womenshealth.gov/a-z-topics/index.html   FDA - Nutrition ?www.mypyramid.gov? Under \"For Consumers,\" click on \"pregnant and breastfeeding women.\"   ?   Vaccines : Centers for Disease Control and Prevention (CDC) http://www.cdc.gov/vaccines/   ?   [facility] www.Second Light.Yoovi   ?   When researching information on the web, question the validity of websites.? The domains .gov, [employer] and.org tend to be more reliable information.? If there are a lot of advertisements, be cautious of the information provided. Stay away from blogs and chat rooms please!   ?   ?   Nutrition and supplements:   Daily multivitamin vitamin (with 400 - 1000 mcg folic acid).? Take with food as needed.   ?   4-5 servings of dairy or other calcium rich foods (fish, leafy greens, soy)?per day - if not, take 500-1000 mg additional calcium (Tums, pills, chews). Take with dairy.   ?   Vitamin D3 4000 IU geltab daily. Vitamin D rich foods: Cod Liver Oil (1Tbsp), [place], Mackerel, Tuna, fortified milk and yogurt, Beef and liver, sardines, egg, fortified cereals, swiss cheese.? Take supplements with fattiest meal.?   ?   2-3 (4) oz servings of fish, seafood, nuts (walnuts & almonds), oils, avocado per week - if not, take Omega 3 Fatty acids: DHA & IRIS 0465-4542 mg per day. ?Other names: cod liver oil, fish oil. Take with fattiest meal.   ?   ?---------------------------------------------------------------------------------------------------------  POSTPARTUM & LACTATION RESOURCES :         Early Childhood Family Education [place] [address] provides a free, drop-in " "class/breastfeeding support group, facilitated by a lactation consultant and .  During the group you can connect with other parents, weigh your baby, ask questions about feeding and baby development, and more.  You do not need to register.    in-person meetings will begin on , are for parents of babies from birth to 9 months, and will meet on Monday evenings from 6 - 7:15 pm in [facility], which is at [address] [facility].  [place] [id number] also offers virtual group meetings with a lactation consultant/.  These take place on , from 11:30 am - 12:30 pm for parents of newborns to 3-month-olds, and from 4:15 - 5:15 for parents of 3 - 9 - month olds.  To get the meeting link contact Maria G Flores at [phone].    [facility] offers a free, drop-in breastfeeding support group facilitated by DANTE Randolph.   at [place] [facility] [address], [place].  A scale is available to check baby weights, if desired.  [place] also has a variety of new parent classes:  (cost for registration)  https://Agensys/classes/    [facility] Lactation [facility] facilitated by DANTE Stokes:  Free, drop-in support group for breastfeeding, with baby scale available if needed.  Meets at [facility], second Tuesday of each month, 10 am - 12 pm.  Text [phone] for info.    Lat Cafe Support Group,  at 10:30 am   Run by DANTE Ray of [facility]   Go to [facility] Facebook page, click on \"events\" for link:   Https://www.facebook.com/events/895793292911468/    [facility] [place] breastfeeding support community:  free, drop-in breastfeeding support facilitated by Certified Lactation Counselors, open  and  from 1 pm - 5 pm.  In [place]:  [facility] [address] [facility]:   guidingarOhioHealth Van Wert Hospitalbecka.[facility]    Health " [facility] Milk Hour, Thursdays at 2:30 pm    Run by DANTE Wills  Go to [facility] FB page and send message to get link   Https://www.[employer]/healthViewabillundations/    Pal Gonzalez:  http://www.Addictiveas.org/    [facility] offers a Lactation Lounge every Friday 12pm - 1pm, run by Pal Ferreira Leader.  Sign up via link at UserMojo/cbe-lactation   https://www.UserMojo/cbe-lactation    [facility] is offering virtual support groups every Monday, 10:30 am - 12 pm, run by RN IBCLC: Https://www.[employer].com/events/187908030836873/    Culturally-Specific Breastfeeding Support:     For Hmong Families:   The Hmong Breastfeeding Coalition is a wonderful support for [place] Hmong women who are breastfeeding.  They are best found on Facebook.    for Black families:    Chocolate Milk Club:  http://www.[employer]/chocolate-milk-club/  Email: Enrique@[employer]    R.O.S.E. = Reaching our Sisters Everywhere  Http://www.breastfeedingrose.org/    Club Mom breastfeeding support for Black mothers:  Contact Aime Gonzalez  Phone: [phone]   Email:  [email or web address].     Teresa Gamboa  Phone: [phone]   Email:  [email or web address].    Club Dad parent support for Black fathers:   Contact Braeden Osborne   Phone: [phone]   Email:  [email or web address].    For Native/Indigenous Families:    https://www.[employer].com/groups/issac.gimashkikinaan        OUTPATIENT LACTATION RESOURCES   -Schedule an appointment with a [facility] midwife who is also a Lactation Consultant by calling [phone].  Visits on Tuesdays, Wednesdays and Thursdays at multiple locations.  Call: [phone].       - Information on medication use while breastfeeding: http://toxnet.nlm.nih.gov/newtoxnet/lactmed.htm      Other Online Breastfeeding Resources:   BreastfeedingmadesE-Drive AutosleSidestagecom  "  Tereseli.org (La Leche League)   Normalfed.com   Womenshealth.gov/breastfeeding   Workandpump.com   PageFair.[employer]Mobius  https://Modern Message/abcs/   Click on \"Learn More About Attachment\"     -New Parent Connection Drop-In:  In collaboration with Serge, Early Childhood Family Education (ECFE), a program of [address], offers ongoing classes for new parents in their infants.  The connection classes offer support and resources to parents during the exciting and challenging period of transition following the birth of her baby.  Parents and babies (birth to 6 months of age) may join the group at any time.  Baby's birth to 3 months meet , from 1230 to 1:30 PM  Babies 3-6 months meet , from 4 to 5 PM     -[facility] Youth Mama Group: www.Funding Gates/free-new-mama-group  -Attend [employer] New "[employer]"a. Groups located at three locations:  [facility] and [address]. Sign up online.         Additional Resources:?   -American College of Nurse-Midwives (ACNM) http://www.midwife.org/; look at the informational handouts at http://www.midwife.org/Share-With-Women  www.mymidwife.org   ?   -Women's Health.gov:   http://www.womenshealth.gov/a-z-topics/index.html   ?   -Early Childhood and Family Education (ECFE):   ECFE offers parents hands-on learning experiences that will nourish a lifetime of teachable moments.   http://ecfe.info/ecfe-home/         -----------------------------------------------------------------------------------------------------------   (Before, during and after pregnancy)   MOOD DISORDER RESOURCES :  Are you feeling sad or depressed?   Do you feel more irritable or angry with those around you?   Are you having difficulty bonding with your baby?   Do you feel anxious or panicky?   Are you having problems with eating or sleeping?   Are you having upsetting thoughts that you can t get out of your mind?   Do you feel as if you are  out of control  or "  going crazy ?   Do you feel like you never should have become a mother?   Are you worried that you might hurt your baby or yourself?     Any of these symptoms, and many more, could indicate that you have a form of  mood or anxiety disorder, such as postpartum depression. While many women experience some mild mood changes during or after the birth of a child, 15 to 20% of women experience more significant symptoms of depression or anxiety. Please know that with informed care you can prevent a worsening of these symptoms and can fully recover. There is no reason to continue to suffer.  Women of every culture, age, income level and race can develop  mood and anxiety disorders. Symptoms can appear any time during pregnancy and the first 12 months after childbirth. There are effective and well-researched treatment options to help you recover. Although the term  postpartum depression  is most often used, there are actually several forms of illness that women may experience.     Resources:     -Pregnancy and Postpartum Support [place]: www.[facility].org  Who We Are: We are a group of mental health &  practitioners, service organizations, and mother volunteers who provides services to those struggling with a pregnancy, loss, or postpartum mood disorder through the Helpline, professional training, our resource list and website.  What We Do: We provide support, advocacy, awareness, and training about  mental health in [place].      Community Resource List:   This is a list of  resources within our community.   http://[facility]'supportmn.org/communityresourcelist    PSYCHOTHERAPISTS/CONSULTANTS   This is a list of licensed mental health professionals who have advanced clinical skills in the treatment of postpartum mood and anxiety disorders (PMADs).   Http://ppsupportmn.org/mentalhealthproviderresourcelist   INTEGRATIVE MEDICINE PRACTITIONERS:   This is a list of licensed  "providers who practice Integrative Medicine: a form of treatment that combines alternative medicine with evidence based medicine in an effort to treat the  whole person  (the person, not just the disease).   http://[facility].org/integrativemedicineproviders    PSYCHIATRIC CARE   This is a list of licensed Psychiatrists who have advanced clinical skills in the treatment and medication management for PMADs and lactating mothers.   Http://[facility].org/psychiatryproviderresroucelist   Click on the links below to find detailed profiles and contact information of providers who have been screened and approved as having advanced training in the area of PMADs. Please note: [facility] does not endorse a specific provider.   The list is in alphabetical order by city. You can also search for providers by city or [facility] code using the search box. Please click on the \"Show\" box to the upper right to advance to the next page. You may also call our Helpline at [phone] if you would like for our Helpline volunteer to find a provider for you.     -Postpartum Depression Support Group:   Weekly groups at no cost through [facility], , 1:30-3:00 p.m., at [facility], [address] [facility], [address]. [place], , 1:30-3:00 p.m., at [facility], [address]. To register for the group or get more information, call [phone].   -Postpartum Depression Support Group:   Outpatient Intensive Treatment program for women with  mood disorders [facility] Mother[facility]Baby Program     -[facility]  Resource Guide      Women s Mental Health at [facility]  This website provides a range of current information including discussion of new research findings in women s mental health and how such investigations inform day-to-day clinical practice. Despite the growing number of studies being conducted in " women s health, the clinical implications of such work are frequently controversial, leaving patients with questions regarding the most appropriate path to follow. Providing these resources to patients and their doctors so that individual clinical decisions can be made in a thoughtful and collaborative fashion dovetails with the mission of our Center.     https://[facility].org/        If you re having thoughts of harming yourself or your baby, it is vital to get support immediately. Call 911 or go to the nearest E.R.     TOLL-FREE / NATIONWIDE EMERGENCY ASSISTANCE   Immediate Emergency:  988  National Suicide Prevention Lifeline:   1-297.217.6126   Suicide Prevention Hotline:   2-420-KTGDNDR   National Postpartum Depression Hotline:   1-800-PPD-MOMS   Postpartum Support International (PSI)   PPD Helpline: (not a 24-hour hotline)   1-520.402.8740

## 2024-05-01 ENCOUNTER — OFFICE VISIT (OUTPATIENT)
Dept: MIDWIFE SERVICES | Facility: CLINIC | Age: 24
End: 2024-05-01
Payer: COMMERCIAL

## 2024-05-01 VITALS
HEART RATE: 76 BPM | BODY MASS INDEX: 38.25 KG/M2 | WEIGHT: 215.9 LBS | DIASTOLIC BLOOD PRESSURE: 70 MMHG | HEIGHT: 63 IN | SYSTOLIC BLOOD PRESSURE: 120 MMHG

## 2024-05-01 PROCEDURE — 99213 OFFICE O/P EST LOW 20 MIN: CPT | Performed by: MIDWIFE

## 2024-05-01 NOTE — PROGRESS NOTES
Indigo is a 27 yr old  at 3 weeks postpartum who presents to clinic today for BP check. Was seen last week for 2 week postpartum follow up (see chart notes). Since that visit has been feeling better except for not sleeping well. /70 today. Continues on Nifedipine daily and is checking BP Twice daily. Has been enrolled in the [facility] program so will follow up with them prn for BP readings. Plan to continue on meds until 6 week appt. Reviewed OTC meds such as Tylenol PM to try for relaxation and sleep aid. May want Rx for Vistaril if that does not work, will send message prn if Ex desired.

## 2024-05-22 ENCOUNTER — PRENATAL OFFICE VISIT (OUTPATIENT)
Dept: MIDWIFE SERVICES | Facility: CLINIC | Age: 24
End: 2024-05-22
Payer: COMMERCIAL

## 2024-05-22 VITALS
DIASTOLIC BLOOD PRESSURE: 78 MMHG | BODY MASS INDEX: 37.91 KG/M2 | OXYGEN SATURATION: 98 % | HEART RATE: 84 BPM | SYSTOLIC BLOOD PRESSURE: 116 MMHG | WEIGHT: 214 LBS

## 2024-05-22 DIAGNOSIS — Z37.9 VACUUM-ASSISTED VAGINAL DELIVERY: ICD-10-CM

## 2024-05-22 DIAGNOSIS — E66.01 MORBID OBESITY (H): ICD-10-CM

## 2024-05-22 PROBLEM — O09.93 SUPERVISION OF HIGH RISK PREGNANCY IN THIRD TRIMESTER: Status: RESOLVED | Noted: 2023-08-23 | Resolved: 2024-05-22

## 2024-05-22 PROBLEM — R51.9 HEADACHE: Status: RESOLVED | Noted: 2024-04-18 | Resolved: 2024-05-22

## 2024-05-22 PROBLEM — R03.0 ELEVATED BLOOD PRESSURE READING WITHOUT DIAGNOSIS OF HYPERTENSION: Status: RESOLVED | Noted: 2024-04-17 | Resolved: 2024-05-22

## 2024-05-22 PROBLEM — M54.50 ACUTE LEFT-SIDED LOW BACK PAIN WITHOUT SCIATICA: Status: RESOLVED | Noted: 2024-02-08 | Resolved: 2024-05-22

## 2024-05-22 PROBLEM — O14.10 PREECLAMPSIA, SEVERE: Status: RESOLVED | Noted: 2024-04-20 | Resolved: 2024-05-22

## 2024-05-22 PROBLEM — R42 DIZZINESS: Status: RESOLVED | Noted: 2024-04-18 | Resolved: 2024-05-22

## 2024-05-22 PROBLEM — H53.9 VISION CHANGES: Status: RESOLVED | Noted: 2024-04-18 | Resolved: 2024-05-22

## 2024-05-22 PROCEDURE — 99207 PR POST PARTUM EXAM: CPT | Performed by: ADVANCED PRACTICE MIDWIFE

## 2024-05-22 ASSESSMENT — EDINBURGH POSTNATAL DEPRESSION SCALE (EPDS)
THINGS HAVE BEEN GETTING ON TOP OF ME: NO, MOST OF THE TIME I HAVE COPED QUITE WELL
I HAVE LOOKED FORWARD WITH ENJOYMENT TO THINGS: AS MUCH AS I EVER DID
I HAVE BLAMED MYSELF UNNECESSARILY WHEN THINGS WENT WRONG: YES, SOME OF THE TIME
TOTAL SCORE: 8
THE THOUGHT OF HARMING MYSELF HAS OCCURRED TO ME: NEVER
I HAVE BEEN ANXIOUS OR WORRIED FOR NO GOOD REASON: YES, SOMETIMES
I HAVE FELT SAD OR MISERABLE: NOT VERY OFTEN
I HAVE BEEN SO UNHAPPY THAT I HAVE BEEN CRYING: ONLY OCCASIONALLY
I HAVE BEEN SO UNHAPPY THAT I HAVE HAD DIFFICULTY SLEEPING: NOT AT ALL
I HAVE FELT SCARED OR PANICKY FOR NO GOOD REASON: NO, NOT MUCH
I HAVE BEEN ABLE TO LAUGH AND SEE THE FUNNY SIDE OF THINGS: AS MUCH AS I ALWAYS COULD

## 2024-05-22 NOTE — PATIENT INSTRUCTIONS
"\"We hope you had a positive experience and that you can definitely recommend [facility] to your family and friends. You ll be receiving a survey soon, and we look forward to hearing your feedback\".    POSTPARTUM INFORMATION AND RESOURCES:     Congratulations on the birth of your baby. We have gathered together some information on staying healthy in the postpartum time including information on exercise, nutrition and mental health. We have also listed some local and national resources for lactation support and mental health support.     If you have questions or concerns and need to speak with a midwife immediately, please call the on-call midwife at [phone].     If you have a non-emergent question or would like to schedule a follow up appointment, please call the clinic midwife at [phone].     Thank you for sharing your birth experience with the [facility].  Congratulations on the birth of your baby!     ---------------------------------------------------------------------------------------------------------  EXERCISE & NUTRITION:      Getting and Staying Active: A Healthy You!   -Why should I exercise? Exercise, being physically active, is very important for all women. Being active can help you:   Lose or maintain weight   Have more energy   Sleep better   Enjoy sex more   Relieve stress and think better   Lower the chance you will have heart disease, high blood pressure, and diabetes   Strengthen your bones and muscles   Have fewer hot flashes if you are older   -How active do I have to be to get the bene?ts of exercise?  Studies show that as little as 15 minutes of moderate exercise--like fast walking or dancing--3 times a week can improve the health of your heart. Ifyou want to get the best benefits from exercise, try to increase your physical activity to at least 30 minutes, 5 times a week. If you have a serious health problem,be sure to talk " with your health care provider before starting an exercise program.   Https://Dibbz/  Http://www.FeZo.com/     @[email or web address]  @[email or web address]  [email or web address].[email or web address]     Taking Care of your Health: Health Care Maintenance Screening recommendations   In all the things women do, taking care of everything and everybody else, they sometimes forget to take care of themselves. This handout reviews the health screenings and vaccines that are recommended for women of all ages. Talk with yourhealth care provider about which tests and vaccines you need. The chart below lists the screening tests and vaccinations recommended for healthy women who do not have a high risk for most diseases.   The recommendations in thischart are guidelines only. For some tests and vaccines, the chart says you should talk to your health care provider.This is because the best recommendations for you depend on your personal health history. Your health care provider may suggest more frequent testing or additional tests ifyou havea higher chance of getting some diseases      Planning Your Family: Developing a Reproductive Life Plan   Planning ahead can help you avoid getting pregnant when you don t want to be pregnant and also be in good health if and when you do decide to become pregnant. Many women have at least one pregnancy in their lives, even if it was not planned. In fact, about half of all pregnancies are not planned. Getting pregnant when you did not plan it can be a problem, or it can turn into a happy event. Planning pregnancy leads to healthier pregnancies, healthier mothers, and healthier families.   Although many women want to have a family, not everyone wants to have children. More and more women are childless by choice (also known as childfree). Whether to have children is a personal choice that only you can make. It s okay not to want children! If you never want to get pregnant, it is important to make sure you  "always use very effective birth control, such as an intrauterine device, the birth control implant, female sterilization (having your tubes tied), or your partner having a vasectomy.      Reliable resources:   ?   American College of Nurse-Midwives (ACNM) http://www.midwife.org/; look at the informational handouts at http://www.midwife.org/Share-With-Women   ??   Women's Health.gov:   http://www.womenshealth.gov/a-z-topics/index.html   FDA - Nutrition ?www.mypyramid.gov? Under \"For Consumers,\" click on \"pregnant and breastfeeding women.\"   ?   Vaccines : Centers for Disease Control and Prevention (CDC) http://www.cdc.gov/vaccines/   ?   [facility] www.Code42.[employer]   ?   When researching information on the web, question the validity of websites.? The domains .gov, Power [place] and.org tend to be more reliable information.? If there are a lot of advertisements, be cautious of the information provided. Stay away from blogs and chat rooms please!   ?   ?   Nutrition and supplements:   Daily multivitamin vitamin (with 400 - 1000 mcg folic acid).? Take with food as needed.   ?   4-5 servings of dairy or other calcium rich foods (fish, leafy greens, soy)?per day - if not, take 500-1000 mg additional calcium (Tums, pills, chews). Take with dairy.   ?   Vitamin D3 4000 IU geltab daily. Vitamin D rich foods: Cod Liver Oil (1Tbsp), Welch, Mackerel, Tuna, fortified milk and yogurt, Beef and liver, sardines, egg, fortified cereals, swiss cheese.? Take supplements with fattiest meal.?   ?   2-3 (4) oz servings of fish, seafood, nuts (walnuts & almonds), oils, avocado per week - if not, take Omega 3 Fatty acids: DHA & IRIS 7836-3065 mg per day. ?Other names: cod liver oil, fish oil. Take with fattiest meal.   ?   ?---------------------------------------------------------------------------------------------------------  POSTPARTUM & LACTATION RESOURCES :         Early Childhood Family Education [place] [address] provides a free, drop-in " "class/breastfeeding support group, facilitated by a lactation consultant and .  During the group you can connect with other parents, weigh your baby, ask questions about feeding and baby development, and more.  You do not need to register.    in-person meetings will begin on , are for parents of babies from birth to 9 months, and will meet on Monday evenings from 6 - 7:15 pm in [facility], which is at [address] [facility].  [place] [address] also offers virtual group meetings with a lactation consultant/.  These take place on , from 11:30 am - 12:30 pm for parents of newborns to 3-month-olds, and from 4:15 - 5:15 for parents of 3 - 9 - month olds.  To get the meeting link contact Maria G Flores at [phone].    [facility] offers a free, drop-in breastfeeding support group facilitated by DANTE Randolph.   at [facility] [address], [place].  A scale is available to check baby weights, if desired.  [facility] also has a variety of new parent classes:  (cost for registration)  https://Hittite Microwave/classes/    [facility] Lactation [facility] facilitated by DANTE Stokes:  Free, drop-in support group for breastfeeding, with baby scale available if needed.  Meets at [facility], second Tuesday of each month, 10 am - 12 pm.  Text [phone] for info.    Lat Cafe Support Group,  at 10:30 am   Run by DANTE Ray of [facility]   Go to [facility] Facebook page, click on \"events\" for link:   Https://www.facebook.com/events/668345373262127/    [facility] breastfeeding support community:  free, drop-in breastfeeding support facilitated by Certified Lactation Counselors, open  and  from 1 pm - 5 pm.  In [place]:  [facility], [address] [facility]:   guidingarThe MetroHealth Systembecka.[facility]    Health " [facility] Milk Hour, Thursdays at 2:30 pm    Run by DANTE Wills  Go to [facility] FB page and send message to get link   Https://www.Ideagen.Project Green/healthPowerWise Holdingsundations/    Pal Gonzalez:  http://www.Intercept Pharmaceuticalsas.org/    [facility] offers a Lactation Lounge every Friday 12pm - 1pm, run by Pal Ferreira Leader.  Sign up via link at Centripetal Software/cbe-lactation   https://www.Centripetal Software/cbe-lactation    [facility] is offering virtual support groups every Monday, 10:30 am - 12 pm, run by RN IBCLC: Https://www.Ideagen.com/events/835779556432348/    Culturally-Specific Breastfeeding Support:     For Hmong Families:   The Hmong Breastfeeding Coalition is a wonderful support for [place] Hmong women who are breastfeeding.  They are best found on Facebook.    for Black families:    Chocolate Milk Club:  http://www.OUYA/chocolate-milk-club/  Email: Enrique@[employer] Stream [employer]    R.O.S.E. = Reaching our Sisters Everywhere  Http://www.breastfeedingrose.org/    Club Mom breastfeeding support for Black mothers:  Contact Aime Gonzalez  Phone: [phone]   Email:  [email or web address].     Teresa Gamboa  Phone: [phone]   Email:  [email or web address]@[email or web address].    Club Dad parent support for Black fathers:   Contact Braeden Osborne   Phone: [phone]   Email:  [email or web address].    For Native/Indigenous Families:    https://www.Ideagen.com/groups/issac.gimashkikinaan        OUTPATIENT LACTATION RESOURCES   -Schedule an appointment with a [facility] midwife who is also a Lactation Consultant by calling [phone].  Visits on Tuesdays, Wednesdays and Thursdays at multiple locations.  Call: [phone].       - Information on medication use while breastfeeding: http://toxnet.nlm.nih.gov/newtoxnet/lactmed.htm      Other Online Breastfeeding Resources:   BreastfeedingmadesBarnacleleGeoCitiescom  "  Tereseli.org (La Leche League)   Normalfed.com   Womenshealth.gov/breastfeeding   Workandpump.com   TekLinks.The Poker Barrel  https://Yummy Food/abcs/   Click on \"Learn More About Attachment\"     -New Parent Connection Drop-In:  In collaboration with [place], Early Childhood Family Education (ECFE), a program of [address], offers ongoing classes for new parents in their infants.  The connection classes offer support and resources to parents during the exciting and challenging period of transition following the birth of her baby.  Parents and babies (birth to 6 months of age) may join the group at any time.  Baby's birth to 3 months meet , from 1230 to 1:30 PM  Babies 3-6 months meet , from 4 to 5 PM     -[employer] Mama Group: www.Legend Power Systems/free-new-mama-group  -Attend [employer]a. Groups located at three locations:  [facility] and [place]. Sign up online.         Additional Resources:?   -American College of Nurse-Midwives (ACNM) http://www.midwife.org/; look at the informational handouts at http://www.midwife.org/Share-With-Women  www.mymidwife.org   ?   -Women's Health.gov:   http://www.womenshealth.gov/a-z-topics/index.html   ?   -Early Childhood and Family Education (ECFE):   ECFE offers parents hands-on learning experiences that will nourish a lifetime of teachable moments.   http://ecfe.info/ecfe-home/         -----------------------------------------------------------------------------------------------------------   (Before, during and after pregnancy)   MOOD DISORDER RESOURCES :  Are you feeling sad or depressed?   Do you feel more irritable or angry with those around you?   Are you having difficulty bonding with your baby?   Do you feel anxious or panicky?   Are you having problems with eating or sleeping?   Are you having upsetting thoughts that you can t get out of your mind?   Do you feel as if you are  out of control  or "  going crazy ?   Do you feel like you never should have become a mother?   Are you worried that you might hurt your baby or yourself?     Any of these symptoms, and many more, could indicate that you have a form of  mood or anxiety disorder, such as postpartum depression. While many women experience some mild mood changes during or after the birth of a child, 15 to 20% of women experience more significant symptoms of depression or anxiety. Please know that with informed care you can prevent a worsening of these symptoms and can fully recover. There is no reason to continue to suffer.  Women of every culture, age, income level and race can develop  mood and anxiety disorders. Symptoms can appear any time during pregnancy and the first 12 months after childbirth. There are effective and well-researched treatment options to help you recover. Although the term  postpartum depression  is most often used, there are actually several forms of illness that women may experience.     Resources:     -Pregnancy and Postpartum Support [place]: www.[facility].org  Who We Are: We are a group of mental health &  practitioners, service organizations, and mother volunteers who provides services to those struggling with a pregnancy, loss, or postpartum mood disorder through the Helpline, professional training, our resource list and website.  What We Do: We provide support, advocacy, awareness, and training about  mental health in [place].      Community Resource List:   This is a list of  resources within our community.   http://[facility] Truman Medical Centerupportmn.org/communityresourcelist    PSYCHOTHERAPISTS/CONSULTANTS   This is a list of licensed mental health professionals who have advanced clinical skills in the treatment of postpartum mood and anxiety disorders (PMADs).   Http://ppsupportmn.org/mentalhealthproviderresourcelist   INTEGRATIVE MEDICINE PRACTITIONERS:   This is a list of licensed  "providers who practice Integrative Medicine: a form of treatment that combines alternative medicine with evidence based medicine in an effort to treat the  whole person  (the person, not just the disease).   http://[facility].org/integrativemedicineproviders    PSYCHIATRIC CARE   This is a list of licensed Psychiatrists who have advanced clinical skills in the treatment and medication management for PMADs and lactating mothers.   Http://[facility].org/psychiatryproviderresroucelist   Click on the links below to find detailed profiles and contact information of providers who have been screened and approved as having advanced training in the area of PMADs. Please note: [facility] does not endorse a specific provider.   The list is in alphabetical order by city. You can also search for providers by city or [facility] code using the search box. Please click on the \"Show\" box to the upper right to advance to the next page. You may also call our Helpline at [phone] if you would like for our Helpline volunteer to find a provider for you.     -Postpartum Depression Support Group:   Weekly groups at no cost through [facility], , 1:30-3:00 p.m., at [facility], [address] [facility], [address]. [place], , 1:30-3:00 p.m., at [facility], [address]. To register for the group or get more information, call [phone].   -Postpartum Depression Support Group:   Outpatient Intensive Treatment program for women with  mood disorders [facility]     -[facility]  Resource Guide      Women s Mental Health at [facility]  This website provides a range of current information including discussion of new research findings in women s mental health and how such investigations inform day-to-day clinical practice. Despite the growing number of studies being conducted in " women s health, the clinical implications of such work are frequently controversial, leaving patients with questions regarding the most appropriate path to follow. Providing these resources to patients and their doctors so that individual clinical decisions can be made in a thoughtful and collaborative fashion dovetails with the mission of our Center.     https://[facility]Mc[facility].org/        If you re having thoughts of harming yourself or your baby, it is vital to get support immediately. Call 911 or go to the nearest E.R.     TOLL-FREE / NATIONWIDE EMERGENCY ASSISTANCE   Immediate Emergency:  988  National Suicide Prevention Lifeline:   1-122.980.8673   Suicide Prevention Hotline:   1-018-VLUMGTH   National Postpartum Depression Hotline:   1-800-PPD-MOMS   Postpartum Support International (PSI)   PPD Helpline: (not a 24-hour hotline)   1-184.390.3338

## 2024-05-22 NOTE — PROGRESS NOTES
SUBJECTIVE:  Indigo is here for a 6-week postpartum checkup.  She had a vaginal delivery with vacuum assist of a viable girl, weight 7 pounds 1 oz.. Tran had severe PP preeclampsia and readmission to [facility] (see 2-week PP visit note on 4/24/24) and she had been enrolled in the [facility] program since. She was taking Nifedipine. She noticed her BP trending down a couple of weeks ago and stopped her BP meds about 1 week ago. She has had excellent readings per chart review of her sent in BP readings through [facility] program.  BP yesterday per chart review: 113/77, 111/77, 122/75. BP today is normotensive as well and she is feeling well and happy about this.  She had a left sulcus laceration that was repaired.  Since delivery, she has been breast feeding mostly, with some pumping 1-2 times per day. Reports feeding is going well and baby has a good latch.  Feels well supported at home.  She has no signs of infection.  Bleeding stopped at 3 weeks. She did notice a very small amount of spotting on one occasion a few days ago with a small grape sized clot but nothing since. Denies pain, cramping, or other concerns.  Bowel and bladder function is mostly normal. She does utilize senna tea every few days which helps with constipation.  Resumed intercourse: not yet.  We discussed contraception and she has chosen condoms/NFP.        OBJECTIVE:  /78   Pulse 84   Wt 97.1 kg (214 lb)   LMP 06/12/2023 (Exact Date)   SpO2 98%   Breastfeeding Yes   BMI 37.91 kg/m        EPDS:    5/22/2024  2:30 PM CDT - Filed by Patient    I have been able to laugh and see the funny side of things. As much as I always could   I have looked forward with enjoyment to things. As much as I ever did   I have blamed myself unnecessarily when things went wrong. Yes, some of the time   I have been anxious or worried for no good reason. Yes, sometimes   I have felt scared or panicky for no good reason. No, not much   Things have been getting on top of  me. No, most of the time I have coped quite well   I have been so unhappy that I have had difficulty sleeping. Not at all   I have felt sad or miserable. Not very often   I have been so unhappy that I have been crying. Only occasionally   The thought of harming myself has occurred to me. Never       EXAM:    HEENT: grossly normal.  NECK: no lymphadenopathy or thyroidomegaly.  LUNGS: CTA X 2, no rales or crackles.  BACK: No spinal or CVA tenderness.  HEART: RRR without murmurs clicks or gallops.  ABDOMEN: soft, non tender, good bowel sounds, without masses rebound, guarding or tenderness. Diastasis: 1.5 FB    PELVIC:    External genitalia: normal without lesion, repair well healed.                            Vagina: normal mucosa and rugae, no discharge.    EXTREMITIES: Warm to touch, no ankle edema or calf tenderness.  NEUROLOGIC: grossly normal.      ASSESSMENT:   Normal 6-week postpartum exam after vaginal delivery with vacuum assist.  S/p readmission to [facility] for severe postpartum preeclampsia, followed by [facility] BP program.  Normotensive, no meds at this time.  Lactating mother  No evidence of  mood disorder  Due for pap in September - declines for today.      PLAN:  Plans condoms for contraception.   Reviewed initiation of exercise and intercourse.  Postpartum resources discussed and attached to AVS.  Offered PT referral if she desires for core and pelvic health - she will let us know if she needs this.  Follow up in 1 year for annual exam or PRN.      ATUL Arora, WALLACE

## 2024-06-04 ENCOUNTER — DOCUMENTATION ONLY (OUTPATIENT)
Dept: MATERNAL FETAL MEDICINE | Facility: CLINIC | Age: 24
End: 2024-06-04
Payer: COMMERCIAL

## 2024-12-15 ENCOUNTER — HEALTH MAINTENANCE LETTER (OUTPATIENT)
Age: 24
End: 2024-12-15

## 2025-02-18 NOTE — LACTATION NOTE
Lactation Follow Up Note    Reason for visit/ call/ message:  Assistance with pumping / engorgement    Supply:  5-6 ounces / pumping session per Indigo  Pumping every 3 hours    Significant changes (medications, equipment, comfort, etc):  Engorged - full between pumping sessions    Skin to skin/ nuzzling/ latching:  Azucena infant is at home with her Father, they may come later today for a feeding    Education given:  Discussed management of engorgement, ice after pumping for 15 minutes.  Discussed capping volume as long as breasts feel softened and areas of congestion have softened and is tolerable    Plan:  Capping volume slightly below the current 5-6 ounces down to 4-5 ounces  Pumped together this afternoon and Major was able to easily express a total of 4 ounces in about 10 minutes and her flow had significantly slowed and breasts softened. Breasts palpated after pumping and were softened without any congested  / lumpy areas. Indigo felt comfortable capping at the 60 mL from each breast. Milk was labeled, dated and timed and placed in pink bin in the fridge in the nursery along with previous pumping sessions.  Azucena might come tonight for a feeding and Indigo will call for assistance as needed, she is using a nipple shield.      Tavia Preston RN, IBCLC   Lactation Consultant  [place]: Lactation Specialist Group [phone]  Office: [phone]       Kaleigh Tha

## 2025-07-21 ENCOUNTER — OFFICE VISIT (OUTPATIENT)
Dept: FAMILY MEDICINE | Facility: CLINIC | Age: 25
End: 2025-07-21
Payer: COMMERCIAL

## 2025-07-21 VITALS
DIASTOLIC BLOOD PRESSURE: 84 MMHG | BODY MASS INDEX: 33.92 KG/M2 | HEART RATE: 98 BPM | RESPIRATION RATE: 16 BRPM | TEMPERATURE: 98.7 F | SYSTOLIC BLOOD PRESSURE: 138 MMHG | OXYGEN SATURATION: 98 % | WEIGHT: 191.5 LBS

## 2025-07-21 DIAGNOSIS — B08.4 HAND, FOOT AND MOUTH DISEASE: Primary | ICD-10-CM

## 2025-07-21 DIAGNOSIS — J02.9 SORE THROAT: ICD-10-CM

## 2025-07-21 PROCEDURE — 3079F DIAST BP 80-89 MM HG: CPT | Performed by: NURSE PRACTITIONER

## 2025-07-21 PROCEDURE — 99213 OFFICE O/P EST LOW 20 MIN: CPT | Performed by: NURSE PRACTITIONER

## 2025-07-21 PROCEDURE — 3075F SYST BP GE 130 - 139MM HG: CPT | Performed by: NURSE PRACTITIONER

## 2025-07-21 RX ORDER — IBUPROFEN 200 MG
200 TABLET ORAL EVERY 4 HOURS PRN
COMMUNITY

## 2025-07-21 ASSESSMENT — ENCOUNTER SYMPTOMS: SORE THROAT: 1

## 2025-07-21 NOTE — PROGRESS NOTES
"  Assessment & Plan     Hand, foot and mouth disease  Discussed likely ongoing symptoms related from hand foot mouth. Discussed can take 1-3 weeks for full resolution of symptoms. Throat is clear on exam today-tonsils are normal appearing-no sores on swelling.     Sore throat  Discussed if throat pain is persisting recommend evaluation by ENT for possible laryngoscope. Patient is describing pain further down than I am able to evaluate on exam here in clinic and if persisting I think needs to be reassessed.    - Adult ENT  Referral; Future    BMI  Estimated body mass index is 33.92 kg/m  as calculated from the following:    Height as of 7/11/25: 1.6 m (5' 3\").    Weight as of this encounter: 86.9 kg (191 lb 8 oz).           Moises Sood is a 25 year old, presenting for the following health issues:  Pharyngitis (Hand foot and mouth few weeks ago - sore throat started from then, but has not gone away. Wakes up every morning with a burning sensation. When swallowing experiencing discomfort/ pain going through ear and head on right side. )      7/21/2025     3:18 PM   Additional Questions   Roomed by KADEN Alexander   Accompanied by david     Pharyngitis     History of Present Illness       Reason for visit:  Throat pain    She eats 2-3 servings of fruits and vegetables daily.She consumes 1 sweetened beverage(s) daily.She exercises with enough effort to increase her heart rate 9 or less minutes per day.  She exercises with enough effort to increase her heart rate 3 or less days per week.   She is taking medications regularly.        Patient reports waking up every morning with severe sore throat. Negative strep testing at urgent care on 7/11. Patient was diagnosed with hand foot mouth and given dexamethasone 10 mg in clinic for pain and swelling--not significantly helpful. Patient was diagnosed about 2 full weeks ago for sore throat. Patient has tried ibuprofen-this was helpful-burning pain going from " throat to ear.   Patient reports history of issues with tonsils in the past, NO symptoms other than throat pain. No sinus pain or pressure.                   Objective    /84   Pulse 98   Temp 98.7  F (37.1  C) (Oral)   Resp 16   Wt 86.9 kg (191 lb 8 oz)   LMP 07/17/2025 (Exact Date)   SpO2 98%   Breastfeeding No   BMI 33.92 kg/m    Body mass index is 33.92 kg/m .  Physical Exam  Constitutional:       Appearance: Normal appearance.   HENT:      Right Ear: Tympanic membrane normal.      Left Ear: Tympanic membrane normal.      Nose: Nose normal.      Right Sinus: No maxillary sinus tenderness or frontal sinus tenderness.      Left Sinus: No maxillary sinus tenderness or frontal sinus tenderness.      Mouth/Throat:      Mouth: Mucous membranes are moist.      Pharynx: Posterior oropharyngeal erythema present.      Tonsils: No tonsillar exudate. 1+ on the right. 1+ on the left.   Cardiovascular:      Rate and Rhythm: Normal rate and regular rhythm.      Heart sounds: Normal heart sounds.   Pulmonary:      Effort: Pulmonary effort is normal.      Breath sounds: Normal breath sounds.   Lymphadenopathy:      Cervical: No cervical adenopathy.   Neurological:      General: No focal deficit present.      Mental Status: She is alert and oriented to person, place, and time.   Psychiatric:         Mood and Affect: Mood normal.         Behavior: Behavior normal.                    Signed Electronically by: ATUL Smith CNP

## 2025-07-22 ENCOUNTER — PATIENT OUTREACH (OUTPATIENT)
Dept: CARE COORDINATION | Facility: CLINIC | Age: 25
End: 2025-07-22
Payer: COMMERCIAL

## 2025-07-24 ENCOUNTER — PATIENT OUTREACH (OUTPATIENT)
Dept: CARE COORDINATION | Facility: CLINIC | Age: 25
End: 2025-07-24
Payer: COMMERCIAL